# Patient Record
Sex: FEMALE | Race: WHITE | NOT HISPANIC OR LATINO | Employment: OTHER | URBAN - METROPOLITAN AREA
[De-identification: names, ages, dates, MRNs, and addresses within clinical notes are randomized per-mention and may not be internally consistent; named-entity substitution may affect disease eponyms.]

---

## 2020-08-18 ENCOUNTER — OFFICE VISIT (OUTPATIENT)
Dept: DERMATOLOGY | Facility: CLINIC | Age: 65
End: 2020-08-18
Payer: COMMERCIAL

## 2020-08-18 DIAGNOSIS — L82.1 SK (SEBORRHEIC KERATOSIS): ICD-10-CM

## 2020-08-18 DIAGNOSIS — L72.0 MILIA: ICD-10-CM

## 2020-08-18 DIAGNOSIS — L57.0 AK (ACTINIC KERATOSIS): ICD-10-CM

## 2020-08-18 DIAGNOSIS — Z12.83 SCREENING EXAM FOR SKIN CANCER: ICD-10-CM

## 2020-08-18 DIAGNOSIS — L81.4 LENTIGO: ICD-10-CM

## 2020-08-18 DIAGNOSIS — L71.9 ROSACEA: ICD-10-CM

## 2020-08-18 DIAGNOSIS — L72.0 EIC (EPIDERMAL INCLUSION CYST): ICD-10-CM

## 2020-08-18 DIAGNOSIS — D48.5 NEOPLASM OF UNCERTAIN BEHAVIOR OF SKIN: Primary | ICD-10-CM

## 2020-08-18 PROCEDURE — 99203 OFFICE O/P NEW LOW 30 MIN: CPT | Mod: 25,S$GLB,, | Performed by: DERMATOLOGY

## 2020-08-18 PROCEDURE — 99999 PR PBB SHADOW E&M-NEW PATIENT-LVL II: ICD-10-PCS | Mod: PBBFAC,,, | Performed by: DERMATOLOGY

## 2020-08-18 PROCEDURE — 99203 PR OFFICE/OUTPT VISIT, NEW, LEVL III, 30-44 MIN: ICD-10-PCS | Mod: 25,S$GLB,, | Performed by: DERMATOLOGY

## 2020-08-18 PROCEDURE — 11102 PR TANGENTIAL BIOPSY, SKIN, SINGLE LESION: ICD-10-PCS | Mod: S$GLB,,, | Performed by: DERMATOLOGY

## 2020-08-18 PROCEDURE — 88305 TISSUE EXAM BY PATHOLOGIST: ICD-10-PCS | Mod: 26,,, | Performed by: PATHOLOGY

## 2020-08-18 PROCEDURE — 88305 TISSUE EXAM BY PATHOLOGIST: CPT | Performed by: PATHOLOGY

## 2020-08-18 PROCEDURE — 88305 TISSUE EXAM BY PATHOLOGIST: CPT | Mod: 26,,, | Performed by: PATHOLOGY

## 2020-08-18 PROCEDURE — 17000 PR DESTRUCTION(LASER SURGERY,CRYOSURGERY,CHEMOSURGERY),PREMALIGNANT LESIONS,FIRST LESION: ICD-10-PCS | Mod: 59,S$GLB,, | Performed by: DERMATOLOGY

## 2020-08-18 PROCEDURE — 11102 TANGNTL BX SKIN SINGLE LES: CPT | Mod: S$GLB,,, | Performed by: DERMATOLOGY

## 2020-08-18 PROCEDURE — 99999 PR PBB SHADOW E&M-NEW PATIENT-LVL II: CPT | Mod: PBBFAC,,, | Performed by: DERMATOLOGY

## 2020-08-18 PROCEDURE — 17000 DESTRUCT PREMALG LESION: CPT | Mod: 59,S$GLB,, | Performed by: DERMATOLOGY

## 2020-08-18 NOTE — PATIENT INSTRUCTIONS
" Shave Biopsy Wound Care    Your doctor has performed a shave biopsy today.  A band aid and vaseline ointment has been placed over the site.  This should remain in place for 24 hours.  It is recommended that you keep the area dry for the first 24 hours.  After 24 hours, you may remove the band aid and wash the area with warm soap and water and apply Vaseline jelly.  Many patients prefer to use Neosporin or Bacitracin ointment.  This is acceptable; however, know that you can develop an allergy to this medication even if you have used it safely for years.  It is important to keep the area moist.  Letting it dry out and get air slows healing time, and will worsen the scar.  Band aid is optional after first 24 hours.      If you notice increasing redness, tenderness, pain, or yellow drainage at the biopsy site, please notify your doctor.  These are signs of an infection.    If your biopsy site is bleeding, apply firm pressure for 15 minutes straight.  Repeat for another 15 minutes, if it is still bleeding.   If the surgical site continues to bleed, then please contact your doctor.      For [facility] users:   You will receive a [facility] notification after the pathologist has finished reviewing your biopsy specimen. Pathology results, however, will not be released online so you will see a "no content" message. Once your dermatologist reviews and clinically correlates your biopsy results, you will either receive a letter in the mail with the results of a phone call from your doctor's office if further explanation or treatment is warranted.       [address] [place] [address]/ [phone] FAX/ [email or web address]      CRYOSURGERY      Your doctor has used a method called cryosurgery to treat your skin condition. Cryosurgery refers to the use of very cold substances to treat a variety of skin conditions such as warts, pre-skin cancers, molluscum contagiosum, sun spots, and several benign " growths. The substance we use in cryosurgery is liquid nitrogen and is so cold (-195 degrees Celsius) that is burns when administered.     Following treatment in the office, the skin may immediately burn and become red. You may find the area around the lesion is affected as well. It is sometimes necessary to treat not only the lesion, but a small area of the surrounding normal skin to achieve a good response.     A blister, and even a blood filled blister, may form after treatment.   This is a normal response. If the blister is painful, it is acceptable to sterilize a needle and with rubbing alcohol and gently pop the blister. It is important that you gently wash the area with soap and warm water as the blister fluid may contain wart virus if a wart was treated. Do no remove the roof of the blister.     The area treated can take anywhere from 1-3 weeks to heal. Healing time depends on the kind skin lesion treated, the location, and how aggressively the lesion was treated. It is recommended that the areas treated are covered with Vaseline or bacitracin ointment and a band-aid. If a band-aid is not practical, just ointment applied several times per day will do. Keeping these areas moist will speed the healing time.    Treatment with liquid nitrogen can leave a scar. In dark skin, it may be a light or dark scar, in light skin it may be a white or pink scar. These will generally fade with time, but may never go away completely.     If you have any concerns after your treatment, please feel free to call the office.       [facility] [place] [address]/ [phone] FAX/ [email or web address]

## 2020-08-18 NOTE — PROGRESS NOTES
Subjective:       Patient ID:  Ginger Marshall is a 65 y.o. female who presents for   Chief Complaint   Patient presents with    Skin Check     tbse     Here for TBSE    Patient with new complaint of lesion(s)  Location: right forearm  Duration: 3 years  Symptoms: none  Relieving factors/Previous treatments: none    No h/o nmsc or mm        Review of Systems   Skin: Positive for daily sunscreen use (most) and activity-related sunscreen use. Negative for recent sunburn.   Hematologic/Lymphatic: Does not bruise/bleed easily.        Objective:    Physical Exam   Constitutional: She appears well-developed and well-nourished. No distress.   Neurological: She is alert and oriented to person, place, and time. She is not disoriented.   Psychiatric: She has a normal mood and affect.   Skin:   Areas Examined (abnormalities noted in diagram):   Scalp / Hair Palpated and Inspected  Head / Face Inspection Performed  Neck Inspection Performed  Chest / Axilla Inspection Performed  Abdomen Inspection Performed  Genitals / Buttocks / Groin Inspection Performed  Back Inspection Performed  RUE Inspected  LUE Inspection Performed  RLE Inspected  LLE Inspection Performed  Nails and Digits Inspection Performed                   Diagram Legend     Erythematous scaling macule/papule c/w actinic keratosis       Vascular papule c/w angioma      Pigmented verrucoid papule/plaque c/w seborrheic keratosis      Yellow umbilicated papule c/w sebaceous hyperplasia      Irregularly shaped tan macule c/w lentigo     1-2 mm smooth white papules consistent with Milia      Movable subcutaneous cyst with punctum c/w epidermal inclusion cyst      Subcutaneous movable cyst c/w pilar cyst      Firm pink to brown papule c/w dermatofibroma      Pedunculated fleshy papule(s) c/w skin tag(s)      Evenly pigmented macule c/w junctional nevus     Mildly variegated pigmented, slightly irregular-bordered macule c/w mildly atypical nevus      Flesh colored to evenly  pigmented papule c/w intradermal nevus       Pink pearly papule/plaque c/w basal cell carcinoma      Erythematous hyperkeratotic cursted plaque c/w SCC      Surgical scar with no sign of skin cancer recurrence      Open and closed comedones      Inflammatory papules and pustules      Verrucoid papule consistent consistent with wart     Erythematous eczematous patches and plaques     Dystrophic onycholytic nail with subungual debris c/w onychomycosis     Umbilicated papule    Erythematous-base heme-crusted tan verrucoid plaque consistent with inflamed seborrheic keratosis     Erythematous Silvery Scaling Plaque c/w Psoriasis     See annotation          Assessment / Plan:      Pathology Orders:     Normal Orders This Visit    Specimen to Pathology, Dermatology     Questions:    Procedure Type: Dermatology and skin neoplasms    Number of Specimens: 1    ------------------------: -------------------------    Spec 1 Procedure: Biopsy    Spec 1 Clinical Impression: r/o sclerotic bcc vs scar    Spec 1 Source: mid forehead        Neoplasm of uncertain behavior of skin  -     Specimen to Pathology, Dermatology  Shave biopsy procedure note:    Shave biopsy performed after verbal consent including risk of infection, scar, recurrence, need for additional treatment of site. Area prepped with alcohol, anesthetized with approximately 1.0cc of 1% lidocaine with epinephrine. Lesional tissue shaved with razor blade. Hemostasis achieved with application of aluminum chloride followed by hyfrecation. No complications. Dressing applied. Wound care explained.    If biopsy positive for malignancy, refer to Dr. Millard for Mohs surgery consultation.      AK (actinic keratosis)  Cryosurgery Procedure Note    Verbal consent from the patient is obtained including, but not limited to, risk of hypopigmentation/hyperpigmentation, scar, recurrence of lesion. The patient is aware of the precancerous quality and need for treatment of these lesions.  Liquid nitrogen cryosurgery is applied to the 1 actinic keratoses, as detailed in the physical exam, to produce a freeze injury. The patient is aware that blisters may form and is instructed on wound care with gentle cleansing and use of vaseline ointment to keep moist until healed. The patient is supplied a handout on cryosurgery and is instructed to call if lesions do not completely resolve.      SK (seborrheic keratosis)  These are benign inherited growths without a malignant potential. Reassurance given to patient. No treatment is necessary.       Lentigo  This is a benign hyperpigmented sun induced lesion. Daily sun protection will reduce the number of new lesions. Treatment of these benign lesions are considered cosmetic.      Rosacea  sent Rx for triple cream [facility] - use on face qday      Screening exam for skin cancer  Total body skin examination performed today including at least 12 points as noted in physical examination. Suspicious lesions noted.      Milia - upper lip  Reassurance given to patient. No treatment is necessary.   Treatment of benign, asymptomatic lesions may be considered cosmetic.      EIC (epidermal inclusion cyst)  Reassurance given to patient. No treatment is necessary.   Treatment of benign, asymptomatic lesions may be considered cosmetic.               Follow up in about 6 months (around 2/18/2021).

## 2020-08-20 ENCOUNTER — TELEPHONE (OUTPATIENT)
Dept: DERMATOLOGY | Facility: CLINIC | Age: 65
End: 2020-08-20

## 2020-08-20 LAB
FINAL PATHOLOGIC DIAGNOSIS: NORMAL
GROSS: NORMAL
MICROSCOPIC EXAM: NORMAL

## 2020-08-20 NOTE — TELEPHONE ENCOUNTER
LVM for pt in ref to co-pay issues and refunds/Insurence has been activated/given Billing contact# to call.

## 2020-08-20 NOTE — TELEPHONE ENCOUNTER
----- Message from Anahi Garcia sent at 8/20/2020 12:14 PM CDT -----  Regarding: Insurance verification  Contact: Ginger  Type:  Needs Medical Advice      Who Called: Ginger  Would the patient rather a call back or a response via [facility]? Callback  Best Call Back Number: [phone]  Additional Information:  Says on Tuesday she came in and insurance was not able to be verified and made a payment of $150.70 and wanted to know about gettting a refund since her insurance is updated in the system not

## 2020-08-26 ENCOUNTER — OFFICE VISIT (OUTPATIENT)
Dept: OBSTETRICS AND GYNECOLOGY | Facility: CLINIC | Age: 65
End: 2020-08-26
Payer: COMMERCIAL

## 2020-08-26 VITALS
WEIGHT: 200.19 LBS | SYSTOLIC BLOOD PRESSURE: 122 MMHG | HEIGHT: 66 IN | BODY MASS INDEX: 32.17 KG/M2 | DIASTOLIC BLOOD PRESSURE: 80 MMHG

## 2020-08-26 DIAGNOSIS — Z12.39 BREAST CANCER SCREENING: ICD-10-CM

## 2020-08-26 DIAGNOSIS — K64.9 HEMORRHOIDS, UNSPECIFIED HEMORRHOID TYPE: ICD-10-CM

## 2020-08-26 DIAGNOSIS — Z01.419 ENCOUNTER FOR GYNECOLOGICAL EXAMINATION WITHOUT ABNORMAL FINDING: Primary | ICD-10-CM

## 2020-08-26 DIAGNOSIS — Z78.0 POSTMENOPAUSAL: ICD-10-CM

## 2020-08-26 PROCEDURE — 99999 PR PBB SHADOW E&M-EST. PATIENT-LVL III: CPT | Mod: PBBFAC,,, | Performed by: OBSTETRICS & GYNECOLOGY

## 2020-08-26 PROCEDURE — 88175 CYTOPATH C/V AUTO FLUID REDO: CPT

## 2020-08-26 PROCEDURE — 99386 PREV VISIT NEW AGE 40-64: CPT | Mod: S$GLB,,, | Performed by: OBSTETRICS & GYNECOLOGY

## 2020-08-26 PROCEDURE — 99999 PR PBB SHADOW E&M-EST. PATIENT-LVL III: ICD-10-PCS | Mod: PBBFAC,,, | Performed by: OBSTETRICS & GYNECOLOGY

## 2020-08-26 PROCEDURE — 3008F PR BODY MASS INDEX (BMI) DOCUMENTED: ICD-10-PCS | Mod: CPTII,S$GLB,, | Performed by: OBSTETRICS & GYNECOLOGY

## 2020-08-26 PROCEDURE — 99386 PR PREVENTIVE VISIT,NEW,40-64: ICD-10-PCS | Mod: S$GLB,,, | Performed by: OBSTETRICS & GYNECOLOGY

## 2020-08-26 PROCEDURE — 3008F BODY MASS INDEX DOCD: CPT | Mod: CPTII,S$GLB,, | Performed by: OBSTETRICS & GYNECOLOGY

## 2020-08-26 RX ORDER — LEVOTHYROXINE SODIUM 112 UG/1
TABLET ORAL
COMMUNITY
Start: 2020-07-07 | End: 2020-09-03 | Stop reason: SDUPTHER

## 2020-08-26 RX ORDER — ATORVASTATIN CALCIUM 40 MG/1
TABLET, FILM COATED ORAL
COMMUNITY
Start: 2020-07-07 | End: 2020-09-03 | Stop reason: SDUPTHER

## 2020-08-26 RX ORDER — HYDROCORTISONE ACETATE PRAMOXINE HCL 2.5; 1 G/100G; G/100G
CREAM TOPICAL 3 TIMES DAILY
Qty: 30 G | Refills: 3 | Status: SHIPPED | OUTPATIENT
Start: 2020-08-26 | End: 2021-01-06

## 2020-08-26 NOTE — PROGRESS NOTES
"CC: Well woman exam    Ginger Marshall is a 65 y.o. female  presents for a well woman exam.  LMP: No LMP recorded (lmp unknown). Patient is postmenopausal..  No issues, problems, or complaints.  LMP 20 years ago    History reviewed. No pertinent past medical history.  History reviewed. No pertinent surgical history.  Social History     Socioeconomic History    Marital status:      Spouse name: Not on file    Number of children: Not on file    Years of education: Not on file    Highest education level: Not on file   Occupational History    Not on file   Social Needs    Financial resource strain: Not on file    Food insecurity     Worry: Not on file     Inability: Not on file    Transportation needs     Medical: Not on file     Non-medical: Not on file   Tobacco Use    Smoking status: Never Smoker    Smokeless tobacco: Never Used   Substance and Sexual Activity    Alcohol use: Yes     Comment: Social    Drug use: Never    Sexual activity: Yes     Partners: Male   Lifestyle    Physical activity     Days per week: Not on file     Minutes per session: Not on file    Stress: Not on file   Relationships    Social connections     Talks on phone: Not on file     Gets together: Not on file     Attends Muslim service: Not on file     Active member of club or organization: Not on file     Attends meetings of clubs or organizations: Not on file     Relationship status: Not on file   Other Topics Concern    Not on file   Social History Narrative    Not on file     Family History   Problem Relation Age of Onset    Stroke Father     Brain cancer Mother     Breast cancer Other     Breast cancer Other      OB History        1    Para   1    Term   1            AB        Living   1       SAB        TAB        Ectopic        Multiple        Live Births   1                 /80   Ht 5' 6" (1.676 m)   Wt 90.8 kg (200 lb 2.8 oz)   LMP  (LMP Unknown)   BMI 32.31 kg/m² "       ROS:    ROS:  GENERAL: Denies weight gain or weight loss. Feeling well overall.   SKIN: Denies rash or lesions.   HEAD: Denies head injury or headache.   NODES: Denies enlarged lymph nodes.   CHEST: Denies chest pain or shortness of breath.   CARDIOVASCULAR: Denies palpitations or left sided chest pain.   ABDOMEN: No abdominal pain, constipation, diarrhea, nausea, vomiting or rectal bleeding.   URINARY: No frequency, dysuria, hematuria, or burning on urination.  REPRODUCTIVE: See HPI.   BREASTS: The patient performs breast self-examination and denies pain, lumps, or nipple discharge.   HEMATOLOGIC: No easy bruisability or excessive bleeding.   MUSCULOSKELETAL: Denies joint pain or swelling.   NEUROLOGIC: Denies syncope or weakness.   PSYCHIATRIC: Denies depression, anxiety or mood swings.    PHYSICAL EXAM:    APPEARANCE: Well nourished, well developed, in no acute distress.  AFFECT: WNL, alert and oriented x 3  SKIN: No acne or hirsutism  NECK: Neck symmetric without masses or thyromegaly  NODES: No inguinal, cervical, axillary, or femoral lymph node enlargement  CHEST: Good respiratory effect  ABDOMEN: Soft.  No tenderness or masses.  No hepatosplenomegaly.  No hernias.  BREASTS: Symmetrical, no skin changes or visible lesions.  No palpable masses, nipple discharge bilaterally.  PELVIC: Normal external genitalia without lesions.  Normal hair distribution.  Adequate perineal body, normal urethral meatus.  Vagina moist and well rugated without lesions or discharge.  Cervix pink, without lesions, discharge or tenderness.  No significant cystocele or rectocele.  Bimanual exam shows uterus to be normal size, regular, mobile and nontender.  Adnexa without masses or tenderness.    RECTAL: Rectovaginal exam confirms above with normal sphincter tone, hemorrhoids present   EXTREMITIES: No edema.      ICD-10-CM ICD-9-CM    1. Encounter for gynecological examination without abnormal finding  Z01.419 V72.31 Liquid-Based  Pap Smear, Screening   2. Postmenopausal  Z78.0 V49.81    3. Breast cancer screening  Z12.39 V76.10 Mammo Digital Screening Bilat w/ Pa   4. Hemorrhoids, unspecified hemorrhoid type  K64.9 455.6 hydrocortisone-pramoxine (ANALPRAM-HC) 2.5-1 % Crea       Taking CA and Vit D  Will see PCP for colonoscopy    Patient was counseled today on A.C.S. Pap guidelines and recommendations for yearly pelvic exams, mammograms and monthly self breast exams; to see her PCP for other health maintenance.     Follow up in about 1 year (around 8/26/2021).

## 2020-08-27 ENCOUNTER — HOSPITAL ENCOUNTER (OUTPATIENT)
Dept: RADIOLOGY | Facility: HOSPITAL | Age: 65
Discharge: HOME | End: 2020-08-27
Attending: OBSTETRICS & GYNECOLOGY
Payer: COMMERCIAL

## 2020-08-27 DIAGNOSIS — Z12.39 BREAST CANCER SCREENING: ICD-10-CM

## 2020-08-27 PROCEDURE — 77067 SCR MAMMO BI INCL CAD: CPT | Mod: 26,,, | Performed by: RADIOLOGY

## 2020-08-27 PROCEDURE — 77067 MAMMO DIGITAL SCREENING BILAT WITH TOMOSYNTHESIS_CAD: ICD-10-PCS | Mod: 26,,, | Performed by: RADIOLOGY

## 2020-08-27 PROCEDURE — 77063 MAMMO DIGITAL SCREENING BILAT WITH TOMOSYNTHESIS_CAD: ICD-10-PCS | Mod: 26,,, | Performed by: RADIOLOGY

## 2020-08-27 PROCEDURE — 77063 BREAST TOMOSYNTHESIS BI: CPT | Mod: 26,,, | Performed by: RADIOLOGY

## 2020-08-27 PROCEDURE — 77067 SCR MAMMO BI INCL CAD: CPT | Mod: TC

## 2020-08-28 ENCOUNTER — TELEPHONE (OUTPATIENT)
Dept: DERMATOLOGY | Facility: CLINIC | Age: 65
End: 2020-08-28

## 2020-09-01 ENCOUNTER — PROCEDURE VISIT (OUTPATIENT)
Dept: DERMATOLOGY | Facility: CLINIC | Age: 65
End: 2020-09-01
Payer: COMMERCIAL

## 2020-09-01 VITALS
WEIGHT: 200 LBS | HEART RATE: 60 BPM | DIASTOLIC BLOOD PRESSURE: 86 MMHG | SYSTOLIC BLOOD PRESSURE: 157 MMHG | BODY MASS INDEX: 32.14 KG/M2 | HEIGHT: 66 IN

## 2020-09-01 DIAGNOSIS — C44.319 BASAL CELL CARCINOMA OF FOREHEAD: Primary | ICD-10-CM

## 2020-09-01 PROCEDURE — 99499 NO LOS: ICD-10-PCS | Mod: S$GLB,,, | Performed by: DERMATOLOGY

## 2020-09-01 PROCEDURE — 99499 UNLISTED E&M SERVICE: CPT | Mod: S$GLB,,, | Performed by: DERMATOLOGY

## 2020-09-01 PROCEDURE — 13131 CMPLX RPR F/C/C/M/N/AX/G/H/F: CPT | Mod: S$GLB,,, | Performed by: DERMATOLOGY

## 2020-09-01 PROCEDURE — 17311: ICD-10-PCS | Mod: 51,S$GLB,, | Performed by: DERMATOLOGY

## 2020-09-01 PROCEDURE — 17311 MOHS 1 STAGE H/N/HF/G: CPT | Mod: 51,S$GLB,, | Performed by: DERMATOLOGY

## 2020-09-01 PROCEDURE — 13131 PR RECMPL WND HEAD,FAC,HAND 1.1-2.5 CM: ICD-10-PCS | Mod: S$GLB,,, | Performed by: DERMATOLOGY

## 2020-09-01 RX ORDER — HYDROCODONE BITARTRATE AND ACETAMINOPHEN 5; 325 MG/1; MG/1
1 TABLET ORAL EVERY 6 HOURS PRN
Qty: 10 TABLET | Refills: 0 | Status: SHIPPED | OUTPATIENT
Start: 2020-09-01 | End: 2020-10-02

## 2020-09-01 NOTE — PROGRESS NOTES
PROCEDURE: Mohs' Micrographic Surgery    INDICATION: Location in non-mask areas of face where maximum conservation of tumor-free tissue is needed. Biopsy-proven skin cancer of cosmetically and functionally important areas, including head, neck, genital, hand, foot, or areas known for having difficulty in healing, such as the lower anterior legs. Tumor with ill-defined borders. Aggressive histopathology including sclerosing, morpheaform/infiltrating, micronodular, superficial multicentric, poorly differentiated, basosquamous, or perineural invasion.    REFERRING MD: Tea Green M.D.    CASE NUMBER:     ANESTHETIC: 2 cc 0.5% Lidocaine with Epi 1:200,000 mixed 1:1 with 0.5% Bupivacaine    SURGICAL PREP: Hibiclens    SURGEON: Cole Millard MD    ASSISTANTS: Erica Muhammad PA-C and Carly Multani Surg Nitesh    PREOPERATIVE DIAGNOSIS: basal cell carcinoma    POSTOPERATIVE DIAGNOSIS: basal cell carcinoma    PATHOLOGIC DIAGNOSIS: basal cell carcinoma- nodular, infiltrating; well differentiated squamous cell carcinoma    HISTOLOGY OF SPECIMENS IN FIRST STAGE:   Tumor Type: No tumor seen.    STAGES OF MOHS' SURGERY PERFORMED: 1    TUMOR-FREE PLANE ACHIEVED: Yes    HEMOSTASIS: electrocoagulation     SPECIMENS: 2    LOCATION: mid forehead. Patient verified location with hand held mirror. Also verified with photo in Epic.    INITIAL LESION SIZE: 0.4 x 0.5 cm    FINAL DEFECT SIZE: 0.7 x 0.9 cm    WOUND REPAIR/DISPOSITION: The patient tolerated Mohs' Micrographic Surgery for a basal cell carcinoma very well. When the tumor was completely removed, a repair of the surgical defect was undertaken.       PROCEDURE: Complex Linear Repair    INDICATION: Status post Mohs' Micrographic Surgery for basal cell carcinoma.    CASE NUMBER:     SURGEON: Cole Millard MD    ASSISTANTS: Carly Multani Surg Nitesh    ANESTHETIC: 2 cc 1% Lidocaine with Epinephrine 1:100,000    SURGICAL PREP: Hibiclens, prepped by Carly Multani, Surg  "Tech    LOCATION: mid forehead    DEFECT SIZE: 0.7 x 0.9 cm    WOUND REPAIR/DISPOSITION:  After the patient's carcinoma had been completely removed with Mohs' Micrographic Surgery, a repair of the surgical defect was undertaken. The patient was returned to the operating suite where the area of mid forehead was prepped, draped, and anesthetized in the usual sterile fashion. The wound was widely undermined in all directions. Then, electrocoagulation was used to obtain meticulous hemostasis. 5-0 Vicryl buried vertical mattress sutures were placed into the subcutaneous and dermal plane to close the wound and malinda the cutaneous wound edge. Bilateral dog ears were identified and were removed by a standard Burow's triangle technique. The cutaneous wound edges were closed using running 6-0 Prolene suture.    The patient tolerated the procedure well.    The area was cleaned and dressed appropriately and the patient was given wound care instructions, as well as appointment for follow-up evaluation and suture removal in 7 days. Patient was placed on Norco 5-325 mg prn postop pain.    LENGTH OF REPAIR: 1.8 cm    Vitals:    09/01/20 1206 09/01/20 1409   BP: (!) 143/82 (!) 157/86   BP Location:  Left arm   Patient Position:  Sitting   BP Method:  Large (Automatic)   Pulse: 69 60   Weight: 90.7 kg (200 lb)    Height: 5' 6" (1.676 m)        "

## 2020-09-02 ENCOUNTER — OFFICE VISIT (OUTPATIENT)
Dept: INTERNAL MEDICINE | Facility: CLINIC | Age: 65
End: 2020-09-02
Payer: COMMERCIAL

## 2020-09-02 ENCOUNTER — LAB VISIT (OUTPATIENT)
Dept: LAB | Facility: HOSPITAL | Age: 65
End: 2020-09-02
Attending: INTERNAL MEDICINE
Payer: COMMERCIAL

## 2020-09-02 ENCOUNTER — TELEPHONE (OUTPATIENT)
Dept: INTERNAL MEDICINE | Facility: CLINIC | Age: 65
End: 2020-09-02

## 2020-09-02 VITALS
BODY MASS INDEX: 31.49 KG/M2 | SYSTOLIC BLOOD PRESSURE: 130 MMHG | OXYGEN SATURATION: 97 % | DIASTOLIC BLOOD PRESSURE: 94 MMHG | HEIGHT: 67 IN | TEMPERATURE: 98 F | HEART RATE: 82 BPM | RESPIRATION RATE: 18 BRPM | WEIGHT: 200.63 LBS

## 2020-09-02 DIAGNOSIS — M85.80 OSTEOPENIA, UNSPECIFIED LOCATION: ICD-10-CM

## 2020-09-02 DIAGNOSIS — Z00.00 ANNUAL PHYSICAL EXAM: Primary | ICD-10-CM

## 2020-09-02 DIAGNOSIS — Z12.11 COLON CANCER SCREENING: ICD-10-CM

## 2020-09-02 DIAGNOSIS — E78.5 HYPERLIPIDEMIA, UNSPECIFIED HYPERLIPIDEMIA TYPE: ICD-10-CM

## 2020-09-02 DIAGNOSIS — R03.0 ELEVATED BLOOD PRESSURE READING: ICD-10-CM

## 2020-09-02 DIAGNOSIS — E03.9 HYPOTHYROIDISM, UNSPECIFIED TYPE: ICD-10-CM

## 2020-09-02 DIAGNOSIS — Z00.00 ANNUAL PHYSICAL EXAM: ICD-10-CM

## 2020-09-02 DIAGNOSIS — R07.9 CHEST PAIN, UNSPECIFIED TYPE: ICD-10-CM

## 2020-09-02 LAB
ALBUMIN SERPL BCP-MCNC: 4.5 G/DL (ref 3.5–5.2)
ALP SERPL-CCNC: 58 U/L (ref 55–135)
ALT SERPL W/O P-5'-P-CCNC: 22 U/L (ref 10–44)
ANION GAP SERPL CALC-SCNC: 9 MMOL/L (ref 8–16)
AST SERPL-CCNC: 22 U/L (ref 10–40)
BASOPHILS # BLD AUTO: 0.04 K/UL (ref 0–0.2)
BASOPHILS NFR BLD: 0.6 % (ref 0–1.9)
BILIRUB SERPL-MCNC: 0.5 MG/DL (ref 0.1–1)
BUN SERPL-MCNC: 16 MG/DL (ref 8–23)
CALCIUM SERPL-MCNC: 9.4 MG/DL (ref 8.7–10.5)
CHLORIDE SERPL-SCNC: 105 MMOL/L (ref 95–110)
CHOLEST SERPL-MCNC: 219 MG/DL (ref 120–199)
CHOLEST/HDLC SERPL: 3 {RATIO} (ref 2–5)
CO2 SERPL-SCNC: 27 MMOL/L (ref 23–29)
CREAT SERPL-MCNC: 0.8 MG/DL (ref 0.5–1.4)
DIFFERENTIAL METHOD: ABNORMAL
EOSINOPHIL # BLD AUTO: 0 K/UL (ref 0–0.5)
EOSINOPHIL NFR BLD: 0.6 % (ref 0–8)
ERYTHROCYTE [DISTWIDTH] IN BLOOD BY AUTOMATED COUNT: 13.4 % (ref 11.5–14.5)
EST. GFR  (AFRICAN AMERICAN): >60 ML/MIN/1.73 M^2
EST. GFR  (NON AFRICAN AMERICAN): >60 ML/MIN/1.73 M^2
GLUCOSE SERPL-MCNC: 90 MG/DL (ref 70–110)
HCT VFR BLD AUTO: 43.7 % (ref 37–48.5)
HDLC SERPL-MCNC: 73 MG/DL (ref 40–75)
HDLC SERPL: 33.3 % (ref 20–50)
HGB BLD-MCNC: 13.4 G/DL (ref 12–16)
IMM GRANULOCYTES # BLD AUTO: 0.01 K/UL (ref 0–0.04)
IMM GRANULOCYTES NFR BLD AUTO: 0.2 % (ref 0–0.5)
LDLC SERPL CALC-MCNC: 123.4 MG/DL (ref 63–159)
LYMPHOCYTES # BLD AUTO: 2.5 K/UL (ref 1–4.8)
LYMPHOCYTES NFR BLD: 40.3 % (ref 18–48)
MCH RBC QN AUTO: 30 PG (ref 27–31)
MCHC RBC AUTO-ENTMCNC: 30.7 G/DL (ref 32–36)
MCV RBC AUTO: 98 FL (ref 82–98)
MONOCYTES # BLD AUTO: 0.6 K/UL (ref 0.3–1)
MONOCYTES NFR BLD: 9.6 % (ref 4–15)
NEUTROPHILS # BLD AUTO: 3 K/UL (ref 1.8–7.7)
NEUTROPHILS NFR BLD: 48.7 % (ref 38–73)
NONHDLC SERPL-MCNC: 146 MG/DL
NRBC BLD-RTO: 0 /100 WBC
PLATELET # BLD AUTO: 235 K/UL (ref 150–350)
PMV BLD AUTO: 11.6 FL (ref 9.2–12.9)
POTASSIUM SERPL-SCNC: 4.1 MMOL/L (ref 3.5–5.1)
PROT SERPL-MCNC: 7.5 G/DL (ref 6–8.4)
RBC # BLD AUTO: 4.47 M/UL (ref 4–5.4)
SODIUM SERPL-SCNC: 141 MMOL/L (ref 136–145)
TRIGL SERPL-MCNC: 113 MG/DL (ref 30–150)
TSH SERPL DL<=0.005 MIU/L-ACNC: 1.77 UIU/ML (ref 0.4–4)
WBC # BLD AUTO: 6.25 K/UL (ref 3.9–12.7)

## 2020-09-02 PROCEDURE — 83036 HEMOGLOBIN GLYCOSYLATED A1C: CPT

## 2020-09-02 PROCEDURE — 99386 PR PREVENTIVE VISIT,NEW,40-64: ICD-10-PCS | Mod: S$GLB,,, | Performed by: INTERNAL MEDICINE

## 2020-09-02 PROCEDURE — 99999 PR PBB SHADOW E&M-EST. PATIENT-LVL IV: CPT | Mod: PBBFAC,,, | Performed by: INTERNAL MEDICINE

## 2020-09-02 PROCEDURE — 84443 ASSAY THYROID STIM HORMONE: CPT

## 2020-09-02 PROCEDURE — 93010 ELECTROCARDIOGRAM REPORT: CPT | Mod: S$GLB,,, | Performed by: INTERNAL MEDICINE

## 2020-09-02 PROCEDURE — 3008F BODY MASS INDEX DOCD: CPT | Mod: CPTII,S$GLB,, | Performed by: INTERNAL MEDICINE

## 2020-09-02 PROCEDURE — 85025 COMPLETE CBC W/AUTO DIFF WBC: CPT

## 2020-09-02 PROCEDURE — 93005 EKG 12-LEAD: ICD-10-PCS | Mod: S$GLB,,, | Performed by: INTERNAL MEDICINE

## 2020-09-02 PROCEDURE — 93005 ELECTROCARDIOGRAM TRACING: CPT | Mod: S$GLB,,, | Performed by: INTERNAL MEDICINE

## 2020-09-02 PROCEDURE — 99999 PR PBB SHADOW E&M-EST. PATIENT-LVL IV: ICD-10-PCS | Mod: PBBFAC,,, | Performed by: INTERNAL MEDICINE

## 2020-09-02 PROCEDURE — 82306 VITAMIN D 25 HYDROXY: CPT

## 2020-09-02 PROCEDURE — 36415 COLL VENOUS BLD VENIPUNCTURE: CPT | Mod: PO

## 2020-09-02 PROCEDURE — 80053 COMPREHEN METABOLIC PANEL: CPT

## 2020-09-02 PROCEDURE — 93010 EKG 12-LEAD: ICD-10-PCS | Mod: S$GLB,,, | Performed by: INTERNAL MEDICINE

## 2020-09-02 PROCEDURE — 3008F PR BODY MASS INDEX (BMI) DOCUMENTED: ICD-10-PCS | Mod: CPTII,S$GLB,, | Performed by: INTERNAL MEDICINE

## 2020-09-02 PROCEDURE — 80061 LIPID PANEL: CPT

## 2020-09-02 PROCEDURE — 99386 PREV VISIT NEW AGE 40-64: CPT | Mod: S$GLB,,, | Performed by: INTERNAL MEDICINE

## 2020-09-02 NOTE — PROGRESS NOTES
Subjective:       Patient ID: Ginger Marshall is a 65 y.o. female.    Chief Complaint: Establish Care (pt dx with osteopenia), Annual Exam (sisters both had TIA's family hx high chol. low thyroid ), Chest Pain (flutter feeling, chest tightness), and Medication Refill    HPI    65 y.o. female here for annual exam.     Health Maintenance/ Screening:   Labs: due  Breast Cancer: Mammogram    Cervical Cancer:  Pap pending  Colorectal Cancer: Colonoscopy - prefers to do iFOBT         Vaccines:   Influenza:    Tetanus: thinks utd    Shingrix: utd         Past Medical History:   Diagnosis Date    Basal cell carcinoma     Osteopenia      Past Surgical History:   Procedure Laterality Date     SECTION      LASIK      TONSILLECTOMY       Family History   Problem Relation Age of Onset    Stroke Father     Brain cancer Mother     Breast cancer Other     Breast cancer Other      Social History     Socioeconomic History    Marital status:      Spouse name: Not on file    Number of children: Not on file    Years of education: Not on file    Highest education level: Not on file   Occupational History    Not on file   Social Needs    Financial resource strain: Not hard at all    Food insecurity     Worry: Never true     Inability: Never true    Transportation needs     Medical: No     Non-medical: No   Tobacco Use    Smoking status: Never Smoker    Smokeless tobacco: Never Used   Substance and Sexual Activity    Alcohol use: Yes     Frequency: 2-4 times a month     Drinks per session: 1 or 2     Binge frequency: Never     Comment: Social    Drug use: Never    Sexual activity: Yes     Partners: Male   Lifestyle    Physical activity     Days per week: 1 day     Minutes per session: 30 min    Stress: Only a little   Relationships    Social connections     Talks on phone: More than three times a week     Gets together: Once a week     Attends Jewish service: Not on file     Active member of club  or organization: Yes     Attends meetings of clubs or organizations: 1 to 4 times per year     Relationship status:    Other Topics Concern    Are you pregnant or think you may be? Not Asked    Breast-feeding Not Asked   Social History Narrative    Not on file     Review of patient's allergies indicates:  No Known Allergies    Current Outpatient Medications:     atorvastatin (LIPITOR) 40 MG tablet, TK 1 T PO QD, Disp: , Rfl:     HYDROcodone-acetaminophen (NORCO) 5-325 mg per tablet, Take 1 tablet by mouth every 6 (six) hours as needed for Pain., Disp: 10 tablet, Rfl: 0    hydrocortisone-pramoxine (ANALPRAM-HC) 2.5-1 % Crea, Place rectally 3 (three) times daily., Disp: 30 g, Rfl: 3    levothyroxine (SYNTHROID) 112 MCG tablet, TK 1 T PO QD, Disp: , Rfl:       Review of Systems   Constitutional: Negative for activity change and unexpected weight change.   HENT: Negative for hearing loss, rhinorrhea and trouble swallowing.    Eyes: Negative for discharge and visual disturbance.   Respiratory: Negative for chest tightness and wheezing.    Cardiovascular: Positive for chest pain (sporadically for the past 3 years, not provoked by exertion/exercise). Negative for palpitations.   Gastrointestinal: Positive for anal bleeding (d/t hemorrhoids) and blood in stool (when hemorrhoids act up). Negative for abdominal pain, constipation, diarrhea and vomiting.        - melena   Endocrine: Negative for polydipsia and polyuria.   Genitourinary: Negative for difficulty urinating, dysuria, hematuria and menstrual problem.   Musculoskeletal: Negative for arthralgias, joint swelling and neck pain.   Neurological: Negative for weakness and headaches.   Psychiatric/Behavioral: Negative for confusion and dysphoric mood.       Objective:        Vitals:    09/02/20 1417 09/02/20 1433   BP: (!) 146/104 (!) 130/94   BP Location: Right arm    Patient Position: Sitting    BP Method: Large (Manual)    Pulse: 82    Resp: 18    Temp:  "98.1 °F (36.7 °C)    TempSrc: Temporal    SpO2: 97%    Weight: 91 kg (200 lb 9.9 oz)    Height: 5' 7" (1.702 m)        Body mass index is 31.42 kg/m².    Physical Exam  Constitutional:       General: She is not in acute distress.     Appearance: She is well-developed. She is not diaphoretic.   HENT:      Head: Normocephalic and atraumatic.      Right Ear: Tympanic membrane normal.      Left Ear: Tympanic membrane normal.      Nose: Nose normal.   Eyes:      Conjunctiva/sclera: Conjunctivae normal.   Neck:      Musculoskeletal: Neck supple.      Thyroid: No thyromegaly.      Trachea: No tracheal deviation.   Cardiovascular:      Rate and Rhythm: Normal rate and regular rhythm.      Heart sounds: Murmur present.   Pulmonary:      Effort: Pulmonary effort is normal.      Breath sounds: Normal breath sounds.   Abdominal:      General: Bowel sounds are normal. There is no distension.      Palpations: Abdomen is soft.      Tenderness: There is no abdominal tenderness.   Musculoskeletal:         General: No swelling.   Lymphadenopathy:      Cervical: No cervical adenopathy.   Skin:     General: Skin is warm and dry.      Nails: There is no clubbing.     Neurological:      Mental Status: She is alert and oriented to person, place, and time.      Sensory: No sensory deficit.   Psychiatric:         Mood and Affect: Affect is tearful.         Behavior: Behavior normal.         Judgment: Judgment normal.         Assessment:     1. Annual physical exam    2. Colon cancer screening    3. Hypothyroidism, unspecified type    4. Hyperlipidemia, unspecified hyperlipidemia type    5. Osteopenia, unspecified location    6. Chest pain, unspecified type    7. Elevated blood pressure reading           Plan:         1. Annual physical exam  - Records requested from PCP and bone density results from previous gyn. shingrix completed at pharmacy- she will get pharmacy to fax vaccine record to update her chart here.   - mammo utd. Pap in " process.   - CBC auto differential; Future  - Comprehensive metabolic panel; Future  - Hemoglobin A1C; Future  - Lipid Panel; Future  - TSH; Future  - Vitamin D; Future    2. Colon cancer screening  - she is hesitant to do colonoscopy given pandemic and we would want stress test prior to any procedure given her symptoms. She would like to proceed w/ fit kit and understands if positive then will need colonoscopy. Hemorrhoids noted on gyn exam and bleeding only occurs when those are symptomatic.   - Fecal Immunochemical Test (iFOBT); Future    3. Hypothyroidism, unspecified type  - refill levothyroxine pending lab results- currently 112mcg  - TSH; Future    4. Hyperlipidemia, unspecified hyperlipidemia type  - refill atorvastatin pending lab results- currently 40mg  - Lipid Panel; Future    5. Osteopenia, unspecified location  - bone density results requested  - Vitamin D; Future    6. Chest pain, unspecified type  - discussed ER precautions. Reassuring that symptoms do not occur with exertion and that have been present for 3 years without adverse events.   - EKG 12-lead; Future  - Stress Echo Which stress agent will be used? Treadmill Exercise; Color Flow Doppler? No; Future    7. Elevated blood pressure reading  - she reports BP usually in normal to low range. She is visibly upset regarding her health/symptoms and the health of her family so likely contributing to elevated BP. We will have her monitor home BP 1-2x daily for the next month. She will then return to clinic with home BP machine and BP log to determine management. Notify clinic sooner if home BP persistently elevated though.        Patient note was created using MModal Dictation.  Any errors in syntax or even information may not have been identified and edited on initial review prior to signing this note.

## 2020-09-02 NOTE — PATIENT INSTRUCTIONS
Return to clinic in one month to follow up blood pressure- bring home BP machine and log of BP readings- check 1-2 times daily.

## 2020-09-03 ENCOUNTER — LAB VISIT (OUTPATIENT)
Dept: LAB | Facility: HOSPITAL | Age: 65
End: 2020-09-03
Attending: INTERNAL MEDICINE
Payer: COMMERCIAL

## 2020-09-03 ENCOUNTER — PATIENT MESSAGE (OUTPATIENT)
Dept: INTERNAL MEDICINE | Facility: CLINIC | Age: 65
End: 2020-09-03

## 2020-09-03 DIAGNOSIS — E03.9 HYPOTHYROIDISM, UNSPECIFIED TYPE: ICD-10-CM

## 2020-09-03 DIAGNOSIS — E78.5 HYPERLIPIDEMIA, UNSPECIFIED HYPERLIPIDEMIA TYPE: Primary | ICD-10-CM

## 2020-09-03 DIAGNOSIS — R73.03 PREDIABETES: ICD-10-CM

## 2020-09-03 DIAGNOSIS — Z12.11 COLON CANCER SCREENING: ICD-10-CM

## 2020-09-03 LAB
25(OH)D3+25(OH)D2 SERPL-MCNC: 40 NG/ML (ref 30–96)
ESTIMATED AVG GLUCOSE: 117 MG/DL (ref 68–131)
HBA1C MFR BLD HPLC: 5.7 % (ref 4–5.6)

## 2020-09-03 PROCEDURE — 82274 ASSAY TEST FOR BLOOD FECAL: CPT

## 2020-09-03 RX ORDER — LEVOTHYROXINE SODIUM 112 UG/1
TABLET ORAL
Qty: 90 TABLET | Refills: 1 | Status: SHIPPED | OUTPATIENT
Start: 2020-09-03 | End: 2021-01-06 | Stop reason: SDUPTHER

## 2020-09-03 RX ORDER — ATORVASTATIN CALCIUM 40 MG/1
TABLET, FILM COATED ORAL
Qty: 90 TABLET | Refills: 1 | Status: SHIPPED | OUTPATIENT
Start: 2020-09-03 | End: 2021-01-06 | Stop reason: SDUPTHER

## 2020-09-08 ENCOUNTER — OFFICE VISIT (OUTPATIENT)
Dept: DERMATOLOGY | Facility: CLINIC | Age: 65
End: 2020-09-08
Payer: COMMERCIAL

## 2020-09-08 ENCOUNTER — HOSPITAL ENCOUNTER (OUTPATIENT)
Dept: CARDIOLOGY | Facility: HOSPITAL | Age: 65
Discharge: HOME | End: 2020-09-08
Attending: INTERNAL MEDICINE
Payer: COMMERCIAL

## 2020-09-08 VITALS — BODY MASS INDEX: 32.14 KG/M2 | WEIGHT: 200 LBS | HEIGHT: 66 IN

## 2020-09-08 DIAGNOSIS — R07.9 CHEST PAIN, UNSPECIFIED TYPE: ICD-10-CM

## 2020-09-08 DIAGNOSIS — Z09 POSTOP CHECK: Primary | ICD-10-CM

## 2020-09-08 LAB
ASCENDING AORTA: 3.04 CM
BSA FOR ECHO PROCEDURE: 2.06 M2
CV ECHO LV RWT: 0.42 CM
CV STRESS BASE HR: 74 BPM
DIASTOLIC BLOOD PRESSURE: 87 MMHG
DOP CALC LVOT AREA: 2.7 CM2
DOP CALC LVOT DIAMETER: 1.85 CM
DOP CALC LVOT PEAK VEL: 1.55 M/S
DOP CALC LVOT STROKE VOLUME: 93.98 CM3
DOP CALCLVOT PEAK VEL VTI: 34.98 CM
E WAVE DECELERATION TIME: 294.59 MSEC
E/A RATIO: 0.83
E/E' RATIO: 13.85 M/S
ECHO LV POSTERIOR WALL: 0.87 CM (ref 0.6–1.1)
FRACTIONAL SHORTENING: 30 % (ref 28–44)
INTERVENTRICULAR SEPTUM: 0.98 CM (ref 0.6–1.1)
IVRT: 94.2 MSEC
LA MAJOR: 4.49 CM
LA MINOR: 5.24 CM
LA WIDTH: 2.11 CM
LEFT ATRIUM SIZE: 3.37 CM
LEFT ATRIUM VOLUME INDEX: 14.6 ML/M2
LEFT ATRIUM VOLUME: 29.23 CM3
LEFT INTERNAL DIMENSION IN SYSTOLE: 2.9 CM (ref 2.1–4)
LEFT VENTRICLE DIASTOLIC VOLUME INDEX: 37.51 ML/M2
LEFT VENTRICLE DIASTOLIC VOLUME: 75.03 ML
LEFT VENTRICLE MASS INDEX: 60 G/M2
LEFT VENTRICLE SYSTOLIC VOLUME INDEX: 16 ML/M2
LEFT VENTRICLE SYSTOLIC VOLUME: 32.09 ML
LEFT VENTRICULAR INTERNAL DIMENSION IN DIASTOLE: 4.12 CM (ref 3.5–6)
LEFT VENTRICULAR MASS: 119.44 G
LV LATERAL E/E' RATIO: 12.86 M/S
LV SEPTAL E/E' RATIO: 15 M/S
MV PEAK A VEL: 1.08 M/S
MV PEAK E VEL: 0.9 M/S
MV STENOSIS PRESSURE HALF TIME: 85.43 MS
MV VALVE AREA P 1/2 METHOD: 2.58 CM2
OHS CV CPX 1 MINUTE RECOVERY HEART RATE: 123 BPM
OHS CV CPX 85 PERCENT MAX PREDICTED HEART RATE MALE: 128
OHS CV CPX ESTIMATED METS: 12
OHS CV CPX MAX PREDICTED HEART RATE: 151
OHS CV CPX PATIENT IS FEMALE: 1
OHS CV CPX PATIENT IS MALE: 0
OHS CV CPX PEAK DIASTOLIC BLOOD PRESSURE: 71 MMHG
OHS CV CPX PEAK HEAR RATE: 141 BPM
OHS CV CPX PEAK RATE PRESSURE PRODUCT: NORMAL
OHS CV CPX PEAK SYSTOLIC BLOOD PRESSURE: 193 MMHG
OHS CV CPX PERCENT MAX PREDICTED HEART RATE ACHIEVED: 94
OHS CV CPX RATE PRESSURE PRODUCT PRESENTING: NORMAL
PISA TR MAX VEL: 2.61 M/S
PULM VEIN S/D RATIO: 1.66
PV PEAK D VEL: 0.47 M/S
PV PEAK S VEL: 0.78 M/S
RA MAJOR: 3.79 CM
RA PRESSURE: 3 MMHG
RA WIDTH: 1.79 CM
RIGHT VENTRICULAR END-DIASTOLIC DIMENSION: 3.4 CM
RV TISSUE DOPPLER FREE WALL SYSTOLIC VELOCITY 1 (APICAL 4 CHAMBER VIEW): 13.22 CM/S
SINUS: 3.33 CM
STJ: 2.77 CM
STRESS ECHO POST EXERCISE DUR MIN: 7 MINUTES
STRESS ECHO POST EXERCISE DUR SEC: 26 SECONDS
SYSTOLIC BLOOD PRESSURE: 153 MMHG
TDI LATERAL: 0.07 M/S
TDI SEPTAL: 0.06 M/S
TDI: 0.07 M/S
TR MAX PG: 27 MMHG
TRICUSPID ANNULAR PLANE SYSTOLIC EXCURSION: 2.16 CM
TV REST PULMONARY ARTERY PRESSURE: 30 MMHG

## 2020-09-08 PROCEDURE — 93351 STRESS ECHO (CUPID ONLY): ICD-10-PCS | Mod: 26,,, | Performed by: INTERNAL MEDICINE

## 2020-09-08 PROCEDURE — 99024 POSTOP FOLLOW-UP VISIT: CPT | Mod: S$GLB,,, | Performed by: DERMATOLOGY

## 2020-09-08 PROCEDURE — 93351 STRESS TTE COMPLETE: CPT | Mod: 26,,, | Performed by: INTERNAL MEDICINE

## 2020-09-08 PROCEDURE — 99999 PR PBB SHADOW E&M-EST. PATIENT-LVL II: CPT | Mod: PBBFAC,,, | Performed by: DERMATOLOGY

## 2020-09-08 PROCEDURE — 99024 PR POST-OP FOLLOW-UP VISIT: ICD-10-PCS | Mod: S$GLB,,, | Performed by: DERMATOLOGY

## 2020-09-08 PROCEDURE — 93351 STRESS TTE COMPLETE: CPT

## 2020-09-08 PROCEDURE — 99999 PR PBB SHADOW E&M-EST. PATIENT-LVL II: ICD-10-PCS | Mod: PBBFAC,,, | Performed by: DERMATOLOGY

## 2020-09-08 NOTE — PROGRESS NOTES
65 y.o. female patient is here for suture removal following Mohs' surgery.    Patient reports no problems mid forehead.    WOUND PE:  The mid forehead sutures intact. Wound healing well. Good skin edges. No signs or symptoms of infection.    IMPRESSION:  Healing operative site from Mohs' surgery BCC, mid forehead s/p Mohs with CLC, postop day # 7.    PLAN:  Sutures removed today. Steri-strips applied.  Continue wound care.  Keep moist with Aquaphor.  Call if any concern arises.    RTC:  In 3-6 months with Tea Green M.D. for skin check or sooner if new concern arises.

## 2020-09-09 LAB — HEMOCCULT STL QL IA: NEGATIVE

## 2020-09-14 LAB
FINAL PATHOLOGIC DIAGNOSIS: NORMAL
Lab: NORMAL

## 2020-10-02 ENCOUNTER — OFFICE VISIT (OUTPATIENT)
Dept: INTERNAL MEDICINE | Facility: CLINIC | Age: 65
End: 2020-10-02
Payer: COMMERCIAL

## 2020-10-02 VITALS
TEMPERATURE: 97 F | HEIGHT: 66 IN | DIASTOLIC BLOOD PRESSURE: 86 MMHG | BODY MASS INDEX: 31.6 KG/M2 | SYSTOLIC BLOOD PRESSURE: 138 MMHG | OXYGEN SATURATION: 94 % | RESPIRATION RATE: 16 BRPM | WEIGHT: 196.63 LBS | HEART RATE: 78 BPM

## 2020-10-02 DIAGNOSIS — K64.9 HEMORRHOIDS, UNSPECIFIED HEMORRHOID TYPE: ICD-10-CM

## 2020-10-02 DIAGNOSIS — I10 ESSENTIAL HYPERTENSION: Primary | ICD-10-CM

## 2020-10-02 DIAGNOSIS — Z23 NEED FOR INFLUENZA VACCINATION: ICD-10-CM

## 2020-10-02 DIAGNOSIS — Z91.89 RISK FACTORS FOR OBSTRUCTIVE SLEEP APNEA: ICD-10-CM

## 2020-10-02 PROCEDURE — 99999 PR PBB SHADOW E&M-EST. PATIENT-LVL IV: ICD-10-PCS | Mod: PBBFAC,,, | Performed by: INTERNAL MEDICINE

## 2020-10-02 PROCEDURE — 3008F PR BODY MASS INDEX (BMI) DOCUMENTED: ICD-10-PCS | Mod: CPTII,S$GLB,, | Performed by: INTERNAL MEDICINE

## 2020-10-02 PROCEDURE — 99214 OFFICE O/P EST MOD 30 MIN: CPT | Mod: 25,S$GLB,, | Performed by: INTERNAL MEDICINE

## 2020-10-02 PROCEDURE — 90686 FLU VACCINE (QUAD) GREATER THAN OR EQUAL TO 3YO PRESERVATIVE FREE IM: ICD-10-PCS | Mod: S$GLB,,, | Performed by: INTERNAL MEDICINE

## 2020-10-02 PROCEDURE — 90471 FLU VACCINE (QUAD) GREATER THAN OR EQUAL TO 3YO PRESERVATIVE FREE IM: ICD-10-PCS | Mod: S$GLB,,, | Performed by: INTERNAL MEDICINE

## 2020-10-02 PROCEDURE — 3008F BODY MASS INDEX DOCD: CPT | Mod: CPTII,S$GLB,, | Performed by: INTERNAL MEDICINE

## 2020-10-02 PROCEDURE — 99999 PR PBB SHADOW E&M-EST. PATIENT-LVL IV: CPT | Mod: PBBFAC,,, | Performed by: INTERNAL MEDICINE

## 2020-10-02 PROCEDURE — 90471 IMMUNIZATION ADMIN: CPT | Mod: S$GLB,,, | Performed by: INTERNAL MEDICINE

## 2020-10-02 PROCEDURE — 90686 IIV4 VACC NO PRSV 0.5 ML IM: CPT | Mod: S$GLB,,, | Performed by: INTERNAL MEDICINE

## 2020-10-02 PROCEDURE — 99214 PR OFFICE/OUTPT VISIT, EST, LEVL IV, 30-39 MIN: ICD-10-PCS | Mod: 25,S$GLB,, | Performed by: INTERNAL MEDICINE

## 2020-10-02 RX ORDER — AMLODIPINE BESYLATE 5 MG/1
5 TABLET ORAL DAILY
Qty: 30 TABLET | Refills: 5 | Status: SHIPPED | OUTPATIENT
Start: 2020-10-02 | End: 2021-01-06 | Stop reason: SDUPTHER

## 2020-10-02 NOTE — PROGRESS NOTES
"Subjective:       Patient ID: Ginger Marshall is a 65 y.o. female.    Chief Complaint: Follow-up (1 month (BP)), Rectal Pain (Stinging ), Immunizations (Flu & Pnemonia vacc), and Sleep apena (Wants a test done )    HPI    65 y.o. female presents for follow up of chronic medical problems    HTN - home BPs ranging 130s-160/70s-80s (few 90s)    Hemorrhoids: no improvement with analpram cream. Takes metamucil daily otherwise has constipation. Denies straining or prolonged sitting on toilet.    She request sleep study. Her  has told her that she stops breathing in her sleep. She sleeps 10-11 hours at night but reports that it is restless. She denies fatigue or morning headaches.     Review of Systems   Constitutional: Negative for chills and fever.   Eyes: Negative for visual disturbance.   Respiratory: Positive for apnea (witnessed episodes during sleep). Negative for shortness of breath.    Cardiovascular: Negative for chest pain.   Gastrointestinal: Positive for anal bleeding and rectal pain. Negative for blood in stool, constipation and diarrhea.   Genitourinary: Negative for difficulty urinating.   Neurological: Negative for headaches.   Psychiatric/Behavioral: Positive for sleep disturbance (restless).       Objective:        Vitals:    10/02/20 1426 10/02/20 1506   BP: (!) 140/99 138/86   BP Location: Left arm    Patient Position: Sitting    BP Method: Large (Manual)    Pulse: 78    Resp: 16    Temp: 97.3 °F (36.3 °C)    TempSrc: Temporal    SpO2: (!) 94%    Weight: 89.2 kg (196 lb 10.4 oz)    Height: 5' 6" (1.676 m)        Body mass index is 31.74 kg/m².    Physical Exam  Constitutional:       General: She is not in acute distress.     Appearance: She is well-developed. She is not diaphoretic.   HENT:      Head: Normocephalic and atraumatic.   Eyes:      General:         Right eye: No discharge.         Left eye: No discharge.      Conjunctiva/sclera: Conjunctivae normal.   Neck:      Musculoskeletal: Neck " supple.      Comments: Circumference = 16 inches  Cardiovascular:      Rate and Rhythm: Normal rate and regular rhythm.      Heart sounds: Normal heart sounds.   Pulmonary:      Effort: Pulmonary effort is normal.      Breath sounds: Normal breath sounds.   Abdominal:      General: Bowel sounds are normal.      Palpations: Abdomen is soft.   Musculoskeletal:      Right lower leg: No edema.      Left lower leg: No edema.   Skin:     General: Skin is warm and dry.      Findings: No erythema.   Neurological:      General: No focal deficit present.      Mental Status: She is alert.   Psychiatric:         Behavior: Behavior normal.         Thought Content: Thought content normal.         Assessment:     1. Essential hypertension    2. Hemorrhoids, unspecified hemorrhoid type    3. Risk factors for obstructive sleep apnea    4. Need for influenza vaccination           Plan:         1. Essential hypertension  - continue to monitor home BP, notify clinic if remains elevated  - amLODIPine (NORVASC) 5 MG tablet; Take 1 tablet (5 mg total) by mouth once daily.  Dispense: 30 tablet; Refill: 5    2. Hemorrhoids, unspecified hemorrhoid type  - continue analpram   - Ambulatory referral/consult to Colorectal Surgery; Future    3. Risk factors for obstructive sleep apnea  - Home Sleep Studies; Future    4. Need for influenza vaccination  - flu vaccine today  - no indication for pneumonia vaccine, she understands and reports she was just curious since a friend received it.      rtc 6 mo f/u HTN, HLD, pre-dm, hypothyroidism     Patient note was created using MModal Dictation.  Any errors in syntax or even information may not have been identified and edited on initial review prior to signing this note.

## 2020-10-06 ENCOUNTER — TELEPHONE (OUTPATIENT)
Dept: SURGERY | Facility: CLINIC | Age: 65
End: 2020-10-06

## 2020-10-06 ENCOUNTER — OFFICE VISIT (OUTPATIENT)
Dept: SURGERY | Facility: CLINIC | Age: 65
End: 2020-10-06
Payer: COMMERCIAL

## 2020-10-06 VITALS
BODY MASS INDEX: 31.39 KG/M2 | HEART RATE: 76 BPM | HEIGHT: 66 IN | SYSTOLIC BLOOD PRESSURE: 160 MMHG | WEIGHT: 195.31 LBS | DIASTOLIC BLOOD PRESSURE: 100 MMHG

## 2020-10-06 DIAGNOSIS — K60.2 ANAL FISSURE: ICD-10-CM

## 2020-10-06 PROCEDURE — 99203 OFFICE O/P NEW LOW 30 MIN: CPT | Mod: S$GLB,,, | Performed by: NURSE PRACTITIONER

## 2020-10-06 PROCEDURE — 99999 PR PBB SHADOW E&M-EST. PATIENT-LVL IV: ICD-10-PCS | Mod: PBBFAC,,, | Performed by: NURSE PRACTITIONER

## 2020-10-06 PROCEDURE — 3008F BODY MASS INDEX DOCD: CPT | Mod: CPTII,S$GLB,, | Performed by: NURSE PRACTITIONER

## 2020-10-06 PROCEDURE — 99999 PR PBB SHADOW E&M-EST. PATIENT-LVL IV: CPT | Mod: PBBFAC,,, | Performed by: NURSE PRACTITIONER

## 2020-10-06 PROCEDURE — 99203 PR OFFICE/OUTPT VISIT, NEW, LEVL III, 30-44 MIN: ICD-10-PCS | Mod: S$GLB,,, | Performed by: NURSE PRACTITIONER

## 2020-10-06 PROCEDURE — 3008F PR BODY MASS INDEX (BMI) DOCUMENTED: ICD-10-PCS | Mod: CPTII,S$GLB,, | Performed by: NURSE PRACTITIONER

## 2020-10-06 NOTE — PROGRESS NOTES
Patient ID:  Ginger Marshall is a 65 y.o. female     Chief Complaint:   Chief Complaint   Patient presents with    Hemorrhoids        HPI:  Ginger Marshall presents to colon and rectal surgery with a complaint of rectal pain for the past 2 weeks. Pain primarily during and after bowel movements, throbbing sensation. Used proctofoam with no improvement in symptoms. No blood in stool. Taking metamucil daily. Daily soft BM.     Negative FIT 2020  No family hx of CRC  No prior anorectal surgeries     ROS:     Review of Systems   Constitutional: Negative for fatigue, fever and unexpected weight change.   Respiratory: Negative for shortness of breath.    Cardiovascular: Negative for chest pain.   Gastrointestinal: Positive for rectal pain. Negative for abdominal distention, abdominal pain, anal bleeding, blood in stool, constipation, diarrhea, nausea and vomiting.       Review of patient's allergies indicates:  No Known Allergies  Past Medical History:   Diagnosis Date    Basal cell carcinoma     Osteopenia      Past Surgical History:   Procedure Laterality Date     SECTION      LASIK      TONSILLECTOMY       Family History   Problem Relation Age of Onset    Stroke Father     Brain cancer Mother     Breast cancer Other     Breast cancer Other      Current Outpatient Medications on File Prior to Visit   Medication Sig    amLODIPine (NORVASC) 5 MG tablet Take 1 tablet (5 mg total) by mouth once daily.    atorvastatin (LIPITOR) 40 MG tablet TK 1 T PO QD    hydrocortisone-pramoxine (ANALPRAM-HC) 2.5-1 % Crea Place rectally 3 (three) times daily.    levothyroxine (SYNTHROID) 112 MCG tablet TK 1 T PO QD    multivitamin capsule Take 1 capsule by mouth once daily.     No current facility-administered medications on file prior to visit.        PE:  Vitals:    10/06/20 1417   BP: (!) 160/100   Pulse: 76     Physical Exam   Constitutional: She is oriented to person, place, and time and well-developed, well-nourished,  and in no distress. No distress.   HENT:   Head: Normocephalic and atraumatic.   Pulmonary/Chest: Effort normal. No respiratory distress.   Abdominal: Soft. She exhibits no distension. There is no abdominal tenderness.   Genitourinary:    Genitourinary Comments: Numerous perianal skin tags  Posterior midline anal fissure with hypertonic tone      Musculoskeletal: Normal range of motion.   Neurological: She is alert and oriented to person, place, and time. GCS score is 15.   Skin: Skin is warm and dry. No rash noted. No erythema.   Psychiatric: Affect normal.       Impression:  Encounter Diagnosis   Name Primary?    Anal fissure        PLAN:  Ginger was seen today for hemorrhoids.    Diagnoses and all orders for this visit:    Anal fissure  -     Ambulatory referral/consult to Colorectal Surgery    Other orders  -     diltiazem HCl (DILTIAZEM 2% CREAM); Apply topically 3 (three) times daily. Apply topically to anal area.    Increased fiber intake (20-25 grams/day) and fluid intake (8-10 glasses water/day)  Daily fiber supplement  Soaks/sitz baths  Avoid excessive trauma/straining if possible  Topical diltiazem 2% tid.  RTO 6 weeks

## 2020-10-06 NOTE — LETTER
October 6, 2020      Rachel Camacho MD  [address]  [place] [address]           [facility] [address]  [address]  [facility] [address]  Phone: [phone]          Patient: Ginger Marshall   MR Number: [id number]   YOB: 1955   Date of Visit: 10/6/2020       Dear Dr. Rachel Camacho:    Thank you for referring Ginger Marshall to me for evaluation. Attached you will find relevant portions of my assessment and plan of care.    If you have questions, please do not hesitate to call me. I look forward to following Ginger Marshall along with you.    Sincerely,    Laurel Rosenberg, SINAN    Enclosure  CC:  No Recipients    If you would like to receive this communication electronically, please contact [email or web address] or [phone] to request more information on [employer] Link access.    For providers and/or their staff who would like to refer a patient to [facility], please contact us through our one-stop-shop provider referral line, Roselyn Multani, at [phone].    If you feel you have received this communication in error or would no longer like to receive these types of communications, please e-mail [email or web address]

## 2020-10-07 ENCOUNTER — TELEPHONE (OUTPATIENT)
Dept: SLEEP MEDICINE | Facility: OTHER | Age: 65
End: 2020-10-07

## 2020-11-03 ENCOUNTER — TELEPHONE (OUTPATIENT)
Dept: SLEEP MEDICINE | Facility: OTHER | Age: 65
End: 2020-11-03

## 2020-11-05 ENCOUNTER — HOSPITAL ENCOUNTER (OUTPATIENT)
Dept: SLEEP MEDICINE | Facility: OTHER | Age: 65
Discharge: HOME | End: 2020-11-05
Attending: INTERNAL MEDICINE
Payer: COMMERCIAL

## 2020-11-05 DIAGNOSIS — Z91.89 RISK FACTORS FOR OBSTRUCTIVE SLEEP APNEA: ICD-10-CM

## 2020-11-05 DIAGNOSIS — G47.33 OSA (OBSTRUCTIVE SLEEP APNEA): Primary | ICD-10-CM

## 2020-11-05 PROCEDURE — 95800 SLP STDY UNATTENDED: CPT | Mod: 26,,, | Performed by: INTERNAL MEDICINE

## 2020-11-05 PROCEDURE — 95800 PR SLEEP STUDY, UNATTENDED, RECORD HEART RATE/O2 SAT/RESP ANAL/SLEEP TIME: ICD-10-PCS | Mod: 26,,, | Performed by: INTERNAL MEDICINE

## 2020-11-05 PROCEDURE — 95800 SLP STDY UNATTENDED: CPT

## 2020-11-10 ENCOUNTER — PATIENT MESSAGE (OUTPATIENT)
Dept: INTERNAL MEDICINE | Facility: CLINIC | Age: 65
End: 2020-11-10

## 2020-11-10 DIAGNOSIS — G47.33 OSA (OBSTRUCTIVE SLEEP APNEA): Primary | ICD-10-CM

## 2020-11-10 NOTE — PROCEDURES
"The sleep study that you ordered is complete.  You have ordered sleep LAB services to perform the sleep study for Ginger Marshall.     Please find Sleep Study result in  the "Media tab" of Chart Review menu.         You can look  for the report in the  Media as a procedure document type.    As the ordering provider, you are responsible for reviewing the results and implementing a treatment plan with your patient.     If you need a Sleep Medicine provider to explain the sleep study findings and arrange treatment for the patient, please refer patient for consultation to our Sleep Clinic via [facility] with Ambulatory Consult Sleep.     To do that please place an order for an  "Ambulatory Consult Sleep" - it will go to our clinic work queue for our Medical Assistant to contact the patient for an appointment.     For any questions, please contact our clinic staff at [phone] to talk to clinical staff.     "

## 2021-01-06 ENCOUNTER — OFFICE VISIT (OUTPATIENT)
Dept: INTERNAL MEDICINE | Facility: CLINIC | Age: 66
End: 2021-01-06
Payer: COMMERCIAL

## 2021-01-06 VITALS
HEART RATE: 76 BPM | HEIGHT: 67 IN | OXYGEN SATURATION: 96 % | TEMPERATURE: 98 F | WEIGHT: 199.75 LBS | SYSTOLIC BLOOD PRESSURE: 122 MMHG | RESPIRATION RATE: 18 BRPM | BODY MASS INDEX: 31.35 KG/M2 | DIASTOLIC BLOOD PRESSURE: 84 MMHG

## 2021-01-06 DIAGNOSIS — Z11.59 NEED FOR HEPATITIS C SCREENING TEST: ICD-10-CM

## 2021-01-06 DIAGNOSIS — Z00.00 HEALTHCARE MAINTENANCE: ICD-10-CM

## 2021-01-06 DIAGNOSIS — Z11.4 SCREENING FOR HIV (HUMAN IMMUNODEFICIENCY VIRUS): ICD-10-CM

## 2021-01-06 DIAGNOSIS — E03.9 HYPOTHYROIDISM, UNSPECIFIED TYPE: ICD-10-CM

## 2021-01-06 DIAGNOSIS — I10 ESSENTIAL HYPERTENSION: Primary | ICD-10-CM

## 2021-01-06 DIAGNOSIS — R73.03 PREDIABETES: ICD-10-CM

## 2021-01-06 DIAGNOSIS — E78.5 HYPERLIPIDEMIA, UNSPECIFIED HYPERLIPIDEMIA TYPE: ICD-10-CM

## 2021-01-06 DIAGNOSIS — G47.33 OSA (OBSTRUCTIVE SLEEP APNEA): ICD-10-CM

## 2021-01-06 PROCEDURE — 1126F AMNT PAIN NOTED NONE PRSNT: CPT | Mod: S$GLB,,, | Performed by: INTERNAL MEDICINE

## 2021-01-06 PROCEDURE — 3079F DIAST BP 80-89 MM HG: CPT | Mod: CPTII,S$GLB,, | Performed by: INTERNAL MEDICINE

## 2021-01-06 PROCEDURE — 99999 PR PBB SHADOW E&M-EST. PATIENT-LVL III: ICD-10-PCS | Mod: PBBFAC,,, | Performed by: INTERNAL MEDICINE

## 2021-01-06 PROCEDURE — 99214 OFFICE O/P EST MOD 30 MIN: CPT | Mod: S$GLB,,, | Performed by: INTERNAL MEDICINE

## 2021-01-06 PROCEDURE — 1126F PR PAIN SEVERITY QUANTIFIED, NO PAIN PRESENT: ICD-10-PCS | Mod: S$GLB,,, | Performed by: INTERNAL MEDICINE

## 2021-01-06 PROCEDURE — 3079F PR MOST RECENT DIASTOLIC BLOOD PRESSURE 80-89 MM HG: ICD-10-PCS | Mod: CPTII,S$GLB,, | Performed by: INTERNAL MEDICINE

## 2021-01-06 PROCEDURE — 99999 PR PBB SHADOW E&M-EST. PATIENT-LVL III: CPT | Mod: PBBFAC,,, | Performed by: INTERNAL MEDICINE

## 2021-01-06 PROCEDURE — 99214 PR OFFICE/OUTPT VISIT, EST, LEVL IV, 30-39 MIN: ICD-10-PCS | Mod: S$GLB,,, | Performed by: INTERNAL MEDICINE

## 2021-01-06 PROCEDURE — 3008F PR BODY MASS INDEX (BMI) DOCUMENTED: ICD-10-PCS | Mod: CPTII,S$GLB,, | Performed by: INTERNAL MEDICINE

## 2021-01-06 PROCEDURE — 3074F SYST BP LT 130 MM HG: CPT | Mod: CPTII,S$GLB,, | Performed by: INTERNAL MEDICINE

## 2021-01-06 PROCEDURE — 3074F PR MOST RECENT SYSTOLIC BLOOD PRESSURE < 130 MM HG: ICD-10-PCS | Mod: CPTII,S$GLB,, | Performed by: INTERNAL MEDICINE

## 2021-01-06 PROCEDURE — 3008F BODY MASS INDEX DOCD: CPT | Mod: CPTII,S$GLB,, | Performed by: INTERNAL MEDICINE

## 2021-01-06 RX ORDER — ATORVASTATIN CALCIUM 40 MG/1
TABLET, FILM COATED ORAL
Qty: 90 TABLET | Refills: 1 | Status: SHIPPED | OUTPATIENT
Start: 2021-01-06 | End: 2021-09-27

## 2021-01-06 RX ORDER — AMLODIPINE BESYLATE 5 MG/1
5 TABLET ORAL DAILY
Qty: 90 TABLET | Refills: 1 | Status: SHIPPED | OUTPATIENT
Start: 2021-01-06 | End: 2021-05-23

## 2021-01-06 RX ORDER — LEVOTHYROXINE SODIUM 112 UG/1
TABLET ORAL
Qty: 90 TABLET | Refills: 1 | Status: SHIPPED | OUTPATIENT
Start: 2021-01-06 | End: 2021-09-27

## 2021-01-07 ENCOUNTER — LAB VISIT (OUTPATIENT)
Dept: LAB | Facility: HOSPITAL | Age: 66
End: 2021-01-07
Attending: INTERNAL MEDICINE
Payer: COMMERCIAL

## 2021-01-07 DIAGNOSIS — R73.03 PREDIABETES: ICD-10-CM

## 2021-01-07 DIAGNOSIS — E03.9 HYPOTHYROIDISM, UNSPECIFIED TYPE: ICD-10-CM

## 2021-01-07 DIAGNOSIS — I10 ESSENTIAL HYPERTENSION: ICD-10-CM

## 2021-01-07 DIAGNOSIS — E78.5 HYPERLIPIDEMIA, UNSPECIFIED HYPERLIPIDEMIA TYPE: ICD-10-CM

## 2021-01-07 DIAGNOSIS — Z00.00 HEALTHCARE MAINTENANCE: ICD-10-CM

## 2021-01-07 DIAGNOSIS — Z11.4 SCREENING FOR HIV (HUMAN IMMUNODEFICIENCY VIRUS): ICD-10-CM

## 2021-01-07 DIAGNOSIS — Z11.59 NEED FOR HEPATITIS C SCREENING TEST: ICD-10-CM

## 2021-01-07 LAB
ALBUMIN SERPL BCP-MCNC: 4.1 G/DL (ref 3.5–5.2)
ALP SERPL-CCNC: 64 U/L (ref 55–135)
ALT SERPL W/O P-5'-P-CCNC: 30 U/L (ref 10–44)
ANION GAP SERPL CALC-SCNC: 11 MMOL/L (ref 8–16)
AST SERPL-CCNC: 27 U/L (ref 10–40)
BILIRUB SERPL-MCNC: 0.6 MG/DL (ref 0.1–1)
BUN SERPL-MCNC: 16 MG/DL (ref 8–23)
CALCIUM SERPL-MCNC: 9.3 MG/DL (ref 8.7–10.5)
CHLORIDE SERPL-SCNC: 105 MMOL/L (ref 95–110)
CHOLEST SERPL-MCNC: 200 MG/DL (ref 120–199)
CHOLEST/HDLC SERPL: 3 {RATIO} (ref 2–5)
CO2 SERPL-SCNC: 25 MMOL/L (ref 23–29)
CREAT SERPL-MCNC: 0.8 MG/DL (ref 0.5–1.4)
EST. GFR  (AFRICAN AMERICAN): >60 ML/MIN/1.73 M^2
EST. GFR  (NON AFRICAN AMERICAN): >60 ML/MIN/1.73 M^2
ESTIMATED AVG GLUCOSE: 117 MG/DL (ref 68–131)
GLUCOSE SERPL-MCNC: 108 MG/DL (ref 70–110)
HBA1C MFR BLD HPLC: 5.7 % (ref 4–5.6)
HDLC SERPL-MCNC: 67 MG/DL (ref 40–75)
HDLC SERPL: 33.5 % (ref 20–50)
LDLC SERPL CALC-MCNC: 109.6 MG/DL (ref 63–159)
NONHDLC SERPL-MCNC: 133 MG/DL
POTASSIUM SERPL-SCNC: 3.9 MMOL/L (ref 3.5–5.1)
PROT SERPL-MCNC: 7.2 G/DL (ref 6–8.4)
SODIUM SERPL-SCNC: 141 MMOL/L (ref 136–145)
TRIGL SERPL-MCNC: 117 MG/DL (ref 30–150)
TSH SERPL DL<=0.005 MIU/L-ACNC: 1.39 UIU/ML (ref 0.4–4)

## 2021-01-07 PROCEDURE — 86803 HEPATITIS C AB TEST: CPT

## 2021-01-07 PROCEDURE — 80053 COMPREHEN METABOLIC PANEL: CPT

## 2021-01-07 PROCEDURE — 83036 HEMOGLOBIN GLYCOSYLATED A1C: CPT

## 2021-01-07 PROCEDURE — 80061 LIPID PANEL: CPT

## 2021-01-07 PROCEDURE — 86703 HIV-1/HIV-2 1 RESULT ANTBDY: CPT

## 2021-01-07 PROCEDURE — 36415 COLL VENOUS BLD VENIPUNCTURE: CPT | Mod: PO

## 2021-01-07 PROCEDURE — 84443 ASSAY THYROID STIM HORMONE: CPT

## 2021-01-08 ENCOUNTER — TELEPHONE (OUTPATIENT)
Dept: INTERNAL MEDICINE | Facility: CLINIC | Age: 66
End: 2021-01-08

## 2021-01-08 LAB
HCV AB SERPL QL IA: NEGATIVE
HIV 1+2 AB+HIV1 P24 AG SERPL QL IA: NEGATIVE

## 2021-03-05 ENCOUNTER — TELEPHONE (OUTPATIENT)
Dept: INTERNAL MEDICINE | Facility: CLINIC | Age: 66
End: 2021-03-05

## 2021-03-23 ENCOUNTER — TELEPHONE (OUTPATIENT)
Dept: INTERNAL MEDICINE | Facility: CLINIC | Age: 66
End: 2021-03-23

## 2021-04-16 ENCOUNTER — PATIENT MESSAGE (OUTPATIENT)
Dept: RESEARCH | Facility: HOSPITAL | Age: 66
End: 2021-04-16

## 2021-05-23 DIAGNOSIS — I10 ESSENTIAL HYPERTENSION: ICD-10-CM

## 2021-05-23 RX ORDER — AMLODIPINE BESYLATE 5 MG/1
TABLET ORAL
Qty: 30 TABLET | Refills: 2 | Status: SHIPPED | OUTPATIENT
Start: 2021-05-23 | End: 2021-08-27

## 2021-11-17 ENCOUNTER — PATIENT OUTREACH (OUTPATIENT)
Dept: ADMINISTRATIVE | Facility: HOSPITAL | Age: 66
End: 2021-11-17
Payer: COMMERCIAL

## 2021-11-17 ENCOUNTER — PATIENT MESSAGE (OUTPATIENT)
Dept: ADMINISTRATIVE | Facility: HOSPITAL | Age: 66
End: 2021-11-17
Payer: COMMERCIAL

## 2021-11-17 DIAGNOSIS — Z12.11 COLON CANCER SCREENING: Primary | ICD-10-CM

## 2021-11-19 ENCOUNTER — TELEPHONE (OUTPATIENT)
Dept: INTERNAL MEDICINE | Facility: CLINIC | Age: 66
End: 2021-11-19
Payer: COMMERCIAL

## 2021-11-19 DIAGNOSIS — Z12.11 COLON CANCER SCREENING: Primary | ICD-10-CM

## 2021-11-23 ENCOUNTER — PATIENT MESSAGE (OUTPATIENT)
Dept: INTERNAL MEDICINE | Facility: CLINIC | Age: 66
End: 2021-11-23
Payer: COMMERCIAL

## 2021-12-13 ENCOUNTER — TELEPHONE (OUTPATIENT)
Dept: INTERNAL MEDICINE | Facility: CLINIC | Age: 66
End: 2021-12-13
Payer: COMMERCIAL

## 2021-12-14 ENCOUNTER — OFFICE VISIT (OUTPATIENT)
Dept: INTERNAL MEDICINE | Facility: CLINIC | Age: 66
End: 2021-12-14
Payer: COMMERCIAL

## 2021-12-14 ENCOUNTER — LAB VISIT (OUTPATIENT)
Dept: LAB | Facility: HOSPITAL | Age: 66
End: 2021-12-14
Attending: INTERNAL MEDICINE
Payer: COMMERCIAL

## 2021-12-14 VITALS
BODY MASS INDEX: 31.08 KG/M2 | RESPIRATION RATE: 14 BRPM | SYSTOLIC BLOOD PRESSURE: 120 MMHG | HEART RATE: 68 BPM | TEMPERATURE: 98 F | WEIGHT: 198 LBS | HEIGHT: 67 IN | DIASTOLIC BLOOD PRESSURE: 62 MMHG

## 2021-12-14 DIAGNOSIS — N30.00 ACUTE CYSTITIS WITHOUT HEMATURIA: ICD-10-CM

## 2021-12-14 DIAGNOSIS — Z12.31 VISIT FOR SCREENING MAMMOGRAM: ICD-10-CM

## 2021-12-14 DIAGNOSIS — N30.00 ACUTE CYSTITIS WITHOUT HEMATURIA: Primary | ICD-10-CM

## 2021-12-14 LAB
AMORPH CRY UR QL COMP ASSIST: ABNORMAL
BACTERIA #/AREA URNS AUTO: ABNORMAL /HPF
BILIRUB UR QL STRIP: NEGATIVE
CAOX CRY UR QL COMP ASSIST: ABNORMAL
CLARITY UR REFRACT.AUTO: ABNORMAL
COLOR UR AUTO: YELLOW
GLUCOSE UR QL STRIP: NEGATIVE
HGB UR QL STRIP: ABNORMAL
KETONES UR QL STRIP: NEGATIVE
LEUKOCYTE ESTERASE UR QL STRIP: ABNORMAL
MICROSCOPIC COMMENT: ABNORMAL
NITRITE UR QL STRIP: NEGATIVE
PH UR STRIP: 5 [PH] (ref 5–8)
PROT UR QL STRIP: NEGATIVE
RBC #/AREA URNS AUTO: 10 /HPF (ref 0–4)
SP GR UR STRIP: 1.02 (ref 1–1.03)
SQUAMOUS #/AREA URNS AUTO: 28 /HPF
URN SPEC COLLECT METH UR: ABNORMAL
WBC #/AREA URNS AUTO: 14 /HPF (ref 0–5)

## 2021-12-14 PROCEDURE — 87147 CULTURE TYPE IMMUNOLOGIC: CPT | Performed by: INTERNAL MEDICINE

## 2021-12-14 PROCEDURE — 81001 URINALYSIS AUTO W/SCOPE: CPT | Performed by: INTERNAL MEDICINE

## 2021-12-14 PROCEDURE — 87086 URINE CULTURE/COLONY COUNT: CPT | Performed by: INTERNAL MEDICINE

## 2021-12-14 PROCEDURE — 99999 PR PBB SHADOW E&M-EST. PATIENT-LVL III: CPT | Mod: PBBFAC,,, | Performed by: INTERNAL MEDICINE

## 2021-12-14 PROCEDURE — 99213 PR OFFICE/OUTPT VISIT, EST, LEVL III, 20-29 MIN: ICD-10-PCS | Mod: S$GLB,,, | Performed by: INTERNAL MEDICINE

## 2021-12-14 PROCEDURE — 99213 OFFICE O/P EST LOW 20 MIN: CPT | Mod: S$GLB,,, | Performed by: INTERNAL MEDICINE

## 2021-12-14 PROCEDURE — 99999 PR PBB SHADOW E&M-EST. PATIENT-LVL III: ICD-10-PCS | Mod: PBBFAC,,, | Performed by: INTERNAL MEDICINE

## 2021-12-14 PROCEDURE — 87088 URINE BACTERIA CULTURE: CPT | Performed by: INTERNAL MEDICINE

## 2021-12-14 RX ORDER — NITROFURANTOIN 25; 75 MG/1; MG/1
100 CAPSULE ORAL 2 TIMES DAILY
Qty: 10 CAPSULE | Refills: 0 | Status: SHIPPED | OUTPATIENT
Start: 2021-12-14 | End: 2021-12-19

## 2021-12-14 RX ORDER — PHENAZOPYRIDINE HYDROCHLORIDE 200 MG/1
200 TABLET, FILM COATED ORAL 3 TIMES DAILY PRN
Qty: 15 TABLET | Refills: 0 | Status: SHIPPED | OUTPATIENT
Start: 2021-12-14 | End: 2021-12-24

## 2021-12-15 LAB — BACTERIA UR CULT: ABNORMAL

## 2021-12-16 ENCOUNTER — PATIENT MESSAGE (OUTPATIENT)
Dept: ENDOSCOPY | Facility: HOSPITAL | Age: 66
End: 2021-12-16
Payer: COMMERCIAL

## 2021-12-16 DIAGNOSIS — Z12.11 SPECIAL SCREENING FOR MALIGNANT NEOPLASMS, COLON: Primary | ICD-10-CM

## 2021-12-16 RX ORDER — POLYETHYLENE GLYCOL 3350, SODIUM SULFATE ANHYDROUS, SODIUM BICARBONATE, SODIUM CHLORIDE, POTASSIUM CHLORIDE 236; 22.74; 6.74; 5.86; 2.97 G/4L; G/4L; G/4L; G/4L; G/4L
4 POWDER, FOR SOLUTION ORAL ONCE
Qty: 4000 ML | Refills: 0 | Status: SHIPPED | OUTPATIENT
Start: 2021-12-16 | End: 2021-12-16

## 2022-01-10 DIAGNOSIS — Z01.812 PRE-PROCEDURE LAB EXAM: ICD-10-CM

## 2022-01-18 ENCOUNTER — PATIENT MESSAGE (OUTPATIENT)
Dept: ADMINISTRATIVE | Facility: HOSPITAL | Age: 67
End: 2022-01-18
Payer: COMMERCIAL

## 2022-01-20 ENCOUNTER — HOSPITAL ENCOUNTER (OUTPATIENT)
Dept: RADIOLOGY | Facility: HOSPITAL | Age: 67
Discharge: HOME | End: 2022-01-20
Attending: INTERNAL MEDICINE
Payer: COMMERCIAL

## 2022-01-20 DIAGNOSIS — Z12.31 VISIT FOR SCREENING MAMMOGRAM: ICD-10-CM

## 2022-01-20 PROCEDURE — 77067 SCR MAMMO BI INCL CAD: CPT | Mod: 26,,, | Performed by: RADIOLOGY

## 2022-01-20 PROCEDURE — 77063 BREAST TOMOSYNTHESIS BI: CPT | Mod: 26,,, | Performed by: RADIOLOGY

## 2022-01-20 PROCEDURE — 77063 BREAST TOMOSYNTHESIS BI: CPT | Mod: TC

## 2022-01-20 PROCEDURE — 77067 SCR MAMMO BI INCL CAD: CPT | Mod: TC

## 2022-01-20 PROCEDURE — 77067 MAMMO DIGITAL SCREENING BILAT WITH TOMO: ICD-10-PCS | Mod: 26,,, | Performed by: RADIOLOGY

## 2022-01-20 PROCEDURE — 77063 MAMMO DIGITAL SCREENING BILAT WITH TOMO: ICD-10-PCS | Mod: 26,,, | Performed by: RADIOLOGY

## 2022-01-21 ENCOUNTER — LAB VISIT (OUTPATIENT)
Dept: FAMILY MEDICINE | Facility: CLINIC | Age: 67
End: 2022-01-21
Payer: COMMERCIAL

## 2022-01-21 DIAGNOSIS — Z01.812 PRE-PROCEDURE LAB EXAM: ICD-10-CM

## 2022-01-21 LAB
SARS-COV-2 RNA RESP QL NAA+PROBE: NOT DETECTED
SARS-COV-2- CYCLE NUMBER: NORMAL

## 2022-01-21 PROCEDURE — U0003 INFECTIOUS AGENT DETECTION BY NUCLEIC ACID (DNA OR RNA); SEVERE ACUTE RESPIRATORY SYNDROME CORONAVIRUS 2 (SARS-COV-2) (CORONAVIRUS DISEASE [COVID-19]), AMPLIFIED PROBE TECHNIQUE, MAKING USE OF HIGH THROUGHPUT TECHNOLOGIES AS DESCRIBED BY CMS-2020-01-R: HCPCS | Performed by: FAMILY MEDICINE

## 2022-01-21 PROCEDURE — U0005 INFEC AGEN DETEC AMPLI PROBE: HCPCS | Performed by: FAMILY MEDICINE

## 2022-01-24 ENCOUNTER — ANESTHESIA EVENT (OUTPATIENT)
Dept: ENDOSCOPY | Facility: HOSPITAL | Age: 67
End: 2022-01-24
Payer: COMMERCIAL

## 2022-01-24 ENCOUNTER — ANESTHESIA (OUTPATIENT)
Dept: ENDOSCOPY | Facility: HOSPITAL | Age: 67
End: 2022-01-24
Payer: COMMERCIAL

## 2022-01-24 ENCOUNTER — HOSPITAL ENCOUNTER (OUTPATIENT)
Facility: HOSPITAL | Age: 67
Discharge: HOME | End: 2022-01-24
Attending: INTERNAL MEDICINE | Admitting: INTERNAL MEDICINE
Payer: COMMERCIAL

## 2022-01-24 VITALS
HEIGHT: 66 IN | DIASTOLIC BLOOD PRESSURE: 75 MMHG | SYSTOLIC BLOOD PRESSURE: 137 MMHG | HEART RATE: 74 BPM | TEMPERATURE: 99 F | OXYGEN SATURATION: 95 % | WEIGHT: 190 LBS | BODY MASS INDEX: 30.53 KG/M2 | RESPIRATION RATE: 18 BRPM

## 2022-01-24 DIAGNOSIS — M85.80 OSTEOPENIA, UNSPECIFIED LOCATION: Primary | ICD-10-CM

## 2022-01-24 DIAGNOSIS — Z12.11 SCREEN FOR COLON CANCER: ICD-10-CM

## 2022-01-24 PROCEDURE — 37000008 HC ANESTHESIA 1ST 15 MINUTES: Performed by: INTERNAL MEDICINE

## 2022-01-24 PROCEDURE — E9220 PRA ENDO ANESTHESIA: ICD-10-PCS | Mod: 33,,, | Performed by: NURSE ANESTHETIST, CERTIFIED REGISTERED

## 2022-01-24 PROCEDURE — 63600175 PHARM REV CODE 636 W HCPCS: Performed by: NURSE ANESTHETIST, CERTIFIED REGISTERED

## 2022-01-24 PROCEDURE — 88305 TISSUE EXAM BY PATHOLOGIST: ICD-10-PCS | Mod: 26,,, | Performed by: PATHOLOGY

## 2022-01-24 PROCEDURE — 45380 COLONOSCOPY AND BIOPSY: CPT | Mod: 33,,, | Performed by: INTERNAL MEDICINE

## 2022-01-24 PROCEDURE — 27201012 HC FORCEPS, HOT/COLD, DISP: Performed by: INTERNAL MEDICINE

## 2022-01-24 PROCEDURE — E9220 PRA ENDO ANESTHESIA: HCPCS | Mod: 33,,, | Performed by: NURSE ANESTHETIST, CERTIFIED REGISTERED

## 2022-01-24 PROCEDURE — 45380 PR COLONOSCOPY,BIOPSY: ICD-10-PCS | Mod: 33,,, | Performed by: INTERNAL MEDICINE

## 2022-01-24 PROCEDURE — 88305 TISSUE EXAM BY PATHOLOGIST: CPT | Performed by: PATHOLOGY

## 2022-01-24 PROCEDURE — 37000009 HC ANESTHESIA EA ADD 15 MINS: Performed by: INTERNAL MEDICINE

## 2022-01-24 PROCEDURE — 88305 TISSUE EXAM BY PATHOLOGIST: CPT | Mod: 26,,, | Performed by: PATHOLOGY

## 2022-01-24 PROCEDURE — 45380 COLONOSCOPY AND BIOPSY: CPT | Performed by: INTERNAL MEDICINE

## 2022-01-24 PROCEDURE — 25000003 PHARM REV CODE 250: Performed by: NURSE ANESTHETIST, CERTIFIED REGISTERED

## 2022-01-24 RX ORDER — PROPOFOL 10 MG/ML
VIAL (ML) INTRAVENOUS CONTINUOUS PRN
Status: DISCONTINUED | OUTPATIENT
Start: 2022-01-24 | End: 2022-01-24

## 2022-01-24 RX ORDER — SODIUM CHLORIDE 9 MG/ML
INJECTION, SOLUTION INTRAVENOUS CONTINUOUS
Status: DISCONTINUED | OUTPATIENT
Start: 2022-01-24 | End: 2022-01-24 | Stop reason: HOSPADM

## 2022-01-24 RX ORDER — PROPOFOL 10 MG/ML
VIAL (ML) INTRAVENOUS
Status: DISCONTINUED | OUTPATIENT
Start: 2022-01-24 | End: 2022-01-24

## 2022-01-24 RX ORDER — LIDOCAINE HYDROCHLORIDE 20 MG/ML
INJECTION INTRAVENOUS
Status: DISCONTINUED | OUTPATIENT
Start: 2022-01-24 | End: 2022-01-24

## 2022-01-24 RX ADMIN — PROPOFOL 100 MG: 10 INJECTION, EMULSION INTRAVENOUS at 01:01

## 2022-01-24 RX ADMIN — Medication 150 MCG/KG/MIN: at 01:01

## 2022-01-24 RX ADMIN — LIDOCAINE HYDROCHLORIDE 100 MG: 20 INJECTION, SOLUTION INTRAVENOUS at 01:01

## 2022-01-24 NOTE — TRANSFER OF CARE
"Anesthesia Transfer of Care Note    Patient: Ginger Marshall    Procedure(s) Performed: Procedure(s) (LRB):  COLONOSCOPY (N/A)    Patient location: PACU    Anesthesia Type: general    Transport from OR: Transported from OR on room air with adequate spontaneous ventilation    Post pain: adequate analgesia    Post assessment: no apparent anesthetic complications and tolerated procedure well    Post vital signs: stable    Level of consciousness: awake, alert and oriented    Nausea/Vomiting: no nausea/vomiting    Complications: none    Transfer of care protocol was followed      Last vitals:   Visit Vitals  BP (!) 142/70   Pulse 72   Temp 36.7 °C (98.1 °F)   Resp 16   Ht 5' 6" (1.676 m)   Wt 86.2 kg (190 lb)   LMP  (LMP Unknown)   SpO2 96%   Breastfeeding No   BMI 30.67 kg/m²     "

## 2022-01-24 NOTE — ANESTHESIA PREPROCEDURE EVALUATION
2022  Ginger Marshall is a 66 y.o., female.  Past Medical History:   Diagnosis Date    Basal cell carcinoma     Osteopenia      Past Surgical History:   Procedure Laterality Date     SECTION      LASIK      TONSILLECTOMY           Anesthesia Evaluation    I have reviewed the Patient Summary Reports.    I have reviewed the Nursing Notes.    I have reviewed the Medications.     Review of Systems  Anesthesia Hx:  No problems with previous Anesthesia    Hematology/Oncology:  Hematology Normal   Oncology Normal     EENT/Dental:EENT/Dental Normal   Cardiovascular:   Hypertension  Hypertension    Pulmonary:   Sleep Apnea  Obstructive Sleep Apnea (NORBERT), CPAP used.   Renal/:  Renal/ Normal     Hepatic/GI:  Hepatic/GI Normal    Musculoskeletal:  Musculoskeletal Normal    Neurological:  Neurology Normal    Endocrine:  Endocrine Normal    Dermatological:  Skin Normal    Psych:  Psychiatric Normal           Physical Exam  General:  Well nourished    Airway/Jaw/Neck:  Airway Findings: Mouth Opening: Normal Tongue: Normal  General Airway Assessment: Adult  Mallampati: I      Dental:  Dental Findings: In tact        Mental Status:  Mental Status Findings:  Cooperative, Alert and Oriented         Anesthesia Plan  Type of Anesthesia, risks & benefits discussed:  Anesthesia Type:  general    Patient's Preference:   Plan Factors:          Intra-op Monitoring Plan: standard ASA monitors  Intra-op Monitoring Plan Comments:   Post Op Pain Control Plan:   Post Op Pain Control Plan Comments:     Induction:   IV  Beta Blocker:  Patient is not currently on a Beta-Blocker (No further documentation required).       Informed Consent: Patient understands risks and agrees with Anesthesia plan.  Questions answered. Anesthesia consent signed with patient.  ASA Score: 2     Day of Surgery Review of History & Physical: I have  interviewed and examined the patient. I have reviewed the patient's H&P dated:            Ready For Surgery From Anesthesia Perspective.

## 2022-01-24 NOTE — PROVATION PATIENT INSTRUCTIONS
Discharge Summary/Instructions after an Endoscopic Procedure  Patient Name: Ginger Marshall  Patient MRN: [id number]  Patient YOB: 1955 Monday, January 24, 2022  Abelardo Dean MD  Dear patient,  As a result of recent federal legislation (The Federal Cures Act), you may   receive lab or pathology results from your procedure in your [facility]   account before your physician is able to contact you. Your physician or   their representative will relay the results to you with their   recommendations at their soonest availability.  Thank you,  RESTRICTIONS:  During your procedure today, you received medications for sedation.  These   medications may affect your judgment, balance and coordination.  Therefore,   for 24 hours, you have the following restrictions:   - DO NOT drive a car, operate machinery, make legal/financial decisions,   sign important papers or drink alcohol.    ACTIVITY:  Today: no heavy lifting, straining or running due to procedural   sedation/anesthesia.  The following day: return to full activity including work.  DIET:  Eat and drink normally unless instructed otherwise.     TREATMENT FOR COMMON SIDE EFFECTS:  - Mild abdominal pain, nausea, belching, bloating or excessive gas:  rest,   eat lightly and use a heating pad.  - Sore Throat: treat with throat lozenges and/or gargle with warm salt   water.  - Because air was used during the procedure, expelling large amounts of air   from your rectum or belching is normal.  - If a bowel prep was taken, you may not have a bowel movement for 1-3 days.    This is normal.  SYMPTOMS TO WATCH FOR AND REPORT TO YOUR PHYSICIAN:  1. Abdominal pain or bloating, other than gas cramps.  2. Chest pain.  3. Back pain.  4. Signs of infection such as: chills or fever occurring within 24 hours   after the procedure.  5. Rectal bleeding, which would show as bright red, maroon, or black stools.   (A tablespoon of blood from the rectum is not serious, especially if    hemorrhoids are present.)  6. Vomiting.  7. Weakness or dizziness.  GO DIRECTLY TO THE NEAREST EMERGENCY ROOM IF YOU HAVE ANY OF THE FOLLOWING:      Difficulty breathing              Chills and/or fever over 101 F   Persistent vomiting and/or vomiting blood   Severe abdominal pain   Severe chest pain   Black, tarry stools   Bleeding- more than one tablespoon   Any other symptom or condition that you feel may need urgent attention  Your doctor recommends these additional instructions:  If any biopsies were taken, your doctors clinic will contact you in 1 to 2   weeks with any results.  - Patient has a contact number available for emergencies.  The signs and   symptoms of potential delayed complications were discussed with the   patient.  Return to normal activities tomorrow.  Written discharge   instructions were provided to the patient.   - Resume previous diet.   - Continue present medications.   - Await pathology results.   - Repeat colonoscopy in 7 years for surveillance.   - Discharge patient to home.  For questions, problems or results please call your physician - Abelardo Dean MD at Work:  [phone].  [facility] [place], EMERGENCY ROOM PHONE NUMBER: [phone]  IF A COMPLICATION OR EMERGENCY SITUATION ARISES AND YOU ARE UNABLE TO REACH   YOUR PHYSICIAN - GO DIRECTLY TO THE EMERGENCY ROOM.  Abelardo Dean MD  1/24/2022 1:57:38 PM  This report has been verified and signed electronically.  Dear patient,  As a result of recent federal legislation (The Federal Cures Act), you may   receive lab or pathology results from your procedure in your [facility]   account before your physician is able to contact you. Your physician or   their representative will relay the results to you with their   recommendations at their soonest availability.  Thank you,  PROVATION

## 2022-01-24 NOTE — ANESTHESIA POSTPROCEDURE EVALUATION
Anesthesia Post Evaluation    Patient: Ginger Marhsall    Procedure(s) Performed: Procedure(s) (LRB):  COLONOSCOPY (N/A)    Final Anesthesia Type: general      Patient location during evaluation: PACU  Patient participation: Yes- Able to Participate  Level of consciousness: awake and alert  Post-procedure vital signs: reviewed and stable  Pain management: adequate  Airway patency: patent    PONV status at discharge: No PONV  Anesthetic complications: no      Cardiovascular status: blood pressure returned to baseline  Respiratory status: unassisted, spontaneous ventilation and room air  Hydration status: euvolemic  Follow-up not needed.          Vitals Value Taken Time   /75 01/24/22 1415   Temp 37.2 °C (98.9 °F) 01/24/22 1403   Pulse 74 01/24/22 1415   Resp 18 01/24/22 1415   SpO2 95 % 01/24/22 1415         No case tracking events are documented in the log.      Pain/Ember Score: Ember Score: 10 (1/24/2022  2:16 PM)

## 2022-01-24 NOTE — DISCHARGE INSTRUCTIONS
Patient Education       Colonoscopy   Why is this procedure done?   Colonoscopy is done so your doctor can see the inside of your large intestines, also called your colon, and your rectum. It uses a lighted tube called a scope, which has a tiny camera that can be moved through the large intestine. This may be done to:  · Check for colon cancer or growths called polyps  · Look for the source of rectal bleeding  · Find the cause of changes in your bowel movements  · Find the cause of belly or rectal pain  · Check results from other tests  · Check your response to treatment for other diseases  · Learn about weight loss  What happens before the procedure?   · Your doctor will ask you about your health history and do an exam. The doctor may order tests for your stool. Talk to the doctor about  ? All the drugs you are taking. Be sure to include all prescription and over-the-counter (OTC) drugs, and herbal supplements. Tell the doctor about any drug allergy. Bring a list of drugs you take with you.  ? Any bleeding problems. Be sure to tell your doctor if you are taking any drugs that may cause bleeding. Some of these are warfarin, rivaroxaban, apixaban, ticagrelor, clopidogrel, ketorolac, ibuprofen, naproxen, or aspirin. Certain vitamins and herbs, such as garlic and fish oil, may also add to the risk for bleeding. You may need to stop these drugs as well. Talk to your doctor about them.  · The colon needs to be cleaned out before this test. Your doctor will tell you to take drugs that will cause watery loose stools. These may be liquids, pills, or both. You may need to take these the day before and the day of your test.  · You will be placed on a clear liquid diet the day before the exam and you will need to only have clear liquids until the test is done. Clear liquids include water, sports drinks, broth, soft drinks, and juices, but avoid anything that is red or purple in color. Do not drink alcohol.  · Your doctor may  ask you not to eat or drink any food other than the drugs or liquids that clean out your colon.  · You will not be allowed to drive after the procedure. Ask a family member or a friend to help you get home and stay with you if possible.  What happens during the procedure?   · The staff will put an IV in your arm to give you fluids and drugs. You may be given a drug to make you sleepy.  · You will lie on your side with your knees bent and pulled up toward your chest.  · The doctor will use a small thin tube, called a scope, with a light and a camera on it. The tube is put into your anus and moved through your rectum and into the large intestine or colon.  · Small amounts of air are put into your colon. The camera lets your doctor look at the lining of your colon.  · Your doctor may take small tissue samples and remove small growths.     · The tube is then taken out.  · The procedure may take 30 to 45 minutes.  What happens after the procedure?   · You will go to a recovery area and the staff will watch you closely.  · You will want to rest after your procedure.  · You may feel groggy.  · You should be able to eat your usual diet after the test.  · You will have gas and you may have mild cramping. This is normal.  · A small amount of bleeding may happen during the first few days after your procedure.  · If tissue was removed, it will be sent to a lab to be checked. Your doctor will tell you the results after a week or two.  What problems could happen?   · Tear inside your colon  · Bleeding can happen for a few days afterwards  Where can I learn more?   American Cancer Society  https://www.cancer.org/cancer/colon-rectal-cancer/vkampktbl-takjeyqtg-ejradfr/udmxtlkhu-jouos-doqo.html   American Society of Clinical Oncology  https://www.cancer.net/navigating-cancer-care/diagnosing-cancer/tests-and-procedures/colonoscopy   Last Reviewed Date   2021-03-10  Consumer Information Use and Disclaimer   This information is not  specific medical advice and does not replace information you receive from your health care provider. This is only a brief summary of general information. It does NOT include all information about conditions, illnesses, injuries, tests, procedures, treatments, therapies, discharge instructions or life-style choices that may apply to you. You must talk with your health care provider for complete information about your health and treatment options. This information should not be used to decide whether or not to accept your health care providers advice, instructions or recommendations. Only your health care provider has the knowledge and training to provide advice that is right for you.  Copyright   Copyright © 2021 UpToDate, Inc. and its affiliates and/or licensors. All rights reserved.     ICU Vital Signs Last 24 Hrs  T(C): 36.1 (30 Apr 2021 05:07), Max: 36.1 (30 Apr 2021 05:07)  T(F): 97 (30 Apr 2021 05:07), Max: 97 (30 Apr 2021 05:07)  HR: 70 (30 Apr 2021 05:07) (70 - 83)  BP: 109/59 (30 Apr 2021 05:07) (109/59 - 130/80)  BP(mean): --  ABP: --  ABP(mean): --  RR: 16 (30 Apr 2021 05:07) (16 - 16)  SpO2: --

## 2022-01-24 NOTE — H&P
Short Stay Endoscopy History and Physical    PCP - Rachel Camacho MD  Referring Physician - Rachel Camcaho MD   [facility]  [place] [address]    Procedure - Endoscopy/Colonoscopy  ASA - per anesthesia  Mallampati - per anesthesia  History of Anesthesia problems - no  Family history Anesthesia problems -  no   Plan of anesthesia - General    HPI  66 y.o. female  Reason for procedure:   CRC Screen    ROS:  Constitutional: No fevers, chills, No weight loss  CV: No chest pain  Pulm: No cough, No shortness of breath  GI: see HPI    Medical History:  has a past medical history of Basal cell carcinoma and Osteopenia.    Surgical History:  has a past surgical history that includes LASIK; Tonsillectomy; and  section.    Family History: family history includes Brain cancer in her mother; Breast cancer in her other and other; Stroke in her father..    Social History:  reports that she has never smoked. She has never used smokeless tobacco. She reports current alcohol use. She reports that she does not use drugs.    Review of patient's allergies indicates:  No Known Allergies    Medications:   Medications Prior to Admission   Medication Sig Dispense Refill Last Dose    amLODIPine (NORVASC) 5 MG tablet TAKE 1 TABLET(5 MG) BY MOUTH EVERY DAY 30 tablet 2     atorvastatin (LIPITOR) 40 MG tablet TAKE 1 TABLET BY MOUTH EVERY DAY 90 tablet 1     levothyroxine (SYNTHROID) 112 MCG tablet TAKE 1 TABLET BY MOUTH EVERY DAY 90 tablet 1     multivitamin capsule Take 1 capsule by mouth once daily.          Physical Exam:    Vital Signs: There were no vitals filed for this visit.    General Appearance: Well appearing in no acute distress  Abdomen: Soft, non tender, non distended with normal bowel sounds, no masses    Labs:  Lab Results   Component Value Date    WBC 6.25 2020    HGB 13.4 2020    HCT 43.7 2020     2020    CHOL 200 (H) 2021    TRIG 117 2021    HDL 67 2021     ALT 30 01/07/2021    AST 27 01/07/2021     01/07/2021    K 3.9 01/07/2021     01/07/2021    CREATININE 0.8 01/07/2021    BUN 16 01/07/2021    CO2 25 01/07/2021    TSH 1.392 01/07/2021    HGBA1C 5.7 (H) 01/07/2021       I have explained the risks and benefits of this endoscopic procedure to the patient including but not limited to bleeding, inflammation, infection, perforation, and death.      Abelardo Dean MD

## 2022-02-01 LAB
FINAL PATHOLOGIC DIAGNOSIS: NORMAL
GROSS: NORMAL
Lab: NORMAL
MICROSCOPIC EXAM: NORMAL

## 2022-03-15 DIAGNOSIS — I10 ESSENTIAL HYPERTENSION: ICD-10-CM

## 2022-03-15 NOTE — TELEPHONE ENCOUNTER
No new care gaps identified.  Powered by [employer]. Reference number: [id number].   3/15/2022 4:35:06 AM CDT

## 2022-03-22 RX ORDER — AMLODIPINE BESYLATE 5 MG/1
TABLET ORAL
Qty: 90 TABLET | Refills: 0 | Status: SHIPPED | OUTPATIENT
Start: 2022-03-22 | End: 2022-06-17

## 2022-03-22 NOTE — TELEPHONE ENCOUNTER
Refill Authorization Note   Ginger Marshall  is requesting a refill authorization.  Brief Assessment and Rationale for Refill:  Approve     Medication Therapy Plan:  approve    Medication Reconciliation Completed: No   Comments:   --->Care Gap information included below if applicable.       Requested Prescriptions   Pending Prescriptions Disp Refills    amLODIPine (NORVASC) 5 MG tablet [Pharmacy Med Name: AMLODIPINE BESYLATE 5MG TABLETS] 90 tablet 0     Sig: TAKE 1 TABLET(5 MG) BY MOUTH EVERY DAY       Cardiovascular:  Calcium Channel Blockers Passed - 3/22/2022  4:02 PM        Passed - Patient is at least 18 years old        Passed - Last BP in normal range within 360 days     BP Readings from Last 3 Encounters:   01/24/22 137/75   12/14/21 120/62   01/06/21 122/84               Passed - Valid encounter within last 15 months     Recent Visits  Date Type Provider Dept   01/06/21 Office Visit Rachel Camacho MD [facility] Internal Medicine   10/02/20 Office Visit Rachel Camacho MD [facility]   Showing recent visits within past 720 days and meeting all other requirements  Future Appointments  No visits were found meeting these conditions.  Showing future appointments within next 150 days and meeting all other requirements      Future Appointments              In 1 month Rachel Camacho MD [facility] - Internal Medicine, [facility]                    Appointments  past 12m or future 3m with PCP    Date Provider   Last Visit   1/6/2021 Rachel Camacho MD   Next Visit   5/5/2022 Rachel Camacho MD   ED visits in past 90 days: 0     Note composed:4:02 PM 03/22/2022

## 2022-04-07 ENCOUNTER — PATIENT MESSAGE (OUTPATIENT)
Dept: INTERNAL MEDICINE | Facility: CLINIC | Age: 67
End: 2022-04-07
Payer: COMMERCIAL

## 2022-04-14 ENCOUNTER — IMMUNIZATION (OUTPATIENT)
Dept: INTERNAL MEDICINE | Facility: CLINIC | Age: 67
End: 2022-04-14
Payer: COMMERCIAL

## 2022-04-14 DIAGNOSIS — Z23 NEED FOR VACCINATION: Primary | ICD-10-CM

## 2022-04-14 PROCEDURE — 0054A COVID-19, MRNA, LNP-S, PF, 30 MCG/0.3 ML DOSE VACCINE (PFIZER): CPT | Mod: PBBFAC | Performed by: INTERNAL MEDICINE

## 2022-04-25 ENCOUNTER — PATIENT MESSAGE (OUTPATIENT)
Dept: INTERNAL MEDICINE | Facility: CLINIC | Age: 67
End: 2022-04-25
Payer: COMMERCIAL

## 2022-04-26 ENCOUNTER — PATIENT MESSAGE (OUTPATIENT)
Dept: INTERNAL MEDICINE | Facility: CLINIC | Age: 67
End: 2022-04-26
Payer: COMMERCIAL

## 2022-04-26 DIAGNOSIS — G47.33 OSA (OBSTRUCTIVE SLEEP APNEA): Primary | ICD-10-CM

## 2022-04-26 NOTE — TELEPHONE ENCOUNTER
Called [phone] and spoke to allie and asked what the order needed to have on it for the pt to be fitted for a CPAP mask and she said that it depends on what the pt wants. I told her I would have the pt call them    I sent a message to

## 2022-04-26 NOTE — TELEPHONE ENCOUNTER
I called the phone number the pt gave me but that is the wrong dept so they gave me the phone number of [phone] and when I call that number it lets me hold for a while and then disconnects me  I will keep trying

## 2022-04-27 NOTE — TELEPHONE ENCOUNTER
Order printed  She is up to date on shingles vaccine- completed both doses of shingrix.  She is due for pneumonia vaccine- can be given at her annual clinic apt.

## 2022-04-27 NOTE — TELEPHONE ENCOUNTER
Please print order for CPCP mask so I can fax    One[employer] Respir[employer]ia Systemss DreamWear silicone pillows mask, size Medium.      Shoulder pain acute on chronic in addition to neck pain: 2n2 overuse   Likely rotator cuff tendonitis     1  Treat conservatively: RICE  2  Naproxen 500mg BID for 14 days  3   Exercise papers given to patient

## 2022-05-06 ENCOUNTER — PATIENT MESSAGE (OUTPATIENT)
Dept: INTERNAL MEDICINE | Facility: CLINIC | Age: 67
End: 2022-05-06
Payer: COMMERCIAL

## 2022-08-23 ENCOUNTER — OFFICE VISIT (OUTPATIENT)
Dept: INTERNAL MEDICINE | Facility: CLINIC | Age: 67
End: 2022-08-23
Payer: MEDICARE

## 2022-08-23 VITALS
TEMPERATURE: 98 F | DIASTOLIC BLOOD PRESSURE: 86 MMHG | HEIGHT: 66 IN | SYSTOLIC BLOOD PRESSURE: 122 MMHG | BODY MASS INDEX: 32.73 KG/M2 | HEART RATE: 73 BPM | OXYGEN SATURATION: 96 % | RESPIRATION RATE: 20 BRPM | WEIGHT: 203.69 LBS

## 2022-08-23 DIAGNOSIS — M85.88 OTHER SPECIFIED DISORDERS OF BONE DENSITY AND STRUCTURE, OTHER SITE: ICD-10-CM

## 2022-08-23 DIAGNOSIS — M54.16 CHRONIC LEFT-SIDED LUMBAR RADICULOPATHY: ICD-10-CM

## 2022-08-23 DIAGNOSIS — Z00.00 ANNUAL PHYSICAL EXAM: Primary | ICD-10-CM

## 2022-08-23 DIAGNOSIS — M85.80 OSTEOPENIA, UNSPECIFIED LOCATION: ICD-10-CM

## 2022-08-23 DIAGNOSIS — E03.9 HYPOTHYROIDISM, UNSPECIFIED TYPE: ICD-10-CM

## 2022-08-23 DIAGNOSIS — E78.5 HYPERLIPIDEMIA, UNSPECIFIED HYPERLIPIDEMIA TYPE: ICD-10-CM

## 2022-08-23 DIAGNOSIS — R73.03 PREDIABETES: ICD-10-CM

## 2022-08-23 DIAGNOSIS — I10 ESSENTIAL HYPERTENSION: ICD-10-CM

## 2022-08-23 DIAGNOSIS — Z91.89 PNEUMOCOCCAL VACCINATION INDICATED: ICD-10-CM

## 2022-08-23 PROCEDURE — 99215 OFFICE O/P EST HI 40 MIN: CPT | Mod: 25,S$GLB,, | Performed by: INTERNAL MEDICINE

## 2022-08-23 PROCEDURE — 1101F PR PT FALLS ASSESS DOC 0-1 FALLS W/OUT INJ PAST YR: ICD-10-PCS | Mod: CPTII,S$GLB,, | Performed by: INTERNAL MEDICINE

## 2022-08-23 PROCEDURE — 3288F PR FALLS RISK ASSESSMENT DOCUMENTED: ICD-10-PCS | Mod: CPTII,S$GLB,, | Performed by: INTERNAL MEDICINE

## 2022-08-23 PROCEDURE — G0009 PNEUMOCOCCAL CONJUGATE VACCINE 20-VALENT: ICD-10-PCS | Mod: S$GLB,,, | Performed by: INTERNAL MEDICINE

## 2022-08-23 PROCEDURE — 99999 PR PBB SHADOW E&M-EST. PATIENT-LVL V: ICD-10-PCS | Mod: PBBFAC,,, | Performed by: INTERNAL MEDICINE

## 2022-08-23 PROCEDURE — G0009 ADMIN PNEUMOCOCCAL VACCINE: HCPCS | Mod: S$GLB,,, | Performed by: INTERNAL MEDICINE

## 2022-08-23 PROCEDURE — 99215 PR OFFICE/OUTPT VISIT, EST, LEVL V, 40-54 MIN: ICD-10-PCS | Mod: 25,S$GLB,, | Performed by: INTERNAL MEDICINE

## 2022-08-23 PROCEDURE — 1159F PR MEDICATION LIST DOCUMENTED IN MEDICAL RECORD: ICD-10-PCS | Mod: CPTII,S$GLB,, | Performed by: INTERNAL MEDICINE

## 2022-08-23 PROCEDURE — 90677 PNEUMOCOCCAL CONJUGATE VACCINE 20-VALENT: ICD-10-PCS | Mod: S$GLB,,, | Performed by: INTERNAL MEDICINE

## 2022-08-23 PROCEDURE — 3079F DIAST BP 80-89 MM HG: CPT | Mod: CPTII,S$GLB,, | Performed by: INTERNAL MEDICINE

## 2022-08-23 PROCEDURE — 3079F PR MOST RECENT DIASTOLIC BLOOD PRESSURE 80-89 MM HG: ICD-10-PCS | Mod: CPTII,S$GLB,, | Performed by: INTERNAL MEDICINE

## 2022-08-23 PROCEDURE — 1159F MED LIST DOCD IN RCRD: CPT | Mod: CPTII,S$GLB,, | Performed by: INTERNAL MEDICINE

## 2022-08-23 PROCEDURE — 1160F RVW MEDS BY RX/DR IN RCRD: CPT | Mod: CPTII,S$GLB,, | Performed by: INTERNAL MEDICINE

## 2022-08-23 PROCEDURE — 1160F PR REVIEW ALL MEDS BY PRESCRIBER/CLIN PHARMACIST DOCUMENTED: ICD-10-PCS | Mod: CPTII,S$GLB,, | Performed by: INTERNAL MEDICINE

## 2022-08-23 PROCEDURE — 3074F PR MOST RECENT SYSTOLIC BLOOD PRESSURE < 130 MM HG: ICD-10-PCS | Mod: CPTII,S$GLB,, | Performed by: INTERNAL MEDICINE

## 2022-08-23 PROCEDURE — 99999 PR PBB SHADOW E&M-EST. PATIENT-LVL V: CPT | Mod: PBBFAC,,, | Performed by: INTERNAL MEDICINE

## 2022-08-23 PROCEDURE — 3008F BODY MASS INDEX DOCD: CPT | Mod: CPTII,S$GLB,, | Performed by: INTERNAL MEDICINE

## 2022-08-23 PROCEDURE — 3074F SYST BP LT 130 MM HG: CPT | Mod: CPTII,S$GLB,, | Performed by: INTERNAL MEDICINE

## 2022-08-23 PROCEDURE — 3288F FALL RISK ASSESSMENT DOCD: CPT | Mod: CPTII,S$GLB,, | Performed by: INTERNAL MEDICINE

## 2022-08-23 PROCEDURE — 3008F PR BODY MASS INDEX (BMI) DOCUMENTED: ICD-10-PCS | Mod: CPTII,S$GLB,, | Performed by: INTERNAL MEDICINE

## 2022-08-23 PROCEDURE — 1101F PT FALLS ASSESS-DOCD LE1/YR: CPT | Mod: CPTII,S$GLB,, | Performed by: INTERNAL MEDICINE

## 2022-08-23 PROCEDURE — 90677 PCV20 VACCINE IM: CPT | Mod: S$GLB,,, | Performed by: INTERNAL MEDICINE

## 2022-08-23 RX ORDER — AMLODIPINE BESYLATE 5 MG/1
5 TABLET ORAL DAILY
Qty: 90 TABLET | Refills: 3 | Status: SHIPPED | OUTPATIENT
Start: 2022-08-23 | End: 2023-08-29 | Stop reason: SDUPTHER

## 2022-08-23 RX ORDER — ATORVASTATIN CALCIUM 40 MG/1
40 TABLET, FILM COATED ORAL DAILY
Qty: 90 TABLET | Refills: 3 | Status: SHIPPED | OUTPATIENT
Start: 2022-08-23 | End: 2022-10-11

## 2022-08-23 RX ORDER — LEVOTHYROXINE SODIUM 112 UG/1
112 TABLET ORAL DAILY
Qty: 90 TABLET | Refills: 3 | Status: SHIPPED | OUTPATIENT
Start: 2022-08-23 | End: 2023-08-29 | Stop reason: SDUPTHER

## 2022-08-23 NOTE — PROGRESS NOTES
Subjective:       Patient ID: Ginger Marshall is a 67 y.o. female.    Chief Complaint: Annual Exam    HPI      67 y.o. female here for healthcare maintenance and f/u chronic medical conditions.    Health Maintenance/ Screening:   Labs: due  Breast Cancer: Mammogram 2022   Colorectal Cancer: 2022   Bone Density: DEXA  due     Vaccines: reviewed  Pneumococcal: due       Health Maintenance Topics with due status: Not Due       Topic Last Completion Date    TETANUS VACCINE 2016    Lipid Panel 2021    Influenza Vaccine 2021    Mammogram 2022    Colorectal Cancer Screening 2022       Health Maintenance Due   Topic Date Due    DEXA Scan  Never done       Health Maintenance Due   Topic Date Due    DEXA Scan  Never done           Past Medical History:   Diagnosis Date    Basal cell carcinoma     Osteopenia      Past Surgical History:   Procedure Laterality Date     SECTION      COLONOSCOPY N/A 2022    Procedure: COLONOSCOPY;  Surgeon: Abelardo Dean MD;  Location: [address];  Service: Endoscopy;  Laterality: N/A;  fully vaccinated, prep inst portal -ml  -covid marii-summer    LASIK      TONSILLECTOMY       Family History   Problem Relation Age of Onset    Stroke Father     Brain cancer Mother     Breast cancer Other     Breast cancer Other      Social History     Socioeconomic History    Marital status:    Tobacco Use    Smoking status: Never Smoker    Smokeless tobacco: Never Used   Substance and Sexual Activity    Alcohol use: Yes     Comment: Social    Drug use: Never    Sexual activity: Yes     Partners: Male     Social Determinants of Health     Financial Resource Strain: Low Risk     Difficulty of Paying Living Expenses: Not hard at all   Food Insecurity: No Food Insecurity    Worried About Running Out of Food in the Last Year: Never true    Ran Out of Food in the Last Year: Never true   Transportation Needs: No Transportation  Needs    Lack of Transportation (Medical): No    Lack of Transportation (Non-Medical): No   Physical Activity: Insufficiently Active    Days of Exercise per Week: 1 day    Minutes of Exercise per Session: 30 min   Stress: No Stress Concern Present    Feeling of Stress : Only a little   Social Connections: Unknown    Frequency of Communication with Friends and Family: More than three times a week    Frequency of Social Gatherings with Friends and Family: Three times a week    Active Member of Clubs or Organizations: Yes    Attends Club or Organization Meetings: 1 to 4 times per year    Marital Status:    Housing Stability: Low Risk     Unable to Pay for Housing in the Last Year: No    Number of Places Lived in the Last Year: 1    Unstable Housing in the Last Year: No     Review of patient's allergies indicates:  No Known Allergies    Current Outpatient Medications:     multivitamin capsule, Take 1 capsule by mouth once daily., Disp: , Rfl:     amLODIPine (NORVASC) 5 MG tablet, Take 1 tablet (5 mg total) by mouth once daily., Disp: 90 tablet, Rfl: 3    atorvastatin (LIPITOR) 40 MG tablet, Take 1 tablet (40 mg total) by mouth once daily., Disp: 90 tablet, Rfl: 3    levothyroxine (SYNTHROID) 112 MCG tablet, Take 1 tablet (112 mcg total) by mouth once daily., Disp: 90 tablet, Rfl: 3        Review of Systems   Constitutional: Negative for diaphoresis and fever.   HENT: Negative for trouble swallowing.    Respiratory: Negative for cough and shortness of breath.    Cardiovascular: Negative for chest pain and leg swelling.   Gastrointestinal: Negative for abdominal pain and blood in stool.   Genitourinary: Negative for difficulty urinating and dysuria.   Musculoskeletal: Positive for back pain. Negative for gait problem.   Skin: Negative for pallor.   Neurological: Positive for numbness (left leg radicular symptoms). Negative for syncope and weakness.          Objective:        Vitals:    08/23/22  "1417   BP: 122/86   BP Location: Right arm   Patient Position: Sitting   BP Method: Medium (Manual)   Pulse: 73   Resp: 20   Temp: 97.9 °F (36.6 °C)   TempSrc: Tympanic   SpO2: 96%   Weight: 92.4 kg (203 lb 11.3 oz)   Height: 5' 6" (1.676 m)       Body mass index is 32.88 kg/m².    Physical Exam  Constitutional:       General: She is not in acute distress.     Appearance: She is well-developed. She is not diaphoretic.   HENT:      Head: Normocephalic and atraumatic.      Right Ear: External ear normal.      Left Ear: External ear normal.   Eyes:      Conjunctiva/sclera: Conjunctivae normal.   Cardiovascular:      Rate and Rhythm: Normal rate and regular rhythm.   Pulmonary:      Effort: Pulmonary effort is normal.      Breath sounds: Normal breath sounds.   Abdominal:      General: Bowel sounds are normal. There is no distension.      Palpations: Abdomen is soft.      Tenderness: There is no abdominal tenderness.   Musculoskeletal:      Cervical back: Neck supple.      Right lower leg: No edema.      Left lower leg: No edema.   Skin:     General: Skin is warm and dry.      Nails: There is no clubbing.   Neurological:      Mental Status: She is alert. Mental status is at baseline.      Gait: Gait normal.   Psychiatric:         Behavior: Behavior normal.         Judgment: Judgment normal.         Assessment:     1. Annual physical exam    2. Essential hypertension    3. Hyperlipidemia, unspecified hyperlipidemia type    4. Hypothyroidism, unspecified type    5. Prediabetes    6. Chronic left-sided lumbar radiculopathy    7. Pneumococcal vaccination indicated    8. Osteopenia, unspecified location    9. Other specified disorders of bone density and structure, other site            Plan:         1. Annual physical exam  - immunizations reviewed, discussed  - mammo utd  - CRC screen utd  - dexa - due- ordered  - CBC Auto Differential; Future  - Comprehensive Metabolic Panel; Future  - Hemoglobin A1C; Future  - Lipid " Panel; Future  - TSH; Future    2. Essential hypertension  - CBC Auto Differential; Future  - Comprehensive Metabolic Panel; Future  - amLODIPine (NORVASC) 5 MG tablet; Take 1 tablet (5 mg total) by mouth once daily.  Dispense: 90 tablet; Refill: 3    3. Hyperlipidemia, unspecified hyperlipidemia type  - CBC Auto Differential; Future  - Comprehensive Metabolic Panel; Future  - Lipid Panel; Future  - atorvastatin (LIPITOR) 40 MG tablet; Take 1 tablet (40 mg total) by mouth once daily.  Dispense: 90 tablet; Refill: 3    4. Hypothyroidism, unspecified type  - CBC Auto Differential; Future  - TSH; Future  - levothyroxine (SYNTHROID) 112 MCG tablet; Take 1 tablet (112 mcg total) by mouth once daily.  Dispense: 90 tablet; Refill: 3    5. Prediabetes  - CBC Auto Differential; Future  - Comprehensive Metabolic Panel; Future  - Hemoglobin A1C; Future    6. Chronic left-sided lumbar radiculopathy  - was seeing [facility] clinic following MRI, however has not had improvement w/ treatment plan so would like to pursue other options for management. prefers to avoid surgery if possible, will refer to pain management for possible injection.  - Ambulatory referral/consult to Pain Clinic; Future  - CBC Auto Differential; Future    7. Pneumococcal vaccination indicated  - (In Office Administered) Pneumococcal Conjugate Vaccine (20 Valent) (IM)    8. Osteopenia, unspecified location  - DXA Bone Density Spine And Hip; Future    9. Other specified disorders of bone density and structure, other site   - DXA Bone Density Spine And Hip; Future            Unless there are intervening problems, Follow up in about 1 year (around 8/23/2023), or if symptoms worsen or fail to improve, for annual.      Patient note was created using MModal Dictation.  Any errors in syntax or even information may not have been identified and edited on initial review prior to signing this note.    40 minutes total time spent on the encounter, which includes face to face  time and non-face to face time preparing to see the patient (eg, review of tests), Obtaining and/or reviewing separately obtained history, Documenting clinical information in the electronic or other health record, Independently interpreting results (not separately reported) and communicating results to the patient/family/caregiver, or Care coordination (not separately reported).

## 2022-08-24 ENCOUNTER — APPOINTMENT (OUTPATIENT)
Dept: RADIOLOGY | Facility: CLINIC | Age: 67
End: 2022-08-24
Attending: INTERNAL MEDICINE
Payer: MEDICARE

## 2022-08-24 DIAGNOSIS — M85.88 OTHER SPECIFIED DISORDERS OF BONE DENSITY AND STRUCTURE, OTHER SITE: ICD-10-CM

## 2022-08-24 DIAGNOSIS — M85.80 OSTEOPENIA, UNSPECIFIED LOCATION: ICD-10-CM

## 2022-08-24 PROCEDURE — 77080 DXA BONE DENSITY AXIAL: CPT | Mod: 26,,, | Performed by: INTERNAL MEDICINE

## 2022-08-24 PROCEDURE — 77080 DXA BONE DENSITY AXIAL: CPT | Mod: TC,PO

## 2022-08-24 PROCEDURE — 77080 DEXA BONE DENSITY SPINE HIP: ICD-10-PCS | Mod: 26,,, | Performed by: INTERNAL MEDICINE

## 2022-08-25 ENCOUNTER — OFFICE VISIT (OUTPATIENT)
Dept: PAIN MEDICINE | Facility: CLINIC | Age: 67
End: 2022-08-25
Payer: MEDICARE

## 2022-08-25 VITALS
HEIGHT: 66 IN | RESPIRATION RATE: 18 BRPM | WEIGHT: 202.81 LBS | BODY MASS INDEX: 32.59 KG/M2 | SYSTOLIC BLOOD PRESSURE: 124 MMHG | HEART RATE: 67 BPM | DIASTOLIC BLOOD PRESSURE: 84 MMHG

## 2022-08-25 DIAGNOSIS — M76.32 ILIOTIBIAL BAND SYNDROME, LEFT: ICD-10-CM

## 2022-08-25 DIAGNOSIS — M53.3 SACROILIAC JOINT DYSFUNCTION OF LEFT SIDE: ICD-10-CM

## 2022-08-25 DIAGNOSIS — M54.50 CHRONIC LEFT-SIDED LOW BACK PAIN WITHOUT SCIATICA: ICD-10-CM

## 2022-08-25 DIAGNOSIS — G89.29 CHRONIC LEFT-SIDED LOW BACK PAIN WITHOUT SCIATICA: ICD-10-CM

## 2022-08-25 DIAGNOSIS — G57.12 MERALGIA PARESTHETICA OF LEFT SIDE: Primary | ICD-10-CM

## 2022-08-25 DIAGNOSIS — M67.952 TENDINOPATHY OF LEFT GLUTEAL REGION: ICD-10-CM

## 2022-08-25 PROCEDURE — 1101F PR PT FALLS ASSESS DOC 0-1 FALLS W/OUT INJ PAST YR: ICD-10-PCS | Mod: CPTII,S$GLB,, | Performed by: PHYSICAL MEDICINE & REHABILITATION

## 2022-08-25 PROCEDURE — 3288F PR FALLS RISK ASSESSMENT DOCUMENTED: ICD-10-PCS | Mod: CPTII,S$GLB,, | Performed by: PHYSICAL MEDICINE & REHABILITATION

## 2022-08-25 PROCEDURE — 1160F RVW MEDS BY RX/DR IN RCRD: CPT | Mod: CPTII,S$GLB,, | Performed by: PHYSICAL MEDICINE & REHABILITATION

## 2022-08-25 PROCEDURE — 1101F PT FALLS ASSESS-DOCD LE1/YR: CPT | Mod: CPTII,S$GLB,, | Performed by: PHYSICAL MEDICINE & REHABILITATION

## 2022-08-25 PROCEDURE — 3044F HG A1C LEVEL LT 7.0%: CPT | Mod: CPTII,S$GLB,, | Performed by: PHYSICAL MEDICINE & REHABILITATION

## 2022-08-25 PROCEDURE — 1160F PR REVIEW ALL MEDS BY PRESCRIBER/CLIN PHARMACIST DOCUMENTED: ICD-10-PCS | Mod: CPTII,S$GLB,, | Performed by: PHYSICAL MEDICINE & REHABILITATION

## 2022-08-25 PROCEDURE — 3079F PR MOST RECENT DIASTOLIC BLOOD PRESSURE 80-89 MM HG: ICD-10-PCS | Mod: CPTII,S$GLB,, | Performed by: PHYSICAL MEDICINE & REHABILITATION

## 2022-08-25 PROCEDURE — 99999 PR PBB SHADOW E&M-EST. PATIENT-LVL IV: ICD-10-PCS | Mod: PBBFAC,,, | Performed by: PHYSICAL MEDICINE & REHABILITATION

## 2022-08-25 PROCEDURE — 3288F FALL RISK ASSESSMENT DOCD: CPT | Mod: CPTII,S$GLB,, | Performed by: PHYSICAL MEDICINE & REHABILITATION

## 2022-08-25 PROCEDURE — 1159F PR MEDICATION LIST DOCUMENTED IN MEDICAL RECORD: ICD-10-PCS | Mod: CPTII,S$GLB,, | Performed by: PHYSICAL MEDICINE & REHABILITATION

## 2022-08-25 PROCEDURE — 99204 OFFICE O/P NEW MOD 45 MIN: CPT | Mod: S$GLB,,, | Performed by: PHYSICAL MEDICINE & REHABILITATION

## 2022-08-25 PROCEDURE — 3044F PR MOST RECENT HEMOGLOBIN A1C LEVEL <7.0%: ICD-10-PCS | Mod: CPTII,S$GLB,, | Performed by: PHYSICAL MEDICINE & REHABILITATION

## 2022-08-25 PROCEDURE — 99204 PR OFFICE/OUTPT VISIT, NEW, LEVL IV, 45-59 MIN: ICD-10-PCS | Mod: S$GLB,,, | Performed by: PHYSICAL MEDICINE & REHABILITATION

## 2022-08-25 PROCEDURE — 3074F SYST BP LT 130 MM HG: CPT | Mod: CPTII,S$GLB,, | Performed by: PHYSICAL MEDICINE & REHABILITATION

## 2022-08-25 PROCEDURE — 3074F PR MOST RECENT SYSTOLIC BLOOD PRESSURE < 130 MM HG: ICD-10-PCS | Mod: CPTII,S$GLB,, | Performed by: PHYSICAL MEDICINE & REHABILITATION

## 2022-08-25 PROCEDURE — 3008F PR BODY MASS INDEX (BMI) DOCUMENTED: ICD-10-PCS | Mod: CPTII,S$GLB,, | Performed by: PHYSICAL MEDICINE & REHABILITATION

## 2022-08-25 PROCEDURE — 3008F BODY MASS INDEX DOCD: CPT | Mod: CPTII,S$GLB,, | Performed by: PHYSICAL MEDICINE & REHABILITATION

## 2022-08-25 PROCEDURE — 1159F MED LIST DOCD IN RCRD: CPT | Mod: CPTII,S$GLB,, | Performed by: PHYSICAL MEDICINE & REHABILITATION

## 2022-08-25 PROCEDURE — 3079F DIAST BP 80-89 MM HG: CPT | Mod: CPTII,S$GLB,, | Performed by: PHYSICAL MEDICINE & REHABILITATION

## 2022-08-25 PROCEDURE — 99999 PR PBB SHADOW E&M-EST. PATIENT-LVL IV: CPT | Mod: PBBFAC,,, | Performed by: PHYSICAL MEDICINE & REHABILITATION

## 2022-08-25 NOTE — PROGRESS NOTES
[facility]  New Patient H&P    Referring Provider: Rachel Camacho Md  [address] [place] [address]    Chief Complaint:   Chief Complaint   Patient presents with    Back Pain       History of Present Illness: Ginger Marshall is a 67 y.o. female referred by Dr. Rachel Camacho for low back and left lower extremity pain. Lateral left leg pain started about 12 years ago as numbness but about a year ago it started becoming more painful with pins and needles and a burning sensation with activity. She was seeing a chiropractor who tried manipulations and a spinal decompression device which did not help. Wakes her up at night. Somewhat better with rest.     Onset: 12 years  Location: low back  Radiation: left leg, up to knee  Timing: constant  Quality: Aching, Dull, Burning, Tingling, Deep, Numb and Hot  Exacerbating Factors: exercise, lifting, lying down, standing for more than 10 minutes and walking for more than 20 minutes  Alleviating Factors: rest and sitting  Associated Symptoms: denies urinary incontinence, bowel incontinence, significant weight loss and significant motor weakness    Severity: Currently: 3/10   Typical Range: 3-8/10     Exacerbation: 8/10     Pain Disability Index  Family/Home Responsibilities:: 3  Recreation:: 3  Social Activity:: 3  Occupation:: 3  Sexual Behavior:: 3  Self Care:: 3  Life-Support Activities:: 5  Pain Disability Index (PDI): 23    Previous Interventions:  -None    Previous Therapies:  PT/OT: no   Relevant Surgery: no   Previous Medications:   - NSAIDS: Occasionally uses Ibuprofen, Tylenol which help a little  - Muscle Relaxants:    - TCAs:   - SNRIs:   - Topicals:   - Anticonvulsants:    - Opioids:     Current Pain Medications:  1. 1-2x/week BC Powder or Ibuprofen PM     Blood Thinners: None    Full Medication List:    Current Outpatient Medications:     amLODIPine (NORVASC) 5 MG tablet, Take 1 tablet (5 mg total) by mouth once daily., Disp: 90 tablet, Rfl: 3     "atorvastatin (LIPITOR) 40 MG tablet, Take 1 tablet (40 mg total) by mouth once daily., Disp: 90 tablet, Rfl: 3    levothyroxine (SYNTHROID) 112 MCG tablet, Take 1 tablet (112 mcg total) by mouth once daily., Disp: 90 tablet, Rfl: 3    multivitamin capsule, Take 1 capsule by mouth once daily., Disp: , Rfl:      Review of Systems:  ROS    Allergies:  Patient has no known allergies.     Medical History:   has a past medical history of Basal cell carcinoma and Osteopenia.    Surgical History:   has a past surgical history that includes LASIK; Tonsillectomy;  section; and Colonoscopy (N/A, 2022).    Family History:  family history includes Brain cancer in her mother; Breast cancer in some other family members; Stroke in her father.    Social History:   reports that she has never smoked. She has never used smokeless tobacco. She reports current alcohol use. She reports that she does not use drugs.    Physical Exam:  /84   Pulse 67   Resp 18   Ht 5' 6" (1.676 m)   Wt 92 kg (202 lb 13.2 oz)   LMP  (LMP Unknown)   BMI 32.74 kg/m²   GEN: No acute distress. Calm, comfortable  HENT: Normocephalic, atraumatic, moist mucous membranes  EYE: Anicteric sclera, non-injected.   CV: Non-diaphoretic. Regular Rate. Radial Pulses 2+.  RESP: Breathing comfortably. Chest expansion symmetric.  EXT: No clubbing, cyanosis.   SKIN: Warm, & dry to palpation. No visible rashes or lesions of exposed skin.   PSYCH: Pleasant mood and appropriate affect. Recent and remote memory intact.   GAIT: Independent, nonantalgic ambulation    Lumbar Spine Exam:       Inspection: No erythema, bruising. No surgical incisions      Palpation: (+) TTP of sacroiliac joint on left.       ROM: Not Limited in flexion, extension, lateral bending      (-) Facet loading bilaterally      (-) Straight Leg Raise bilaterally      (+) TYLOR on left      (+) Dior'sTest on left  Hip Exam:      Inspection: No gross deformity or apparent leg length " "discrepancy      Palpation: +TTP of left GTB.       ROM: Full internal rotation, external rotation without pain.       (-) FADIR bilaterally but with "tightness" and "stretching" on the left  Neurologic Exam:     Alert. Speech is fluent and appropriate.     Strength: 5/5 throughout bilateral lower extremities     Sensation: Abnormal to LT in LFCN distribution on the left, otherwise grossly intact to light touch in bilateral lower extremities     Reflexes: 2+ in b/l patella, achilles     Tone: No abnormality appreciated in bilateral lower extremities     No Clonus     Downgoing toes on plantar stimulation     (-) Urias bilaterally    Imaging:  - Pt has had MRI L-spine done at outside facility but will need to bring disc.     Labs:  BMP  Lab Results   Component Value Date     08/24/2022    K 4.2 08/24/2022     08/24/2022    CO2 26 08/24/2022    BUN 13 08/24/2022    CREATININE 0.8 08/24/2022    CALCIUM 9.3 08/24/2022    ANIONGAP 7 (L) 08/24/2022    ESTGFRAFRICA >60.0 01/07/2021    EGFRNONAA >60.0 01/07/2021     Lab Results   Component Value Date    ALT 19 08/24/2022    AST 17 08/24/2022    ALKPHOS 64 08/24/2022    BILITOT 0.4 08/24/2022     Lab Results   Component Value Date     08/24/2022       Assessment:  Ginger Marshall is a 67 y.o. female with the following diagnoses based on history, exam, and imaging:    Problem List Items Addressed This Visit        Neuro    Chronic left-sided lumbar radiculopathy      Other Visit Diagnoses     Meralgia paresthetica of left side    -  Primary    Sacroiliac joint dysfunction of left side        Relevant Orders    Ambulatory referral/consult to Physical/Occupational Therapy    Tendinopathy of left gluteal region        Relevant Orders    Ambulatory referral/consult to Physical/Occupational Therapy    Iliotibial band syndrome, left              This is a pleasant 67 y.o. lady presenting with:     - Left thigh numbness which appears consistent with Lateral Femoral " Cutaneous Neuropathy/Meralgia Paresthetica: a snapshot of her L-spine MRI which shows a potential herniated disc with nerve impingement but symptoms fit less with this diagnosis and we will need to see all the images.   - Left low back pain: SIJ pain on exam  - Left lateral thigh/hip pain with GTBursitis and ITB syndrome  - Comorbidities: HTN, HLD, Osteopenia, NORBERT. BMI >30.     Treatment Plan:   - PT/OT/HEP: Referral placed for PT to work on SIJ dysfunction, GTB, and IT band tightness. Discussed benefits of exercise for pain.   - Procedures: Will schedule her for an ultrasound guided left lat femoral cut nerve block for both diagnostic and therapeutic purposes.    - If no improvement with PT or nerve block would try left SIJ injection & Left GTB injection.  - Medications: No changes recommended at this time.   - Consider adding gabapentin in the future for neuropathic symptoms.   - Imaging: Reviewed. Pt to bring disc of L-spine MRI at next visit.   - Labs: Reviewed. Medications are appropriately dosed for current hepatorenal function.    Follow Up: RTC for scheduled above procedure.    Natacha Marcus MD PGY4  [facility]    The patient was seen and examined with the resident. I agree with the above documentation, and have edited as necessary.       Nelsy Camacho M.D.  Interventional Pain Medicine / Physical Medicine & Rehabilitation    Disclaimer: This note was partly generated using dictation software which may occasionally result in transcription errors.

## 2022-08-26 ENCOUNTER — CLINICAL SUPPORT (OUTPATIENT)
Dept: REHABILITATION | Facility: HOSPITAL | Age: 67
End: 2022-08-26
Payer: MEDICARE

## 2022-08-26 DIAGNOSIS — M53.3 SACROILIAC JOINT DYSFUNCTION OF LEFT SIDE: ICD-10-CM

## 2022-08-26 DIAGNOSIS — Z74.09 DECREASED FUNCTIONAL MOBILITY AND ENDURANCE: ICD-10-CM

## 2022-08-26 DIAGNOSIS — M67.952 TENDINOPATHY OF LEFT GLUTEAL REGION: ICD-10-CM

## 2022-08-26 PROCEDURE — 97161 PT EVAL LOW COMPLEX 20 MIN: CPT | Mod: PN | Performed by: PHYSICAL THERAPIST

## 2022-08-26 NOTE — PLAN OF CARE
[facility]  Physical Therapy Initial Evaluation    Date: 8/26/2022   Name: Ginger Marshall  [facility] Number: [id number]    Therapy Diagnosis:   Encounter Diagnoses   Name Primary?    Sacroiliac joint dysfunction of left side     Tendinopathy of left gluteal region     Decreased functional mobility and endurance        Physician: Natacha Marcus MD    Physician Orders: PT Eval and Treat     Please work on SIJ dysfunction, left GTB pain, IT band tightness and gluteal stretching/strengthening.     Medical Diagnosis from Referral: M53.3 (ICD-10-CM) - Sacroiliac joint dysfunction of left side M67.952 (ICD-10-CM) - Tendinopathy of left gluteal region     Evaluation Date: 8/26/2022  Authorization Period Expiration: 8/25/23  Plan of Care Expiration: 10/3/22  Progress Note Due: 10/3/22  Visit # / Visits authorized: 1/ 1  FOTO: 1/ 5   PTA: 0/5    Precautions: Standard    Time In: 10:40 am  Time Out: 11:30 am  Total Appointment Time (timed & untimed codes): 48 minutes (LCE)    SUBJECTIVE   Date of onset: chronic    History of current condition - Ginger reports: left lateral leg pain began about 12 yrs ago as numbness and progressed to tingling, pins and needles and burning about 1 yr ago. About 2 months ago, she went on vacation and walked more and noticed more pins and needles and burning sensation. She's seen a chiropractor and received traction and manipulations without relief (13 visits). Her left low back always hurts, especially with leaning to the right. Stretches help briefly, but pain quickly returns. Will schedule her for an ultrasound guided left lat femoral cut nerve block for both diagnostic and therapeutic purposes.     Falls: none    Imaging, MRI of lumbar spine not seen in epic (patient believes L3-5 are bulging)    Prior Therapy: chiropractor without relief  Social History: 2 story home   Occupation: retired  Hobbies: walking, electric bike riding, gardening  Prior Level of Function:  walk about 1 mile, takes breaks during gardening  Current Level of Function: wakes her up at night, increased symptoms with gardening (increased rest breaks every 10-15 min), overall don't move as much (walk as far, ride bike as much)  Worse Pain with flexion? yes  Bowel or bladder change? none      Pain:  Current 2/10, worst 8/10, best 2/10   Location: left back/glute  Constant or intermittent: constant  Description: Aching, Dull, Burning, Tingling, Deep, Numb and Hot  Aggravating Factors: bending forward/rotating right, gardening, walking  Easing Factors: rest and sitting    Patients goals: walk further with less pain and garden longer     Medical History:   Past Medical History:   Diagnosis Date    Basal cell carcinoma     Osteopenia        Surgical History:   Ginger Marshall  has a past surgical history that includes LASIK; Tonsillectomy;  section; and Colonoscopy (N/A, 2022).    Medications:   Ginger has a current medication list which includes the following prescription(s): amlodipine, atorvastatin, levothyroxine, and multivitamin.    Allergies:   Review of patient's allergies indicates:  No Known Allergies       OBJECTIVE       Palpation: right gluteus medius, greater trochanter, PSIS     Functional Movements:  Gait: WNL and good riki  Sit <> stand:  Squat: <25% knee flexion, mostly hip hinge and lumbar flexion      AROM:   Degrees Pain/Dysfunction   Flexion Fingertips to floor Repeated standing: NP   Extension 22 NP   Right Rotation 100% NP   Left Rotation 100% NP   Right Side Bending 45 cm Mild left LBP   Left Side Bending 47 cm NP     Strength:  RLE  LLE    Hip flexion: 5/5 Hip flexion: 5/5   Hip Abduction: 5/5 Hip abduction: 4-/5   Hip extension 4+/5 Hip extension 4-/5   Hip ER 4-/5 Hip ER 5/5   Hip IR 4/5 Hip IR 5/5   Knee flexion: 5/5 Knee flexion: 5/5   Knee extension: 5/5 Knee extension: 5/5     Special Tests:   Degrees Pain/Dysfunction   Hamstring 90/90 NT NP   Ely Test +  Tighter on left     Obers Test NT NP   SLR Test - NP   Slump Test Mild + inc'd left LBP, but no radic   Flexion Preference - NP   Extension Preference - NP   Piriformis test - NP   FABERs - NP   FADIR - NP   SIJ Distraction NT NP   SIJ Compression NT NP   Gaenslens NT NP   Sacral Thrust - NP   Thigh Thrust - NP   Sciatic nerve tension test - NP   Femoral nerve tension test NT NP       Sensation: decreased light touch to left lateral thigh to knee, but intact to deeper touch         CMS Impairment/Limitation/Restriction for FOTO Back Survey    NO FOTO COMPLETED TODAY; COMPLETE ON 1st F/U       TREATMENT     Total Treatment time (time-based codes) separate from Evaluation: 0 minutes (EVAL ONLY)    Ginger received therapeutic exercises to develop strength, endurance, ROM, flexibility, posture and core stabilization for 0 minutes including:  home exercise program (MET for left anterior innominate education, left hip abduction side lying, clamshells)    Next:  Hip abd straight leg raise  Modified side plank  Open books  Clamshells  Bridges  left Prone quad stretch  Hook lying hip abduction isometrics  transverse abdominis bracing  transverse abdominis bracing with ball squeeze    Consider dry needling gluteus medius    Ginger received the following manual therapy techniques: Joint mobilizations, soft tissue mobility and Myofacial release were applied to the: left hip/back for 0 minutes:      PATIENT EDUCATION AND HOME EXERCISES     Education provided:   - importance of home exercise program  - anatomy  - importance of body mechanics    Written Home Exercises Provided: yes. Exercises were reviewed and Ginger was able to demonstrate them prior to the end of the session.  Ginger demonstrated good  understanding of the education provided. See EMR under Patient Instructions for exercises provided during therapy sessions.    ASSESSMENT   Ginger is a 67 y.o. female referred to outpatient Physical Therapy with a medical diagnosis of left SIJ pain and gluteal  tendinopathy. Pt presents with left > right posterolateral hip weakness, tender to palpation of left gluteus medius, a left anterior innominate corrected with MET and decreased light touch sensation along her left lateral thigh (more of a chronic symptom) consistent with diagnosis of gluteal tendinopathy. Mild improvement in symptoms reported after MET. Her deficits limit her gardening and walking tolerance. She is appropriate for skilled PT to help her reach her goals.    Pt prognosis is Good.   Pt will benefit from skilled outpatient Physical Therapy to address the deficits stated above and in the chart below, provide pt/family education, and to maximize pt's level of independence.     Plan of care discussed with patient: Yes  Pt's spiritual, cultural and educational needs considered and pt agreeable to plan of care and goals as stated below:     Anticipated Barriers for therapy: none    Medical Necessity is demonstrated by the following  History  Co-morbidities and personal factors that may impact the plan of care Co-morbidities:   HTN, prediabetes    Personal Factors:   no deficits     moderate   Examination  Body Structures and Functions, activity limitations and participation restrictions that may impact the plan of care Body Regions:   back  lower extremities    Body Systems:    strength  gait  motor control  motor learning    Participation Restrictions:   sleep    Activity limitations:   Learning and applying knowledge  no deficits    General Tasks and Commands  no deficits    Communication  no deficits    Mobility  lifting and carrying objects  walking    Self care  no deficits    Domestic Life  shopping  cooking  doing house work (cleaning house, washing dishes, laundry)    Interactions/Relationships  no deficits    Life Areas  no deficits    Community and Social Life  community life  recreation and leisure         high     Clinical Presentation stable and uncomplicated low   Decision Making/ Complexity  Score: low     Goals:  Short Term Goals: 3 weeks:  1. Patient will demonstrate left hip abduction MMT at least 4+/5 for improved ADL performance  2. Patient will demonstrate (-) Slump test  3. Patient will report worst pain less than 4/10 in left buttock/leg for improved tolerance to ADL performance.    Long Term Goals: 6 weeks:  1. Patient will demonstrate proper squatting for to  objects without an increase in symptoms.  2. Patient will demonstrate appropriate body mechanics with laundry, vacuuming and sweeping.  3. Patient will improve FOTO to < TBD to improve ADL performance.  4. Patient will report symptoms back to her baseline.      PLAN   Plan of care Certification: 8/26/2022 to 10/3/22.    Outpatient Physical Therapy 2 times weekly for 6 weeks to include the following interventions: Cervical/Lumbar Traction, Electrical Stimulation TENS, IFC, NMES, Gait Training, Manual Therapy, Moist Heat/ Ice, Neuromuscular Re-ed, Patient Education, Self Care, Therapeutic Activities, Therapeutic Exercise and dry needling.     Marcell Chacon, PT      I CERTIFY THE NEED FOR THESE SERVICES FURNISHED UNDER THIS PLAN OF TREATMENT AND WHILE UNDER MY CARE   Physician's comments:     Physician's Signature: ___________________________________________________

## 2022-08-30 ENCOUNTER — CLINICAL SUPPORT (OUTPATIENT)
Dept: REHABILITATION | Facility: HOSPITAL | Age: 67
End: 2022-08-30
Payer: MEDICARE

## 2022-08-30 DIAGNOSIS — Z74.09 DECREASED FUNCTIONAL MOBILITY AND ENDURANCE: Primary | ICD-10-CM

## 2022-08-30 PROCEDURE — 97140 MANUAL THERAPY 1/> REGIONS: CPT | Mod: PN

## 2022-08-30 PROCEDURE — 97110 THERAPEUTIC EXERCISES: CPT | Mod: PN

## 2022-08-30 NOTE — PROGRESS NOTES
[facility]   Physical Therapy Treatment Note     Name: Ginger MCCARTNEY [facility] Number: [id number]    Therapy Diagnosis:   Encounter Diagnosis   Name Primary?    Decreased functional mobility and endurance Yes     Physician: No ref. provider found    Visit Date: 8/30/2022    Physician Orders: PT Eval and Treat:  Please work on SIJ dysfunction, left GTB pain, IT band tightness and gluteal stretching/strengthening.    Medical Diagnosis from Referral: M53.3 (ICD-10-CM) - Sacroiliac joint dysfunction of left side M67.952 (ICD-10-CM) - Tendinopathy of left gluteal region      Evaluation Date: 8/26/2022  Authorization Period Expiration: 8/25/23  Plan of Care Expiration: 10/3/22  Progress Note Due: 10/3/22  Visit # / Visits authorized: 2/1  FOTO: 2/5   PTA: 0/5     Precautions: Standard    Time In: 10:05 AM  Time Out: 11:00 AM  Total Billable Time: 55 minutes (3 TE, 1 MT)    SUBJECTIVE     Pt reports: exercises at home doing ok, and she feel most of her pain at her left upper butt area and left lower back area. Has increased left lateral thigh tingling and burning with prolonged standing and walking. Increased left sided low back pain with stooping and gardening.   She was compliant with home exercise program.  Response to previous treatment: 1st after  Functional change: 1st after    Pain: 4/10  Location: left buttock and low back region    OBJECTIVE     Objective Measures updated at progress report unless specified.     Treatment     Ginger received the treatments listed below:      manual therapy techniques: Joint mobilizations, Manual traction, Myofacial release, Soft tissue Mobilization, and Friction Massage were applied to the: lumbar spine for 10 minutes, including:  Left SIJ distraction manipulation  L5-S1 lumbar gapping next    therapeutic exercises to develop strength, endurance, ROM, flexibility, posture, and core stabilization for 45 minutes including: FOTO done  Hooklying TAs: 15x5''  holds  Bridges: 2x12 double leg   Sidelying clams: 10x10'' holds Bilateral  Open books: 8x10'' holds Bilateral   Prone quad stretch: 5x20'' holds with strap, left, pillow under stomach  Sidelying hip abduction: 2x12 left  Straight leg raise: 2x12 left, 1#  Prone on elbows: 6x10'' holds  Modified side plank: next    Leg press: next     Next:  Hook lying hip abduction isometrics  transverse abdominis bracing with ball squeeze    Patient Education and Home Exercises     Home Exercises Provided and Patient Education Provided   Education provided:   - importance of home exercise program  - anatomy  - importance of body mechanics    Written Home Exercises Provided: yes. Exercises were reviewed and Ginger was able to demonstrate them prior to the end of the session.  Ginger demonstrated good  understanding of the education provided. See EMR under Patient Instructions for exercises provided during therapy sessions    ASSESSMENT     Ginger is a 67 y.o. female referred to outpatient Physical Therapy with a medical diagnosis of left SIJ pain and gluteal tendinopathy. Pt presents with hypomobile left L3-L5 sideglides, positive Flick test on left, and pt reports past MRI imaging reported bulging discs in her lower back with some form for cord sac effacement. Pt reported decreased pain after prone SIJ manipulation, mild stiffness still present in left SIJ after, and pt reports doing left hip in 90-90 position with hip adduction to relieve her left sided low back discomfort. Left hip muscle fatigue present after session today.    Ginger Is progressing well towards her goals.   Pt prognosis is Good.     Pt will continue to benefit from skilled outpatient physical therapy to address the deficits listed in the problem list box on initial evaluation, provide pt/family education and to maximize pt's level of independence in the home and community environment.     Pt's spiritual, cultural and educational needs considered and pt agreeable to plan of  care and goals.     Anticipated barriers to physical therapy: None    Goals:   Short Term Goals: 3 weeks:  1. Patient will demonstrate left hip abduction MMT at least 4+/5 for improved ADL performance  2. Patient will demonstrate (-) Slump test.  3. Patient will report worst pain less than 4/10 in left buttock/leg for improved tolerance to ADL performance.     Long Term Goals: 6 weeks:  1. Patient will demonstrate proper squatting for to  objects without an increase in symptoms.  2. Patient will demonstrate appropriate body mechanics with laundry, vacuuming and sweeping.  3. Patient will improve FOTO to < TBD to improve ADL performance.  4. Patient will report symptoms back to her baseline.    PLAN     Cont per POC.     Eugene Shen, PT

## 2022-09-01 ENCOUNTER — CLINICAL SUPPORT (OUTPATIENT)
Dept: REHABILITATION | Facility: HOSPITAL | Age: 67
End: 2022-09-01
Payer: MEDICARE

## 2022-09-01 DIAGNOSIS — Z74.09 DECREASED FUNCTIONAL MOBILITY AND ENDURANCE: Primary | ICD-10-CM

## 2022-09-01 PROCEDURE — 97110 THERAPEUTIC EXERCISES: CPT | Mod: PN,CQ

## 2022-09-01 NOTE — PROGRESS NOTES
"[facility]   Physical Therapy Treatment Note     Name: Ginger MCCARTNEY [facility] Number: [id number]    Therapy Diagnosis:   Encounter Diagnosis   Name Primary?    Decreased functional mobility and endurance Yes     Physician: Natacha Marcus MD     Visit Date: 9/1/2022    Physician Orders: PT Eval and Treat:  Please work on SIJ dysfunction, left GTB pain, IT band tightness and gluteal stretching/strengthening.    Medical Diagnosis from Referral: M53.3 (ICD-10-CM) - Sacroiliac joint dysfunction of left side M67.952 (ICD-10-CM) - Tendinopathy of left gluteal region      Evaluation Date: 8/26/2022  Authorization Period Expiration: 8/25/23  Plan of Care Expiration: 10/3/22  Progress Note Due: 10/3/22  Visit # / Visits authorized: 3/ TBD  FOTO: 3/5   PTA: 1/5     Precautions: Standard    Time In: 12:00 AM  Time Out: 12:56 AM  Total Billable Time: 56 minutes (4 TE)    SUBJECTIVE     Pt reports: her pain is a little better today overall.   She was compliant with home exercise program.  Response to previous treatment: decreased pain.  Functional change: not at this time.    Pain: 3/10  Location: left buttock and low back region    OBJECTIVE     Objective Measures updated at progress report unless specified.     Treatment     Ginger received the treatments listed below:      manual therapy techniques: Joint mobilizations, Manual traction, Myofacial release, Soft tissue Mobilization, and Friction Massage were applied to the: lumbar spine for 00 minutes, including:  Left SIJ distraction manipulation  L5-S1 lumbar gapping next    therapeutic exercises to develop strength, endurance, ROM, flexibility, posture, and core stabilization for 56 minutes including:     Hooklying TAs: 15x5'' holds  Bridges with TA: 2x15 double leg   Straight leg raise: 2x15 left, 1#   Hook lying hip abduction isometrics: 5"x2 minutes   Sidelying clams: 10x10'' holds Bilateral  Open books: 10x10'' holds Bilateral   Sidelying hip " "abduction: 2x12 left  Modified side plank: 5x5"    Prone on elbows: 6x10'' holds  Prone quad stretch: 5x20'' holds with strap, left, pillow under stomach    Leg press: 4.5 plates 2x10 sled 4       Next:  transverse abdominis bracing with ball squeeze    Patient Education and Home Exercises     Home Exercises Provided and Patient Education Provided   Education provided:   - importance of home exercise program  - anatomy  - importance of body mechanics    Written Home Exercises Provided: yes. Exercises were reviewed and Ginger was able to demonstrate them prior to the end of the session.  Ginger demonstrated good  understanding of the education provided. See EMR under Patient Instructions for exercises provided during therapy sessions    ASSESSMENT     Ginger is a 67 y.o. female referred to outpatient Physical Therapy with a medical diagnosis of left SIJ pain and gluteal tendinopathy. Pt presents with hypomobile left L3-L5 sideglides, positive Flick test on left, and pt reports past MRI imaging reported bulging discs in her lower back with some form for cord sac effacement. Pt reported having mild back pain when performing bridges, instructed patient to perform TA with exercise and was able to perform with no reports of pain. Patient had no difficulty with new exercises added along with today's progressions noted in bold.  Patient did not receive manual therapy today due to time needed for new exercises added.    Ginger Is progressing well towards her goals.   Pt prognosis is Good.     Pt will continue to benefit from skilled outpatient physical therapy to address the deficits listed in the problem list box on initial evaluation, provide pt/family education and to maximize pt's level of independence in the home and community environment.     Pt's spiritual, cultural and educational needs considered and pt agreeable to plan of care and goals.     Anticipated barriers to physical therapy: None    Goals:   Short Term Goals: 3 " weeks:  1. Patient will demonstrate left hip abduction MMT at least 4+/5 for improved ADL performance  2. Patient will demonstrate (-) Slump test.  3. Patient will report worst pain less than 4/10 in left buttock/leg for improved tolerance to ADL performance.     Long Term Goals: 6 weeks:  1. Patient will demonstrate proper squatting for to  objects without an increase in symptoms.  2. Patient will demonstrate appropriate body mechanics with laundry, vacuuming and sweeping.  3. Patient will improve FOTO to < TBD to improve ADL performance.  4. Patient will report symptoms back to her baseline.    PLAN     Cont per POC.     Henrik Etienne, PTA

## 2022-09-07 ENCOUNTER — CLINICAL SUPPORT (OUTPATIENT)
Dept: REHABILITATION | Facility: HOSPITAL | Age: 67
End: 2022-09-07
Payer: MEDICARE

## 2022-09-07 DIAGNOSIS — Z74.09 DECREASED FUNCTIONAL MOBILITY AND ENDURANCE: Primary | ICD-10-CM

## 2022-09-07 PROCEDURE — 97110 THERAPEUTIC EXERCISES: CPT | Mod: PN

## 2022-09-07 NOTE — PROGRESS NOTES
[facility]   Physical Therapy Treatment Note     Name: Ginger MCCARTNEY [facility] Number: [id number]    Therapy Diagnosis:   Encounter Diagnosis   Name Primary?    Decreased functional mobility and endurance Yes     Physician: Natacha Marcus MD     Visit Date: 9/7/2022    Physician Orders: PT Eval and Treat:  Please work on SIJ dysfunction, left GTB pain, IT band tightness and gluteal stretching/strengthening.    Medical Diagnosis from Referral: M53.3 (ICD-10-CM) - Sacroiliac joint dysfunction of left side M67.952 (ICD-10-CM) - Tendinopathy of left gluteal region      Evaluation Date: 8/26/2022  Authorization Period Expiration: 8/25/23  Plan of Care Expiration: 10/3/22  Progress Note Due: 10/3/22  Visit # / Visits authorized: 3/TBD  FOTO: 4/5   PTA: 0/5     Precautions: Standard    Time In: 10:05 AM  Time Out: 11:00 AM  Total Billable Time: 55 minutes (4 TE)    SUBJECTIVE     Pt reports: is feeling ok and the left side of her low back and upper buttock is feeling a little achy right now though not bad. Pt does still have her usual numbness at the lateral side of her left thigh.  She was compliant with home exercise program.  Response to previous treatment: decreased pain.  Functional change: not at this time.    Pain: 3/10  Location: left buttock and low back region    OBJECTIVE     Objective Measures updated at progress report unless specified.     Treatment     Ginger received the treatments listed below:      manual therapy techniques: Joint mobilizations, Manual traction, Myofacial release, Soft tissue Mobilization, and Friction Massage were applied to the: lumbar spine for 15 minutes, including:  Left SIJ distraction manipulation  L4-S1 lumbar gapping  Left hip long axis traction     therapeutic exercises to develop strength, endurance, ROM, flexibility, posture, and core stabilization for 40 minutes including:     Hooklying TAs: 15x5'' holds  Bridges with TA: 2x15 double leg   Straight  "leg raise: 2x15 left, 1#   Hook lying hip abduction isometrics: 5"x2 minutes   Sidelying clams: 10x10'' holds Bilateral  Open books: 10x10'' holds Bilateral   Sidelying hip abduction: 2x12 left  Modified side plank: 5x5"    Prone on elbows: 6x10'' holds  Prone quad stretch: 5x20'' holds with strap, left, pillow under stomach    Leg press: 5.5 plates 2x20 sled 4     Next:  transverse abdominis bracing with ball squeeze    Patient Education and Home Exercises     Home Exercises Provided and Patient Education Provided   Education provided:   - importance of home exercise program  - anatomy  - importance of body mechanics    Written Home Exercises Provided: yes. Exercises were reviewed and Ginger was able to demonstrate them prior to the end of the session.  Ginger demonstrated good  understanding of the education provided. See EMR under Patient Instructions for exercises provided during therapy sessions    ASSESSMENT     Ginger is a 67 y.o. female referred to outpatient Physical Therapy with a medical diagnosis of left SIJ pain and gluteal tendinopathy. Pt presents with left posteriorly rotated innominate, hypomobile left SIJ, and hypomobile left L4 and L5 with lumbar rocking. Added only heel rocking with left hip bias (right trunk sidebend), and pt reported decreased pain after manual activities. Mild low back pain No change in left thigh numbness, and still some aching in low back at end of session. Past DEXA scan possibly show mild lumbo-thoracic mile curve and L5-S1 arthritis with disc narrowing present. Pt getting a future injection for her low back. Some pain with bridges that were partially relieved with posterior pelvic tilt.     Ginger Is progressing well towards her goals.   Pt prognosis is Good.     Pt will continue to benefit from skilled outpatient physical therapy to address the deficits listed in the problem list box on initial evaluation, provide pt/family education and to maximize pt's level of independence in the " home and community environment.     Pt's spiritual, cultural and educational needs considered and pt agreeable to plan of care and goals.     Anticipated barriers to physical therapy: None    Goals:   Short Term Goals: 3 weeks:  1. Patient will demonstrate left hip abduction MMT at least 4+/5 for improved ADL performance  2. Patient will demonstrate (-) Slump test.  3. Patient will report worst pain less than 4/10 in left buttock/leg for improved tolerance to ADL performance.     Long Term Goals: 6 weeks:  1. Patient will demonstrate proper squatting for to  objects without an increase in symptoms.  2. Patient will demonstrate appropriate body mechanics with laundry, vacuuming and sweeping.  3. Patient will improve FOTO to < TBD to improve ADL performance.  4. Patient will report symptoms back to her baseline.    PLAN     Cont per POC.     Eugene Shen, PT

## 2022-09-07 NOTE — PROGRESS NOTES
[facility]    Chief Complaint:   Chief Complaint   Patient presents with    Low-back Pain         History of Present Illness: Ginger Marshall is a 67 y.o. female referred by Dr. Rachel Camacho for low back and left lower extremity pain. Lateral left leg pain started about 12 years ago as numbness but about a year ago it started becoming more painful with pins and needles and a burning sensation with activity. She was seeing a chiropractor who tried manipulations and a spinal decompression device which did not help. Wakes her up at night. Somewhat better with rest.     Onset: 12 years  Location: low back  Radiation: left leg, up to knee  Timing: constant  Quality: Aching, Dull, Burning, Tingling, Deep, Numb and Hot  Exacerbating Factors: exercise, lifting, lying down, standing for more than 10 minutes and walking for more than 20 minutes  Alleviating Factors: rest and sitting  Associated Symptoms: denies urinary incontinence, bowel incontinence, significant weight loss and significant motor weakness    Severity: Currently: 3/10   Typical Range: 3-8/10     Exacerbation: 8/10     Interval History (09/07/2022):  Ginger Marshall returns today for follow up.  At the last clinic visit, referred to PT and scheduled LFCN block.    She is in PT and this is going well.     Currently, the back and lateral hip pain is stable.  She still has left leg numbness. She denies any significant changes since previous visit.       Current Pain Scales:  Current: 4/10              Typical Range: 3-8/10     Pain Disability Index  Family/Home Responsibilities:: 3  Recreation:: 4  Social Activity:: 4  Occupation:: 3  Sexual Behavior:: 3  Self Care:: 2  Life-Support Activities:: 1  Pain Disability Index (PDI): 20    Previous Interventions:  -None    Previous Therapies:  PT/OT: yes   Relevant Surgery: no   Previous Medications:   - NSAIDS: Occasionally uses Ibuprofen, Tylenol which help a little  - Muscle Relaxants:    - TCAs:   - SNRIs:   -  "Topicals:   - Anticonvulsants:    - Opioids:     Current Pain Medications:  1-2x/week BC Powder or Ibuprofen PM     Blood Thinners: None    Full Medication List:    Current Outpatient Medications:     amLODIPine (NORVASC) 5 MG tablet, Take 1 tablet (5 mg total) by mouth once daily., Disp: 90 tablet, Rfl: 3    atorvastatin (LIPITOR) 40 MG tablet, Take 1 tablet (40 mg total) by mouth once daily., Disp: 90 tablet, Rfl: 3    levothyroxine (SYNTHROID) 112 MCG tablet, Take 1 tablet (112 mcg total) by mouth once daily., Disp: 90 tablet, Rfl: 3    multivitamin capsule, Take 1 capsule by mouth once daily., Disp: , Rfl:      Review of Systems:  ROS    Allergies:  Patient has no known allergies.     Medical History:   has a past medical history of Basal cell carcinoma and Osteopenia.    Surgical History:   has a past surgical history that includes LASIK; Tonsillectomy;  section; and Colonoscopy (N/A, 2022).    Family History:  family history includes Brain cancer in her mother; Breast cancer in some other family members; Stroke in her father.    Social History:   reports that she has never smoked. She has never used smokeless tobacco. She reports current alcohol use. She reports that she does not use drugs.    Physical Exam:  /76   Pulse 87   Resp 16   Ht 5' 6" (1.676 m)   Wt 90.9 kg (200 lb 6.4 oz)   LMP  (LMP Unknown)   BMI 32.35 kg/m²   GEN: No acute distress. Calm, comfortable  HENT: Normocephalic, atraumatic, moist mucous membranes  EYE: Anicteric sclera, non-injected.   CV: Non-diaphoretic. Regular Rate. Radial Pulses 2+.  RESP: Breathing comfortably. Chest expansion symmetric.  EXT: No clubbing, cyanosis.   SKIN: Warm, & dry to palpation. No visible rashes or lesions of exposed skin.   PSYCH: Pleasant mood and appropriate affect. Recent and remote memory intact.   GAIT: Independent, nonantalgic ambulation    Lumbar Spine Exam:       Inspection: No erythema, bruising. No surgical incisions      " "Palpation: (+) TTP of sacroiliac joint on left.       ROM: Not Limited in flexion, extension, lateral bending      (-) Facet loading bilaterally      (-) Straight Leg Raise bilaterally      (+) TYLOR on left      (+) Dior'sTest on left  Hip Exam:      Inspection: No gross deformity or apparent leg length discrepancy      Palpation: +TTP of left GTB.       ROM: Full internal rotation, external rotation without pain.       (-) FADIR bilaterally but with "tightness" and "stretching" on the left  Neurologic Exam:     Alert. Speech is fluent and appropriate.     Strength: 5/5 throughout bilateral lower extremities     Sensation: Abnormal to LT in LFCN distribution on the left, otherwise grossly intact to light touch in bilateral lower extremities     Reflexes: 2+ in b/l patella, achilles     Tone: No abnormality appreciated in bilateral lower extremities     No Clonus     Downgoing toes on plantar stimulation     (-) Urias bilaterally    Imaging:  - Pt has had MRI L-spine done at outside facility but will need to bring disc.     Labs:  BMP  Lab Results   Component Value Date     08/24/2022    K 4.2 08/24/2022     08/24/2022    CO2 26 08/24/2022    BUN 13 08/24/2022    CREATININE 0.8 08/24/2022    CALCIUM 9.3 08/24/2022    ANIONGAP 7 (L) 08/24/2022    ESTGFRAFRICA >60.0 01/07/2021    EGFRNONAA >60.0 01/07/2021     Lab Results   Component Value Date    ALT 19 08/24/2022    AST 17 08/24/2022    ALKPHOS 64 08/24/2022    BILITOT 0.4 08/24/2022     Lab Results   Component Value Date     08/24/2022       Assessment:  Ginger Marshall is a 67 y.o. female with the following diagnoses based on history, exam, and imaging:    Problem List Items Addressed This Visit    None  Visit Diagnoses       Meralgia paresthetica of left side    -  Primary    Relevant Orders    Nerve Block    Chronic left-sided low back pain without sciatica        Sacroiliac joint dysfunction of left side        Iliotibial band syndrome, left    "     Tendinopathy of left gluteal region                This is a pleasant 67 y.o. lady presenting with:     - Left thigh numbness which appears consistent with Lateral Femoral Cutaneous Neuropathy/Meralgia Paresthetica: a snapshot of her L-spine MRI which shows a potential herniated disc with nerve impingement but symptoms fit less with this diagnosis and we will need to see all the images.   - Left low back pain: SIJ pain on exam  - Left lateral thigh/hip pain with GTBursitis and ITB syndrome  - Comorbidities: HTN, HLD, Osteopenia, NORBERT. BMI >30.     Treatment Plan:   - PT/OT/HEP: Cont PT to work on SIJ dysfunction, GTB, and IT band tightness. Discussed benefits of exercise for pain.   - Procedures: Performed ultrasound guided left lat femoral cut nerve block today for both diagnostic and therapeutic purposes.    - If no improvement with PT or nerve block would try left SIJ injection & Left GTB injection.  - Medications: No changes recommended at this time.   - Consider adding gabapentin in the future for neuropathic symptoms.   - Imaging: Reviewed. Pt to bring disc of L-spine MRI at next visit.   - Labs: Reviewed. Medications are appropriately dosed for current hepatorenal function.    Follow Up: RTC in 4 weeks to assess PT and LFCN block      Nelsy Camacho M.D.  Interventional Pain Medicine / Physical Medicine & Rehabilitation    Disclaimer: This note was partly generated using dictation software which may occasionally result in transcription errors.

## 2022-09-08 ENCOUNTER — OFFICE VISIT (OUTPATIENT)
Dept: PAIN MEDICINE | Facility: CLINIC | Age: 67
End: 2022-09-08
Payer: MEDICARE

## 2022-09-08 VITALS
DIASTOLIC BLOOD PRESSURE: 76 MMHG | RESPIRATION RATE: 16 BRPM | BODY MASS INDEX: 32.2 KG/M2 | HEIGHT: 66 IN | SYSTOLIC BLOOD PRESSURE: 109 MMHG | HEART RATE: 87 BPM | WEIGHT: 200.38 LBS

## 2022-09-08 DIAGNOSIS — M67.952 TENDINOPATHY OF LEFT GLUTEAL REGION: ICD-10-CM

## 2022-09-08 DIAGNOSIS — G57.12 MERALGIA PARESTHETICA OF LEFT SIDE: Primary | ICD-10-CM

## 2022-09-08 DIAGNOSIS — M54.50 CHRONIC LEFT-SIDED LOW BACK PAIN WITHOUT SCIATICA: ICD-10-CM

## 2022-09-08 DIAGNOSIS — G89.29 CHRONIC LEFT-SIDED LOW BACK PAIN WITHOUT SCIATICA: ICD-10-CM

## 2022-09-08 DIAGNOSIS — M53.3 SACROILIAC JOINT DYSFUNCTION OF LEFT SIDE: ICD-10-CM

## 2022-09-08 DIAGNOSIS — M76.32 ILIOTIBIAL BAND SYNDROME, LEFT: ICD-10-CM

## 2022-09-08 PROCEDURE — 99999 PR PBB SHADOW E&M-EST. PATIENT-LVL III: ICD-10-PCS | Mod: PBBFAC,,, | Performed by: PHYSICAL MEDICINE & REHABILITATION

## 2022-09-08 PROCEDURE — 64450 PR NERVE BLOCK INJ, ANES/STEROID, OTHER PERIPHERAL: ICD-10-PCS | Mod: LT,S$GLB,, | Performed by: PHYSICAL MEDICINE & REHABILITATION

## 2022-09-08 PROCEDURE — 99213 PR OFFICE/OUTPT VISIT, EST, LEVL III, 20-29 MIN: ICD-10-PCS | Mod: 25,S$GLB,, | Performed by: PHYSICAL MEDICINE & REHABILITATION

## 2022-09-08 PROCEDURE — 76942 PR U/S GUIDANCE FOR NEEDLE GUIDANCE: ICD-10-PCS | Mod: S$GLB,,, | Performed by: PHYSICAL MEDICINE & REHABILITATION

## 2022-09-08 PROCEDURE — 3008F BODY MASS INDEX DOCD: CPT | Mod: CPTII,S$GLB,, | Performed by: PHYSICAL MEDICINE & REHABILITATION

## 2022-09-08 PROCEDURE — 1160F RVW MEDS BY RX/DR IN RCRD: CPT | Mod: CPTII,S$GLB,, | Performed by: PHYSICAL MEDICINE & REHABILITATION

## 2022-09-08 PROCEDURE — 99213 OFFICE O/P EST LOW 20 MIN: CPT | Mod: 25,S$GLB,, | Performed by: PHYSICAL MEDICINE & REHABILITATION

## 2022-09-08 PROCEDURE — 3008F PR BODY MASS INDEX (BMI) DOCUMENTED: ICD-10-PCS | Mod: CPTII,S$GLB,, | Performed by: PHYSICAL MEDICINE & REHABILITATION

## 2022-09-08 PROCEDURE — 1101F PT FALLS ASSESS-DOCD LE1/YR: CPT | Mod: CPTII,S$GLB,, | Performed by: PHYSICAL MEDICINE & REHABILITATION

## 2022-09-08 PROCEDURE — 3078F PR MOST RECENT DIASTOLIC BLOOD PRESSURE < 80 MM HG: ICD-10-PCS | Mod: CPTII,S$GLB,, | Performed by: PHYSICAL MEDICINE & REHABILITATION

## 2022-09-08 PROCEDURE — 3288F PR FALLS RISK ASSESSMENT DOCUMENTED: ICD-10-PCS | Mod: CPTII,S$GLB,, | Performed by: PHYSICAL MEDICINE & REHABILITATION

## 2022-09-08 PROCEDURE — 1160F PR REVIEW ALL MEDS BY PRESCRIBER/CLIN PHARMACIST DOCUMENTED: ICD-10-PCS | Mod: CPTII,S$GLB,, | Performed by: PHYSICAL MEDICINE & REHABILITATION

## 2022-09-08 PROCEDURE — 1101F PR PT FALLS ASSESS DOC 0-1 FALLS W/OUT INJ PAST YR: ICD-10-PCS | Mod: CPTII,S$GLB,, | Performed by: PHYSICAL MEDICINE & REHABILITATION

## 2022-09-08 PROCEDURE — 64450 NJX AA&/STRD OTHER PN/BRANCH: CPT | Mod: LT,S$GLB,, | Performed by: PHYSICAL MEDICINE & REHABILITATION

## 2022-09-08 PROCEDURE — 99999 PR PBB SHADOW E&M-EST. PATIENT-LVL III: CPT | Mod: PBBFAC,,, | Performed by: PHYSICAL MEDICINE & REHABILITATION

## 2022-09-08 PROCEDURE — 3288F FALL RISK ASSESSMENT DOCD: CPT | Mod: CPTII,S$GLB,, | Performed by: PHYSICAL MEDICINE & REHABILITATION

## 2022-09-08 PROCEDURE — 3074F PR MOST RECENT SYSTOLIC BLOOD PRESSURE < 130 MM HG: ICD-10-PCS | Mod: CPTII,S$GLB,, | Performed by: PHYSICAL MEDICINE & REHABILITATION

## 2022-09-08 PROCEDURE — 3074F SYST BP LT 130 MM HG: CPT | Mod: CPTII,S$GLB,, | Performed by: PHYSICAL MEDICINE & REHABILITATION

## 2022-09-08 PROCEDURE — 3078F DIAST BP <80 MM HG: CPT | Mod: CPTII,S$GLB,, | Performed by: PHYSICAL MEDICINE & REHABILITATION

## 2022-09-08 PROCEDURE — 76942 ECHO GUIDE FOR BIOPSY: CPT | Mod: S$GLB,,, | Performed by: PHYSICAL MEDICINE & REHABILITATION

## 2022-09-08 PROCEDURE — 3044F HG A1C LEVEL LT 7.0%: CPT | Mod: CPTII,S$GLB,, | Performed by: PHYSICAL MEDICINE & REHABILITATION

## 2022-09-08 PROCEDURE — 1159F MED LIST DOCD IN RCRD: CPT | Mod: CPTII,S$GLB,, | Performed by: PHYSICAL MEDICINE & REHABILITATION

## 2022-09-08 PROCEDURE — 3044F PR MOST RECENT HEMOGLOBIN A1C LEVEL <7.0%: ICD-10-PCS | Mod: CPTII,S$GLB,, | Performed by: PHYSICAL MEDICINE & REHABILITATION

## 2022-09-08 PROCEDURE — 1159F PR MEDICATION LIST DOCUMENTED IN MEDICAL RECORD: ICD-10-PCS | Mod: CPTII,S$GLB,, | Performed by: PHYSICAL MEDICINE & REHABILITATION

## 2022-09-08 NOTE — PROCEDURES
"Nerve Block    Date/Time: 9/8/2022 3:30 PM  Performed by: Nelsy Camacho MD  Authorized by: Nelsy Camacho MD   Consent Done: Yes  Time out: Immediately prior to procedure a "time out" was called to verify the correct patient, procedure, equipment, support staff and site/side marked as required.  Indications: pain relief  Body area: lower extremity (Lateral Femoral Cutaneous Nerve Block)  Nerve: femoral  Laterality: left    Patient sedated: no  Medications administered: DepoMedrol 40 mg injection  Preparation: Patient was prepped and draped in the usual sterile fashion.  Patient position: supine  Needle size: 22 G  Location technique: ultrasound guidance  Local Anesthetic: lidocaine 1% without epinephrine  Anesthetic total: 5 mL  Outcome: pain improved  Patient tolerance: Patient tolerated the procedure well with no immediate complications      "

## 2022-09-14 ENCOUNTER — CLINICAL SUPPORT (OUTPATIENT)
Dept: REHABILITATION | Facility: HOSPITAL | Age: 67
End: 2022-09-14
Payer: MEDICARE

## 2022-09-14 DIAGNOSIS — Z74.09 DECREASED FUNCTIONAL MOBILITY AND ENDURANCE: Primary | ICD-10-CM

## 2022-09-14 PROCEDURE — 97110 THERAPEUTIC EXERCISES: CPT | Mod: PN

## 2022-09-14 PROCEDURE — 97140 MANUAL THERAPY 1/> REGIONS: CPT | Mod: PN

## 2022-09-14 NOTE — PROGRESS NOTES
"[facility]   Physical Therapy Treatment Note     Name: Ginger MCCARTNEY [facility] Number: [id number]    Therapy Diagnosis:   Encounter Diagnosis   Name Primary?    Decreased functional mobility and endurance Yes     Physician: Natacha Marcus MD     Visit Date: 9/14/2022    Physician Orders: PT Eval and Treat:  Please work on SIJ dysfunction, left GTB pain, IT band tightness and gluteal stretching/strengthening.    Medical Diagnosis from Referral: M53.3 (ICD-10-CM) - Sacroiliac joint dysfunction of left side M67.952 (ICD-10-CM) - Tendinopathy of left gluteal region      Evaluation Date: 8/26/2022  Authorization Period Expiration: 8/25/23  Plan of Care Expiration: 10/3/22  Progress Note Due: 10/3/22  Visit # / Visits authorized: 4/TBD  FOTO: 4/5   PTA: 0/5     Precautions: Standard    Time In: 2:05 PM  Time Out: 3:00 PM  Total Billable Time: 55 minutes (3TE, 1 MT)    SUBJECTIVE     Pt reports: is doing ok and no change in left lateral thigh tingling. Low level of aching in left side of low back.  She was compliant with home exercise program.  Response to previous treatment: decreased pain.  Functional change: not at this time.    Pain: 2/10  Location: left buttock and low back region    OBJECTIVE     Objective Measures updated at progress report unless specified.     Treatment     Ginger received the treatments listed below:      manual therapy techniques: Joint mobilizations, Manual traction, Myofacial release, Soft tissue Mobilization, and Friction Massage were applied to the: lumbar spine for 15 minutes, including:  Left SIJ distraction manipulation  L4-S1 lumbar gapping  Left hip long axis traction     therapeutic exercises to develop strength, endurance, ROM, flexibility, posture, and core stabilization for 40 minutes including:     Hooklying TAs: 15x5'' holds  Bridges with TA: 2x15 double leg   Straight leg raise: 2x15 left, 1#   Hook lying hip abduction isometrics: 5"x2 minutes   Sidelying " "clams: 10x10'' holds Bilateral  Open books: 10x10'' holds Bilateral   Sidelying hip abduction: 2x12 left  Modified side plank: 10x5"    Prone on elbows: 6x10'' holds  Prone quad stretch: 5x20'' holds with strap, left, pillow under stomach    Leg press: 5.5 plates 2x20 sled 4     Next:  transverse abdominis bracing with ball squeeze    Patient Education and Home Exercises     Home Exercises Provided and Patient Education Provided   Education provided:   - importance of home exercise program  - anatomy  - importance of body mechanics    Written Home Exercises Provided: yes. Exercises were reviewed and Ginger was able to demonstrate them prior to the end of the session.  Ginger demonstrated good  understanding of the education provided. See EMR under Patient Instructions for exercises provided during therapy sessions    ASSESSMENT     Ginger is a 67 y.o. female referred to outpatient Physical Therapy with a medical diagnosis of left SIJ pain and gluteal tendinopathy. Past DEXA scan possibly show mild lumbo-thoracic mile curve and L5-S1 arthritis with disc narrowing present. Pt low grade low back pain fairly static, but patient reports feeling good after session.     Ginger Is progressing well towards her goals.   Pt prognosis is Good.     Pt will continue to benefit from skilled outpatient physical therapy to address the deficits listed in the problem list box on initial evaluation, provide pt/family education and to maximize pt's level of independence in the home and community environment.     Pt's spiritual, cultural and educational needs considered and pt agreeable to plan of care and goals.     Anticipated barriers to physical therapy: None    Goals:   Short Term Goals: 3 weeks:  1. Patient will demonstrate left hip abduction MMT at least 4+/5 for improved ADL performance  2. Patient will demonstrate (-) Slump test.  3. Patient will report worst pain less than 4/10 in left buttock/leg for improved tolerance to ADL " performance.     Long Term Goals: 6 weeks:  1. Patient will demonstrate proper squatting for to  objects without an increase in symptoms.  2. Patient will demonstrate appropriate body mechanics with laundry, vacuuming and sweeping.  3. Patient will improve FOTO to < TBD to improve ADL performance.  4. Patient will report symptoms back to her baseline.    PLAN     Cont per POC.     Eugene Shen, PT

## 2022-09-16 ENCOUNTER — CLINICAL SUPPORT (OUTPATIENT)
Dept: REHABILITATION | Facility: HOSPITAL | Age: 67
End: 2022-09-16
Payer: MEDICARE

## 2022-09-16 DIAGNOSIS — Z74.09 DECREASED FUNCTIONAL MOBILITY AND ENDURANCE: Primary | ICD-10-CM

## 2022-09-16 PROCEDURE — 97110 THERAPEUTIC EXERCISES: CPT | Mod: PN,CQ

## 2022-09-16 NOTE — PROGRESS NOTES
"[facility]   Physical Therapy Treatment Note     Name: Ginger MCCARTNEY [facility] Number: [id number]    Therapy Diagnosis:   Encounter Diagnosis   Name Primary?    Decreased functional mobility and endurance Yes     Physician: Natacha Marcus MD     Visit Date: 9/16/2022    Physician Orders: PT Eval and Treat:  Please work on SIJ dysfunction, left GTB pain, IT band tightness and gluteal stretching/strengthening.    Medical Diagnosis from Referral: M53.3 (ICD-10-CM) - Sacroiliac joint dysfunction of left side M67.952 (ICD-10-CM) - Tendinopathy of left gluteal region      Evaluation Date: 8/26/2022  Authorization Period Expiration: 8/25/23  Plan of Care Expiration: 10/3/22  Progress Note Due: 10/3/22  Visit # / Visits authorized: 5/20  FOTO: 5/5   PTA: 1/5     Precautions: Standard    Time In: 2:00 PM  Time Out: 2:54 PM  Total Billable Time: 54 minutes (4 TE)    SUBJECTIVE     Pt reports: her pain level is about the same and continues with her usual numbness at the lateral side of her left thigh.  She was compliant with home exercise program.  Response to previous treatment: decreased pain.  Functional change: not at this time.    Pain: 2/10  Location: left buttock and low back region    OBJECTIVE     Objective Measures updated at progress report unless specified.     Treatment     Ginger received the treatments listed below:      manual therapy techniques: Joint mobilizations, Manual traction, Myofacial release, Soft tissue Mobilization, and Friction Massage were applied to the: lumbar spine for 10 minutes, including:  Left SIJ distraction manipulation - not today  L4-S1 lumbar gapping - not today  Left hip long axis traction     therapeutic exercises to develop strength, endurance, ROM, flexibility, posture, and core stabilization for 44 minutes including:     Hooklying TAs: 15x5'' holds   Transverse abdominis bracing with ball squeeze: 5"x2 minutes  Bridges with TA: 2x15 double leg   Straight " "leg raise: 2x15 left, 1#   Hook lying hip abduction isometrics: 5"x2 minutes   Sidelying clams: 10x10'' holds Bilateral   Open books: 10x10'' holds Bilateral   Sidelying hip abduction: 2x12 left   Modified side plank: 10x5"    Prone on elbows: 6x10'' holds   Prone quad stretch: 5x20'' holds with strap, left, pillow under stomach     Leg press: 5.5 plates 2x20 sled 4     Patient Education and Home Exercises     Home Exercises Provided and Patient Education Provided   Education provided:   - importance of home exercise program  - anatomy  - importance of body mechanics    Written Home Exercises Provided: yes. Exercises were reviewed and Ginger was able to demonstrate them prior to the end of the session.  Ginger demonstrated good  understanding of the education provided. See EMR under Patient Instructions for exercises provided during therapy sessions    ASSESSMENT     Ginger is a 67 y.o. female referred to outpatient Physical Therapy with a medical diagnosis of left SIJ pain and gluteal tendinopathy.  No change in left thigh numbness, and still some aching in low back at end of session. Patient completed her therapy and had no difficulty with new exercises added today with no increase in symptoms prior to leaving the clinic.      Ginger Is progressing well towards her goals.   Pt prognosis is Good.     Pt will continue to benefit from skilled outpatient physical therapy to address the deficits listed in the problem list box on initial evaluation, provide pt/family education and to maximize pt's level of independence in the home and community environment.     Pt's spiritual, cultural and educational needs considered and pt agreeable to plan of care and goals.     Anticipated barriers to physical therapy: None    Goals:   Short Term Goals: 3 weeks:  1. Patient will demonstrate left hip abduction MMT at least 4+/5 for improved ADL performance  2. Patient will demonstrate (-) Slump test.  3. Patient will report worst pain less than " 4/10 in left buttock/leg for improved tolerance to ADL performance.     Long Term Goals: 6 weeks:  1. Patient will demonstrate proper squatting for to  objects without an increase in symptoms.  2. Patient will demonstrate appropriate body mechanics with laundry, vacuuming and sweeping.  3. Patient will improve FOTO to < TBD to improve ADL performance.  4. Patient will report symptoms back to her baseline.    PLAN     Cont per POC.     Henrik Etienne, PTA

## 2022-09-20 ENCOUNTER — CLINICAL SUPPORT (OUTPATIENT)
Dept: REHABILITATION | Facility: HOSPITAL | Age: 67
End: 2022-09-20
Payer: MEDICARE

## 2022-09-20 DIAGNOSIS — Z74.09 DECREASED FUNCTIONAL MOBILITY AND ENDURANCE: Primary | ICD-10-CM

## 2022-09-20 PROCEDURE — 97110 THERAPEUTIC EXERCISES: CPT | Mod: PN,CQ

## 2022-09-20 NOTE — PROGRESS NOTES
"[facility]   Physical Therapy Treatment Note     Name: Ginger MCCARTNEY [facility] Number: [id number]    Therapy Diagnosis:   Encounter Diagnosis   Name Primary?    Decreased functional mobility and endurance Yes     Physician: Natacha Marcus MD     Visit Date: 9/20/2022    Physician Orders: PT Eval and Treat:  Please work on SIJ dysfunction, left GTB pain, IT band tightness and gluteal stretching/strengthening.    Medical Diagnosis from Referral: M53.3 (ICD-10-CM) - Sacroiliac joint dysfunction of left side M67.952 (ICD-10-CM) - Tendinopathy of left gluteal region      Evaluation Date: 8/26/2022  Authorization Period Expiration: 8/25/23  Plan of Care Expiration: 10/3/22  Progress Note Due: 10/3/22  Visit # / Visits authorized: 6/20  FOTO: 6/5 NEXT VISIT  PTA: 2/5     Precautions: Standard    Time In: 3:00 PM  Time Out: 3:48 PM  Total Billable Time: 48 minutes (3 TE)    SUBJECTIVE     Pt reports: her pain is better overall since the injection.  She was compliant with home exercise program.  Response to previous treatment: decreased pain.  Functional change: not at this time.    Pain: 2/10  Location: left buttock and low back region    OBJECTIVE     Objective Measures updated at progress report unless specified.     Treatment     Ginger received the treatments listed below:      manual therapy techniques: Joint mobilizations, Manual traction, Myofacial release, Soft tissue Mobilization, and Friction Massage were applied to the: lumbar spine for 00 minutes, including:  Left SIJ distraction manipulation - not today  L4-S1 lumbar gapping - not today  Left hip long axis traction     therapeutic exercises to develop strength, endurance, ROM, flexibility, posture, and core stabilization for 48 minutes including:     Hooklying TAs: 15x5'' holds   Transverse abdominis bracing with ball squeeze: 5"x3 minutes  Bridges with TA: 2x15 double leg   Straight leg raise: 3x10 left, 1#   Hook lying hip abduction " "isometrics: 5"x3 minutes   Sidelying clams: 10x10'' holds Bilateral   Open books: 10x10'' holds Bilateral   Sidelying hip abduction: 2x12 left   Modified side plank: 10x5"    Prone on elbows: 8x10'' holds   Prone quad stretch: 5x20'' holds with strap, left, pillow under stomach   Prone hip extension: x10 Bilateral     Hip flexor stretch on 2nd step: 10"x5 with glut focus Bilateral   Step ups 1st step with glut focus: x10 Bilateral   Leg press: 5.5 plates 2x20 sled 4   Hip machine hip extension with bent knee: 2 plates x10    Patient Education and Home Exercises     Home Exercises Provided and Patient Education Provided   Education provided:   - importance of home exercise program  - anatomy  - importance of body mechanics    Written Home Exercises Provided: yes. Exercises were reviewed and Ginger was able to demonstrate them prior to the end of the session.  Ginger demonstrated good  understanding of the education provided. See EMR under Patient Instructions for exercises provided during therapy sessions    ASSESSMENT     Ginger is a 67 y.o. female referred to outpatient Physical Therapy with a medical diagnosis of left SIJ pain and gluteal tendinopathy.  No change in left thigh numbness, and still some aching in low back at end of session. Patient had no difficulty with new exercises added today along with today's progressions, noted in bold, with no increase in symptoms prior to leaving the clinic.      Ginger Is progressing well towards her goals.   Pt prognosis is Good.     Pt will continue to benefit from skilled outpatient physical therapy to address the deficits listed in the problem list box on initial evaluation, provide pt/family education and to maximize pt's level of independence in the home and community environment.     Pt's spiritual, cultural and educational needs considered and pt agreeable to plan of care and goals.     Anticipated barriers to physical therapy: None    Goals:   Short Term Goals: 3 weeks:  1. " Patient will demonstrate left hip abduction MMT at least 4+/5 for improved ADL performance  2. Patient will demonstrate (-) Slump test.  3. Patient will report worst pain less than 4/10 in left buttock/leg for improved tolerance to ADL performance.     Long Term Goals: 6 weeks:  1. Patient will demonstrate proper squatting for to  objects without an increase in symptoms.  2. Patient will demonstrate appropriate body mechanics with laundry, vacuuming and sweeping.  3. Patient will improve FOTO to < TBD to improve ADL performance.  4. Patient will report symptoms back to her baseline.    PLAN     Cont per POC.     Henrik Etienne, PTA

## 2022-09-22 ENCOUNTER — CLINICAL SUPPORT (OUTPATIENT)
Dept: REHABILITATION | Facility: HOSPITAL | Age: 67
End: 2022-09-22
Payer: MEDICARE

## 2022-09-22 DIAGNOSIS — Z74.09 DECREASED FUNCTIONAL MOBILITY AND ENDURANCE: Primary | ICD-10-CM

## 2022-09-22 PROCEDURE — 97110 THERAPEUTIC EXERCISES: CPT | Mod: PN | Performed by: PHYSICAL THERAPIST

## 2022-09-22 PROCEDURE — 97014 ELECTRIC STIMULATION THERAPY: CPT | Mod: PN | Performed by: PHYSICAL THERAPIST

## 2022-09-22 NOTE — PROGRESS NOTES
[facility]   Physical Therapy Treatment Note     Name: Ginger MCCARTNEY [facility] Number: [id number]    Therapy Diagnosis:   Encounter Diagnosis   Name Primary?    Decreased functional mobility and endurance Yes       Physician: Natacha Marcus MD     Visit Date: 9/22/2022    Physician Orders: PT Eval and Treat:  Please work on SIJ dysfunction, left GTB pain, IT band tightness and gluteal stretching/strengthening.    Medical Diagnosis from Referral: M53.3 (ICD-10-CM) - Sacroiliac joint dysfunction of left side M67.952 (ICD-10-CM) - Tendinopathy of left gluteal region      Evaluation Date: 8/26/2022  Authorization Period Expiration: 8/25/23  Plan of Care Expiration: 10/3/22  Progress Note Due: 10/3/22  Visit # / Visits authorized: 7/20 (+1)  FOTO: 8/5 NEXT VISIT  PTA: 0/5     Precautions: Standard    Time In: 1:00 PM  Time Out: 2:10 PM  Total Billable Time: 28 1:1 and 30 supervised minutes + 10 unattended e-stim (2 TE, 1 e-stim)    SUBJECTIVE     Pt reports: her pain is better overall since the injection.  She was compliant with home exercise program.  Response to previous treatment: decreased pain.  Functional change: not at this time.    Pain: 2/10  Location: left buttock and low back region    OBJECTIVE     9/22/22  (-) Ely's test  Gluteus medius 4+/5  Good pelvic alignment  (-) TYLOR  Tender to palpation left gluteals  (-) Lateral step down test    Treatment     Ginger received the treatments listed below:      manual therapy techniques: Joint mobilizations, Manual traction, Myofacial release, Soft tissue Mobilization, and Friction Massage were applied to the: lumbar spine for 00 minutes, including:  Left SIJ distraction manipulation - not today  L4-S1 lumbar gapping - not today  Left hip long axis traction     therapeutic exercises to develop strength, endurance, ROM, flexibility, posture, and core stabilization for 28 1:1 and 30 supervised minutes including testing:     Hooklying TAs:  "15x5'' holds   Transverse abdominis bracing with ball squeeze: 5"x3 minutes  Bridges with TA: 2x15 double leg   Straight leg raise: 3x10 left, 1#   Hook lying hip abduction isometrics: 5"x3 minutes   Sidelying clams: 10x10'' holds Bilateral yellow theraband   Open books: 10x10'' holds Bilateral   Sidelying hip abduction: 2x15 left   Modified side plank: 10x10"    Prone on elbows: 8x10'' holds   Prone hip extension: 3x10 Bilateral     Dry needling to left piriformis and gluteus medius to improve pain and muscle activation    Not today:  Hip flexor stretch on 2nd step: 10"x5 with glut focus Bilateral   Step ups 1st step with glut focus: x10 Bilateral   Leg press: 5.5 plates 2x20 sled 4   Hip machine hip extension with bent knee: 2 plates x10    Ginger received the following supervised modalities after being cleared for contradictions: dry needling: Ginger received TENS electrical stimulation for pain to the left buttock/hip. Electrical stimulation was applied to these needles at 4 Hz and a pulse of 250 µseconds for 15 minutes with a jail check. Ginger reported treatment well without any adverse effects.     Patient Education and Home Exercises     Home Exercises Provided and Patient Education Provided   Education provided:   - importance of home exercise program  - anatomy  - importance of body mechanics    Written Home Exercises Provided: yes. Exercises were reviewed and Ginger was able to demonstrate them prior to the end of the session.  Ginger demonstrated good  understanding of the education provided. See EMR under Patient Instructions for exercises provided during therapy sessions    ASSESSMENT     Ginger is a 67 y.o. female referred to outpatient Physical Therapy with a medical diagnosis of left SIJ pain and gluteal tendinopathy.  Pain localized to left buttock in area of gluteus medius and piriformis. (-) lateral step down test and 5/5 MMT of gluteus medius today. Quad length is now symmetrical. Main complaint of pain with " endurance activities like walking further distances.    Ginger was agreeable to dry needling by a certified dry needling PT and signed a consent prior to treatment after being made aware of risks. 6x75 mm needles were inserted into the left gluteus medius and piriformis. Winding for grasp were performed. Electrical stimulation was applied to these needles at 4 Hz and a pulse of 250 µseconds for 10 minutes with a skilled nursing check. Afterwards, needles were removed and placed into a sharps container. Sierra reported a good response to treatment.     Ginger Is progressing well towards her goals.   Pt prognosis is Good.     Pt will continue to benefit from skilled outpatient physical therapy to address the deficits listed in the problem list box on initial evaluation, provide pt/family education and to maximize pt's level of independence in the home and community environment.     Pt's spiritual, cultural and educational needs considered and pt agreeable to plan of care and goals.     Anticipated barriers to physical therapy: None    Goals:   Short Term Goals: 3 weeks:  1. Patient will demonstrate left hip abduction MMT at least 4+/5 for improved ADL performance. MET  2. Patient will demonstrate (-) Slump test. MET  3. Patient will report worst pain less than 4/10 in left buttock/leg for improved tolerance to ADL performance. MET     Long Term Goals: 6 weeks:  1. Patient will demonstrate proper squatting for to  objects without an increase in symptoms. PROGRESSING; NOT MET  2. Patient will demonstrate appropriate body mechanics with laundry, vacuuming and sweeping. PROGRESSING; NOT MET  3. Patient will improve FOTO to < TBD to improve ADL performance. PROGRESSING; NOT MET  4. Patient will report symptoms back to her baseline. PROGRESSING; NOT MET    PLAN     Cont per POC. Assess response to dry needling and perform PRN if appropriate    Marcell Chacon, PT

## 2022-09-27 ENCOUNTER — CLINICAL SUPPORT (OUTPATIENT)
Dept: REHABILITATION | Facility: HOSPITAL | Age: 67
End: 2022-09-27
Payer: MEDICARE

## 2022-09-27 DIAGNOSIS — Z74.09 DECREASED FUNCTIONAL MOBILITY AND ENDURANCE: Primary | ICD-10-CM

## 2022-09-27 PROCEDURE — 97140 MANUAL THERAPY 1/> REGIONS: CPT | Mod: PN,CQ

## 2022-09-27 PROCEDURE — 97110 THERAPEUTIC EXERCISES: CPT | Mod: PN,CQ

## 2022-09-27 NOTE — PROGRESS NOTES
[facility]   Physical Therapy Treatment Note     Name: Ginger MCCARTNEY [facility] Number: [id number]    Therapy Diagnosis:   Encounter Diagnosis   Name Primary?    Decreased functional mobility and endurance Yes     Physician: Natacha Marcus MD     Visit Date: 9/27/2022    Physician Orders: PT Eval and Treat:  Please work on SIJ dysfunction, left GTB pain, IT band tightness and gluteal stretching/strengthening.    Medical Diagnosis from Referral: M53.3 (ICD-10-CM) - Sacroiliac joint dysfunction of left side M67.952 (ICD-10-CM) - Tendinopathy of left gluteal region      Evaluation Date: 8/26/2022  Authorization Period Expiration: 8/25/23  Plan of Care Expiration: 10/3/22  Progress Note Due: 10/3/22  Visit # / Visits authorized: 8/20 (+1)  FOTO: 9/5 NEXT VISIT  PTA: 1/5     Precautions: Standard    Time In: 12:00 PM  Time Out: 12:58 PM  Total Billable Time: 58 minutes  (3-TE, MT-1)    SUBJECTIVE     Pt reports: she was very sore after the dry needling last visit but by the next day she was ok.  Patient states that she is not sure if it is the injection, the dry needling or a combination of both but she is feeling better.   She was compliant with home exercise program.  Response to previous treatment: decreased pain.  Functional change: not at this time.    Pain: 2/10  Location: left buttock and low back region    OBJECTIVE     9/22/22  (-) Ely's test  Gluteus medius 4+/5  Good pelvic alignment  (-) TYLOR  Tender to palpation left gluteals  (-) Lateral step down test    Treatment     Ginger received the treatments listed below:      manual therapy techniques: Joint mobilizations, Manual traction, Myofacial release, Soft tissue Mobilization, and Friction Massage were applied to the: lumbar spine for 10 minutes, including:    Soft tissue mobs to left piriformis and glut.  Left SIJ distraction manipulation - not today  L4-S1 lumbar gapping - not today  Left hip long axis traction - not  "today    therapeutic exercises to develop strength, endurance, ROM, flexibility, posture, and core stabilization for 48 minutes including testing:     Hooklying TAs: 15x5'' holds   Transverse abdominis bracing with ball squeeze: 5"x3 minutes  Bridges with TA: 2x15 double leg   Straight leg raise: 3x10 left, 1#   Hook lying hip abduction isometrics: 5"x3 minutes   Sidelying clams: 10x10'' holds Bilateral yellow theraband   Open books: 10x10'' holds Bilateral   Sidelying hip abduction: 2x15 left   Modified side plank: 10x10"    Prone on elbows: 8x10'' holds   Prone hip extension: 3x10 Bilateral     Dry needling to left piriformis and gluteus medius to improve pain and muscle activation - not today     Not today:  Hip flexor stretch on 2nd step: 10"x5 with glut focus Bilateral   Step ups 1st step with glut focus: x10 Bilateral   Leg press: 5.5 plates 2x20 sled 4   Hip machine hip extension with bent knee: 2 plates x10    Ginger received the following supervised modalities after being cleared for contradictions: dry needling: Ginger received TENS electrical stimulation for pain to the left buttock/hip. Electrical stimulation was applied to these needles at 4 Hz and a pulse of 250 µseconds for 00 minutes with a long-term check. Ginger reported treatment well without any adverse effects.     Patient Education and Home Exercises     Home Exercises Provided and Patient Education Provided   Education provided:   - importance of home exercise program  - anatomy  - importance of body mechanics    Written Home Exercises Provided: yes. Exercises were reviewed and Ginger was able to demonstrate them prior to the end of the session.  Ginger demonstrated good  understanding of the education provided. See EMR under Patient Instructions for exercises provided during therapy sessions    ASSESSMENT     Ginger is a 67 y.o. female referred to outpatient Physical Therapy with a medical diagnosis of left SIJ pain and gluteal tendinopathy.  Pain localized to " left buttock in area of gluteus medius and piriformis. Patient requires occasional cues for correct positioning with side lying exercises. Patient did not notice any specific relief from the dry needling at this time.    Ginger Is progressing well towards her goals.   Pt prognosis is Good.     Pt will continue to benefit from skilled outpatient physical therapy to address the deficits listed in the problem list box on initial evaluation, provide pt/family education and to maximize pt's level of independence in the home and community environment.     Pt's spiritual, cultural and educational needs considered and pt agreeable to plan of care and goals.     Anticipated barriers to physical therapy: None    Goals:   Short Term Goals: 3 weeks:  1. Patient will demonstrate left hip abduction MMT at least 4+/5 for improved ADL performance. MET  2. Patient will demonstrate (-) Slump test. MET  3. Patient will report worst pain less than 4/10 in left buttock/leg for improved tolerance to ADL performance. MET     Long Term Goals: 6 weeks:  1. Patient will demonstrate proper squatting for to  objects without an increase in symptoms. PROGRESSING; NOT MET  2. Patient will demonstrate appropriate body mechanics with laundry, vacuuming and sweeping. PROGRESSING; NOT MET  3. Patient will improve FOTO to < TBD to improve ADL performance. PROGRESSING; NOT MET  4. Patient will report symptoms back to her baseline. PROGRESSING; NOT MET    PLAN     Cont per POC. Assess response to dry needling and perform PRN if appropriate    Henrik Etienne, PTA

## 2022-09-28 ENCOUNTER — DOCUMENTATION ONLY (OUTPATIENT)
Dept: REHABILITATION | Facility: HOSPITAL | Age: 67
End: 2022-09-28
Payer: MEDICARE

## 2022-09-28 NOTE — PROGRESS NOTES
PT met face to face with Henrik Etienne PTA to discuss pt's treatment plan and progress towards established goals. Pt will be seen by a physical therapist minimally every 6th visit or every 30 days.    Marcell Chacon, PT

## 2022-09-29 ENCOUNTER — CLINICAL SUPPORT (OUTPATIENT)
Dept: REHABILITATION | Facility: HOSPITAL | Age: 67
End: 2022-09-29
Payer: MEDICARE

## 2022-09-29 DIAGNOSIS — Z74.09 DECREASED FUNCTIONAL MOBILITY AND ENDURANCE: Primary | ICD-10-CM

## 2022-09-29 PROCEDURE — 97140 MANUAL THERAPY 1/> REGIONS: CPT | Mod: PN,CQ

## 2022-09-29 PROCEDURE — 97110 THERAPEUTIC EXERCISES: CPT | Mod: PN,CQ

## 2022-09-29 NOTE — PROGRESS NOTES
[facility]   Physical Therapy Treatment Note     Name: Ginger MCCARTNEY [facility] Number: [id number]    Therapy Diagnosis:   Encounter Diagnosis   Name Primary?    Decreased functional mobility and endurance Yes     Physician: Natacha Marcus MD     Visit Date: 9/29/2022    Physician Orders: PT Eval and Treat:  Please work on SIJ dysfunction, left GTB pain, IT band tightness and gluteal stretching/strengthening.    Medical Diagnosis from Referral: M53.3 (ICD-10-CM) - Sacroiliac joint dysfunction of left side M67.952 (ICD-10-CM) - Tendinopathy of left gluteal region      Evaluation Date: 8/26/2022  Authorization Period Expiration: 8/25/23  Plan of Care Expiration: 10/3/22  Progress Note Due: 10/3/22  Visit # / Visits authorized: 9/20 (+1)  FOTO: 10/5 NEXT VISIT  PTA: 2/5     Precautions: Standard    Time In: 3:00 PM  Time Out: 3:58 PM  Total Billable Time: 58 minutes  (3-TE, MT-1)    SUBJECTIVE     Pt reports: her pain is about the same as last visit.    She was compliant with home exercise program.  Response to previous treatment: decreased pain.  Functional change: not at this time.    Pain: 2/10  Location: left buttock and low back region    OBJECTIVE     9/22/22  (-) Ely's test  Gluteus medius 4+/5  Good pelvic alignment  (-) TYLOR  Tender to palpation left gluteals  (-) Lateral step down test    Treatment     Ginger received the treatments listed below:      manual therapy techniques: Joint mobilizations, Manual traction, Myofacial release, Soft tissue Mobilization, and Friction Massage were applied to the: lumbar spine for 10 minutes, including:    Soft tissue mobs to left piriformis and glut.  Left SIJ distraction manipulation - not today  L4-S1 lumbar gapping - not today  Left hip long axis traction - not today    therapeutic exercises to develop strength, endurance, ROM, flexibility, posture, and core stabilization for 48 minutes including testing:     Hooklying TAs: 15x5'' holds  "  Transverse abdominis bracing with ball squeeze: 5"x3 minutes  Bridges with TA: 2x15 double leg   Straight leg raise: 3x10 left, 1.5#   Hook lying hip abduction isometrics: 5"x3 minutes   Sidelying clams: 10x10'' holds Bilateral yellow theraband   Open books: 10x10'' holds Bilateral   Sidelying hip abduction: 2x15 left   Modified side plank: 10x10"    Prone on elbows: 10x10'' holds   Prone hip extension: 3x10 Bilateral     Dry needling to left piriformis and gluteus medius to improve pain and muscle activation - not today     Not today:  Hip flexor stretch on 2nd step: 10"x5 with glut focus Bilateral   Step ups 1st step with glut focus: x10 Bilateral   Leg press: 5.5 plates 2x20 sled 4   Hip machine hip extension with bent knee: 2 plates x10    Ginger received the following supervised modalities after being cleared for contradictions: dry needling: Ginger received TENS electrical stimulation for pain to the left buttock/hip. Electrical stimulation was applied to these needles at 4 Hz and a pulse of 250 µseconds for 00 minutes with a penitentiary check. Ginger reported treatment well without any adverse effects.     Patient Education and Home Exercises     Home Exercises Provided and Patient Education Provided   Education provided:   - importance of home exercise program  - anatomy  - importance of body mechanics    Written Home Exercises Provided: yes. Exercises were reviewed and Ginger was able to demonstrate them prior to the end of the session.  Ginger demonstrated good  understanding of the education provided. See EMR under Patient Instructions for exercises provided during therapy sessions    ASSESSMENT     Ginger is a 67 y.o. female referred to outpatient Physical Therapy with a medical diagnosis of left SIJ pain and gluteal tendinopathy.  Pain localized to left buttock in area of gluteus medius and piriformis. Patient requires occasional cues for correct positioning with side lying exercises. Patient had no difficulty with today's " progressions, noted in bold, with no increase in symptoms prior to leaving the clinic.     Ginger Is progressing well towards her goals.   Pt prognosis is Good.     Pt will continue to benefit from skilled outpatient physical therapy to address the deficits listed in the problem list box on initial evaluation, provide pt/family education and to maximize pt's level of independence in the home and community environment.     Pt's spiritual, cultural and educational needs considered and pt agreeable to plan of care and goals.     Anticipated barriers to physical therapy: None    Goals:   Short Term Goals: 3 weeks:  1. Patient will demonstrate left hip abduction MMT at least 4+/5 for improved ADL performance. MET  2. Patient will demonstrate (-) Slump test. MET  3. Patient will report worst pain less than 4/10 in left buttock/leg for improved tolerance to ADL performance. MET     Long Term Goals: 6 weeks:  1. Patient will demonstrate proper squatting for to  objects without an increase in symptoms. PROGRESSING; NOT MET  2. Patient will demonstrate appropriate body mechanics with laundry, vacuuming and sweeping. PROGRESSING; NOT MET  3. Patient will improve FOTO to < TBD to improve ADL performance. PROGRESSING; NOT MET  4. Patient will report symptoms back to her baseline. PROGRESSING; NOT MET    PLAN     Cont per POC. Assess response to dry needling and perform PRN if appropriate    Henrik Etienne, PTA

## 2022-10-03 ENCOUNTER — CLINICAL SUPPORT (OUTPATIENT)
Dept: REHABILITATION | Facility: HOSPITAL | Age: 67
End: 2022-10-03
Payer: MEDICARE

## 2022-10-03 DIAGNOSIS — Z74.09 DECREASED FUNCTIONAL MOBILITY AND ENDURANCE: Primary | ICD-10-CM

## 2022-10-03 PROCEDURE — 97110 THERAPEUTIC EXERCISES: CPT | Mod: PN | Performed by: PHYSICAL THERAPIST

## 2022-10-03 NOTE — PROGRESS NOTES
"[facility]   Physical Therapy DISCHARGE Note     Name: Ginger Marshall  [facility] Number: [id number]    Therapy Diagnosis:   Encounter Diagnosis   Name Primary?    Decreased functional mobility and endurance Yes     Physician: Natacha Marcus MD     Visit Date: 10/3/2022    Physician Orders: PT Eval and Treat:  Please work on SIJ dysfunction, left GTB pain, IT band tightness and gluteal stretching/strengthening.    Medical Diagnosis from Referral: M53.3 (ICD-10-CM) - Sacroiliac joint dysfunction of left side M67.952 (ICD-10-CM) - Tendinopathy of left gluteal region      Evaluation Date: 8/26/2022  Authorization Period Expiration: 8/25/23  Plan of Care Expiration: 10/3/22  Progress Note Due: 10/3/22  Visit # / Visits authorized: 10/20 (+1)  FOTO: Performed today  PTA: 2/5     Precautions: Standard    Time In: 11:16 AM  Time Out: 12:35 PM  Total Billable Time: 17 minutes  (1-TE)    SUBJECTIVE     Pt reports: her pain stays around a 2/10 until a little more aching at the end of the day. "Overall, I've had big improvement."  She was compliant with home exercise program.  Response to previous treatment: decreased pain.  Functional change: not at this time.    Pain: 2/10  Location: left buttock and low back region    OBJECTIVE     9/22/22  (-) Ely's test  Good pelvic alignment  (-) TYLOR  Tender to palpation left gluteals  (-) Lateral step down test      AROM:    Degrees Pain/Dysfunction   Flexion Fingertips to floor Repeated standing: NP   Extension 25 NP   Right Rotation 100% NP   Left Rotation 100% NP   Right Side Bending 50 cm NP   Left Side Bending 50 cm NP      Strength:  RLE   LLE     Hip flexion: 5/5 Hip flexion: 5/5   Hip Abduction: 5/5 Hip abduction: 5/5   Hip extension 5/5 Hip extension 5/5   Hip ER 5/5 Hip ER 5/5   Hip IR 5/5 Hip IR 5/5   Knee flexion: 5/5 Knee flexion: 5/5   Knee extension: 5/5 Knee extension: 5/5        Treatment     Ginger received the treatments listed below:  "       therapeutic exercises to develop strength, endurance, ROM, flexibility, posture, and core stabilization for 17 minutes including re-assessment and FOTO:     See re-assessment in objective measurements    Patient Education and Home Exercises     Home Exercises Provided and Patient Education Provided   Education provided:   - importance of home exercise program  - anatomy  - importance of body mechanics    Written Home Exercises Provided: yes. Exercises were reviewed and Ginger was able to demonstrate them prior to the end of the session.  Ginger demonstrated good  understanding of the education provided. See EMR under Patient Instructions for exercises provided during therapy sessions    ASSESSMENT     Ginger is a 67 y.o. female referred to outpatient Physical Therapy with a medical diagnosis of left SIJ pain and gluteal tendinopathy.  Pain appears well controlled at 2/10 and does not impact her daily routine or ADLs. She presents with all 5/5 strength, (-) lateral step down test and good flexibility. She's been educated on proper body and lifting mechanics with a good understanding. She has met all STG and LTGs. She is both agreeable and appropriate for discharge at this time.    Ginger Is progressing well towards her goals.   Pt prognosis is Good.     Pt will continue to benefit from skilled outpatient physical therapy to address the deficits listed in the problem list box on initial evaluation, provide pt/family education and to maximize pt's level of independence in the home and community environment.     Pt's spiritual, cultural and educational needs considered and pt agreeable to plan of care and goals.     Anticipated barriers to physical therapy: None    Goals:   Short Term Goals: 3 weeks:  1. Patient will demonstrate left hip abduction MMT at least 4+/5 for improved ADL performance. MET  2. Patient will demonstrate (-) Slump test. MET  3. Patient will report worst pain less than 4/10 in left buttock/leg for  improved tolerance to ADL performance. MET     Long Term Goals: 6 weeks:  1. Patient will demonstrate proper squatting for to  objects without an increase in symptoms. MET  2. Patient will demonstrate appropriate body mechanics with laundry, vacuuming and sweeping. MET  3. Patient will improve FOTO to < 32% TBD to improve ADL performance. MET  4. Patient will report symptoms back to her baseline. MET    PLAN     DISCHARGE    Marcell Chacon, PT

## 2022-10-04 ENCOUNTER — OFFICE VISIT (OUTPATIENT)
Dept: PAIN MEDICINE | Facility: CLINIC | Age: 67
End: 2022-10-04
Payer: MEDICARE

## 2022-10-04 VITALS
SYSTOLIC BLOOD PRESSURE: 122 MMHG | WEIGHT: 200.38 LBS | BODY MASS INDEX: 32.35 KG/M2 | DIASTOLIC BLOOD PRESSURE: 86 MMHG | HEART RATE: 79 BPM

## 2022-10-04 DIAGNOSIS — M54.16 LUMBAR RADICULOPATHY: ICD-10-CM

## 2022-10-04 DIAGNOSIS — M67.952 TENDINOPATHY OF LEFT GLUTEAL REGION: ICD-10-CM

## 2022-10-04 DIAGNOSIS — M54.50 CHRONIC LEFT-SIDED LOW BACK PAIN WITHOUT SCIATICA: ICD-10-CM

## 2022-10-04 DIAGNOSIS — M70.62 TROCHANTERIC BURSITIS OF LEFT HIP: ICD-10-CM

## 2022-10-04 DIAGNOSIS — M76.32 ILIOTIBIAL BAND SYNDROME, LEFT: ICD-10-CM

## 2022-10-04 DIAGNOSIS — M53.3 SACROILIAC JOINT DYSFUNCTION OF LEFT SIDE: Primary | ICD-10-CM

## 2022-10-04 DIAGNOSIS — G57.12 MERALGIA PARESTHETICA OF LEFT SIDE: ICD-10-CM

## 2022-10-04 DIAGNOSIS — G89.29 CHRONIC LEFT-SIDED LOW BACK PAIN WITHOUT SCIATICA: ICD-10-CM

## 2022-10-04 PROCEDURE — 3074F PR MOST RECENT SYSTOLIC BLOOD PRESSURE < 130 MM HG: ICD-10-PCS | Mod: CPTII,S$GLB,, | Performed by: NURSE PRACTITIONER

## 2022-10-04 PROCEDURE — 3044F PR MOST RECENT HEMOGLOBIN A1C LEVEL <7.0%: ICD-10-PCS | Mod: CPTII,S$GLB,, | Performed by: NURSE PRACTITIONER

## 2022-10-04 PROCEDURE — 99999 PR PBB SHADOW E&M-EST. PATIENT-LVL III: ICD-10-PCS | Mod: PBBFAC,,, | Performed by: NURSE PRACTITIONER

## 2022-10-04 PROCEDURE — 3008F PR BODY MASS INDEX (BMI) DOCUMENTED: ICD-10-PCS | Mod: CPTII,S$GLB,, | Performed by: NURSE PRACTITIONER

## 2022-10-04 PROCEDURE — 1159F PR MEDICATION LIST DOCUMENTED IN MEDICAL RECORD: ICD-10-PCS | Mod: CPTII,S$GLB,, | Performed by: NURSE PRACTITIONER

## 2022-10-04 PROCEDURE — 99214 OFFICE O/P EST MOD 30 MIN: CPT | Mod: S$GLB,,, | Performed by: NURSE PRACTITIONER

## 2022-10-04 PROCEDURE — 99999 PR PBB SHADOW E&M-EST. PATIENT-LVL III: CPT | Mod: PBBFAC,,, | Performed by: NURSE PRACTITIONER

## 2022-10-04 PROCEDURE — 1159F MED LIST DOCD IN RCRD: CPT | Mod: CPTII,S$GLB,, | Performed by: NURSE PRACTITIONER

## 2022-10-04 PROCEDURE — 3079F PR MOST RECENT DIASTOLIC BLOOD PRESSURE 80-89 MM HG: ICD-10-PCS | Mod: CPTII,S$GLB,, | Performed by: NURSE PRACTITIONER

## 2022-10-04 PROCEDURE — 3074F SYST BP LT 130 MM HG: CPT | Mod: CPTII,S$GLB,, | Performed by: NURSE PRACTITIONER

## 2022-10-04 PROCEDURE — 3079F DIAST BP 80-89 MM HG: CPT | Mod: CPTII,S$GLB,, | Performed by: NURSE PRACTITIONER

## 2022-10-04 PROCEDURE — 3008F BODY MASS INDEX DOCD: CPT | Mod: CPTII,S$GLB,, | Performed by: NURSE PRACTITIONER

## 2022-10-04 PROCEDURE — 3044F HG A1C LEVEL LT 7.0%: CPT | Mod: CPTII,S$GLB,, | Performed by: NURSE PRACTITIONER

## 2022-10-04 PROCEDURE — 99214 PR OFFICE/OUTPT VISIT, EST, LEVL IV, 30-39 MIN: ICD-10-PCS | Mod: S$GLB,,, | Performed by: NURSE PRACTITIONER

## 2022-10-04 NOTE — H&P (VIEW-ONLY)
[facility]    Chief Complaint:   Chief Complaint   Patient presents with    Low-back Pain    Leg Pain     Left            History of Present Illness: Ginger Marshall is a 67 y.o. female referred by Dr. Rachel Camacho for low back and left lower extremity pain. Lateral left leg pain started about 12 years ago as numbness but about a year ago it started becoming more painful with pins and needles and a burning sensation with activity. She was seeing a chiropractor who tried manipulations and a spinal decompression device which did not help. Wakes her up at night. Somewhat better with rest.     Onset: 12 years  Location: low back  Radiation: left leg, up to knee  Timing: constant  Quality: Aching, Dull, Burning, Tingling, Deep, Numb and Hot  Exacerbating Factors: exercise, lifting, lying down, standing for more than 10 minutes and walking for more than 20 minutes  Alleviating Factors: rest and sitting  Associated Symptoms: denies urinary incontinence, bowel incontinence, significant weight loss and significant motor weakness    Severity: Currently: 3/10   Typical Range: 3-8/10     Exacerbation: 8/10     Interval History (10/04/2022):  Ginger Marshall returns today for follow up.  At the last clinic visit she was provide a LFCN block 50% relief.  She reports continued pain in the lateral aspect of her left leg, she does describe burning that is worse when standing or walking for long periods of time, she did report mild improvement but continues to have numbing in that area.  She also complains of left low back pain.     Current Pain Scales:  Current: 2/10              Typical Range: 2-3/10       (09/07/2022):  Ginger Marshall returns today for follow up.  At the last clinic visit, referred to PT and scheduled LFCN block.    She is in PT and this is going well.     Currently, the back and lateral hip pain is stable.  She still has left leg numbness. She denies any significant changes since previous visit.       Current  Pain Scales:  Current: 4/10              Typical Range: 3-8/10       Pain Disability Index  Family/Home Responsibilities:: 2  Recreation:: 2  Social Activity:: 1  Occupation:: 1  Sexual Behavior:: 1  Self Care:: 1  Life-Support Activities:: 1  Pain Disability Index (PDI): 9    Previous Interventions:  -2022 left femoral cutaneous nerve block 50% relief    Previous Therapies:  PT/OT: yes   Relevant Surgery: no   Previous Medications:   - NSAIDS: Occasionally uses Ibuprofen, Tylenol which help a little  - Muscle Relaxants:    - TCAs:   - SNRIs:   - Topicals:   - Anticonvulsants:    - Opioids:     Current Pain Medications:  1-2x/week BC Powder or Ibuprofen PM     Blood Thinners: None    Full Medication List:    Current Outpatient Medications:     amLODIPine (NORVASC) 5 MG tablet, Take 1 tablet (5 mg total) by mouth once daily., Disp: 90 tablet, Rfl: 3    atorvastatin (LIPITOR) 40 MG tablet, Take 1 tablet (40 mg total) by mouth once daily., Disp: 90 tablet, Rfl: 3    levothyroxine (SYNTHROID) 112 MCG tablet, Take 1 tablet (112 mcg total) by mouth once daily., Disp: 90 tablet, Rfl: 3    multivitamin capsule, Take 1 capsule by mouth once daily., Disp: , Rfl:      Review of Systems:  ROS    Allergies:  Patient has no known allergies.     Medical History:   has a past medical history of Basal cell carcinoma and Osteopenia.    Surgical History:   has a past surgical history that includes LASIK; Tonsillectomy;  section; and Colonoscopy (N/A, 2022).    Family History:  family history includes Brain cancer in her mother; Breast cancer in some other family members; Stroke in her father.    Social History:   reports that she has never smoked. She has never used smokeless tobacco. She reports current alcohol use. She reports that she does not use drugs.    Physical Exam:  /86   Pulse 79   Wt 90.9 kg (200 lb 6.4 oz)   LMP  (LMP Unknown)   BMI 32.35 kg/m²   GEN: No acute distress. Calm,  "comfortable  HENT: Normocephalic, atraumatic, moist mucous membranes  EYE: Anicteric sclera, non-injected.   CV: Non-diaphoretic. Regular Rate. Radial Pulses 2+.  RESP: Breathing comfortably. Chest expansion symmetric.  EXT: No clubbing, cyanosis.   SKIN: Warm, & dry to palpation. No visible rashes or lesions of exposed skin.   PSYCH: Pleasant mood and appropriate affect. Recent and remote memory intact.   GAIT: Independent, nonantalgic ambulation    Lumbar Spine Exam:       Inspection: No erythema, bruising. No surgical incisions      Palpation: (+) TTP of sacroiliac joint on left.       ROM: Not Limited in flexion, extension, lateral bending      (-) Facet loading bilaterally      (-) Straight Leg Raise bilaterally      (+) TYLOR on left      (+) Dior'sTest on left  Hip Exam:      Inspection: No gross deformity or apparent leg length discrepancy      Palpation: +TTP of left GTB.       ROM: Full internal rotation, external rotation without pain.       (-) FADIR bilaterally but with "tightness" and "stretching" on the left  Neurologic Exam:     Alert. Speech is fluent and appropriate.     Strength: 5/5 throughout bilateral lower extremities     Sensation: Abnormal to LT in LFCN distribution on the left, otherwise grossly intact to light touch in bilateral lower extremities     Reflexes: 2+ in b/l patella, achilles     Tone: No abnormality appreciated in bilateral lower extremities     No Clonus     Downgoing toes on plantar stimulation     (-) Urias bilaterally    Imaging:  - Pt has had MRI L-spine done at outside facility but will need to bring disc.     Labs:  BMP  Lab Results   Component Value Date     08/24/2022    K 4.2 08/24/2022     08/24/2022    CO2 26 08/24/2022    BUN 13 08/24/2022    CREATININE 0.8 08/24/2022    CALCIUM 9.3 08/24/2022    ANIONGAP 7 (L) 08/24/2022    ESTGFRAFRICA >60.0 01/07/2021    EGFRNONAA >60.0 01/07/2021     Lab Results   Component Value Date    ALT 19 08/24/2022    AST " 17 08/24/2022    ALKPHOS 64 08/24/2022    BILITOT 0.4 08/24/2022     Lab Results   Component Value Date     08/24/2022       Assessment:  Ginger Marshall is a 67 y.o. female with the following diagnoses based on history, exam, and imaging:    Problem List Items Addressed This Visit    None  Visit Diagnoses       Sacroiliac joint dysfunction of left side    -  Primary    Chronic left-sided low back pain without sciatica        Iliotibial band syndrome, left        Meralgia paresthetica of left side        Tendinopathy of left gluteal region        Lumbar radiculopathy        Trochanteric bursitis of left hip                This is a pleasant 67 y.o. lady presenting with:     - Left thigh numbness which appears consistent with Lateral Femoral Cutaneous Neuropathy/Meralgia Paresthetica: a snapshot of her L-spine MRI which shows a potential herniated disc with nerve impingement but symptoms fit less with this diagnosis and we will need to see all the images.   - Left low back pain: SIJ pain on exam  - Left lateral thigh/hip pain with GTBursitis and ITB syndrome  - Comorbidities: HTN, HLD, Osteopenia, NORBERT. BMI >30.     10/04/2022 67-year-old female that was provided a left lateral femoral cutaneous nerve block last clinic visit she reports 50% relief however continues to experience pain in the left lateral leg described as numbness and burning that is worse when walking, standing performing ADLs.  She also complains of left low back pain, she did have SI joint pain again upon exam.  She also has pain in the left lateral hip that is likely related to GT bursitis.    Treatment Plan:   - PT/OT/HEP:  Completed PT to work on SIJ dysfunction, GTB, and IT band tightness. Discussed benefits of exercise for pain.   - Procedures:  Scheduled for left SI joint injection and a left GTB injection  - Medications: No changes recommended at this time.   - Consider adding gabapentin in the future for neuropathic symptoms.   - Imaging:  Reviewed.  Reviewed disc and written MRI report both will be scanned into media.  - Labs: Reviewed. Medications are appropriately dosed for current hepatorenal function.    Follow Up: RTC in 2-3 weeks      ANA CRISTINA Renae  Interventional Pain Management    Disclaimer: This note was partly generated using dictation software which may occasionally result in transcription errors.

## 2022-10-05 ENCOUNTER — TELEPHONE (OUTPATIENT)
Dept: PAIN MEDICINE | Facility: CLINIC | Age: 67
End: 2022-10-05
Payer: MEDICARE

## 2022-10-05 DIAGNOSIS — M46.1 SACROILIITIS: Primary | ICD-10-CM

## 2022-10-05 DIAGNOSIS — M70.72 BURSITIS OF LEFT HIP, UNSPECIFIED BURSA: ICD-10-CM

## 2022-10-11 ENCOUNTER — PATIENT MESSAGE (OUTPATIENT)
Dept: INTERNAL MEDICINE | Facility: CLINIC | Age: 67
End: 2022-10-11
Payer: MEDICARE

## 2022-10-12 ENCOUNTER — TELEPHONE (OUTPATIENT)
Dept: PAIN MEDICINE | Facility: HOSPITAL | Age: 67
End: 2022-10-12
Payer: MEDICARE

## 2022-10-12 NOTE — TELEPHONE ENCOUNTER
Left voicemail on cell number with 0830 am arrival time on 10/13 for procedure with Dr. Villalobos, and the following:  Please remember:  Check in at the registration desk on the first floor of the hospital. [address] [facility] [address] [place] [id number]  Please DO NOT eat or drink 8 hours prior to your arrival time for the procedure, if diabetic 6 hours prior. If you are taking medications for blood pressure, heart medications, thyroid, cholesterol; you can take them with a small sip of water.  Refrain from drinking alcohol within 24 hours prior to your procedure  You will need someone to drive you home after procedure. You cannot take taxi, Uber, or Lyft.  If you start feeling sick (fever, chills, or coughing) or start on any antibiotics please contact us at [phone] to reschedule.    Notified patient that there is a small chance that a steroid may reduce the effectiveness of a COVID vaccine/booster, and that there are no studies available to verify/deny this information. Requested that the patient call back if she wants to reschedule her procedure.

## 2022-10-13 ENCOUNTER — HOSPITAL ENCOUNTER (OUTPATIENT)
Facility: HOSPITAL | Age: 67
Discharge: HOME | End: 2022-10-13
Attending: PAIN MEDICINE | Admitting: PAIN MEDICINE
Payer: MEDICARE

## 2022-10-13 VITALS
TEMPERATURE: 97 F | BODY MASS INDEX: 32.14 KG/M2 | OXYGEN SATURATION: 96 % | RESPIRATION RATE: 17 BRPM | HEART RATE: 67 BPM | SYSTOLIC BLOOD PRESSURE: 141 MMHG | WEIGHT: 200 LBS | HEIGHT: 66 IN | DIASTOLIC BLOOD PRESSURE: 79 MMHG

## 2022-10-13 DIAGNOSIS — M46.1 SACROILIITIS: ICD-10-CM

## 2022-10-13 DIAGNOSIS — G89.29 CHRONIC PAIN: ICD-10-CM

## 2022-10-13 DIAGNOSIS — M70.62 GREATER TROCHANTERIC BURSITIS OF LEFT HIP: Primary | ICD-10-CM

## 2022-10-13 PROCEDURE — 63600175 PHARM REV CODE 636 W HCPCS: Performed by: PAIN MEDICINE

## 2022-10-13 PROCEDURE — 27096 INJECT SACROILIAC JOINT: CPT | Mod: 59,LT,, | Performed by: PAIN MEDICINE

## 2022-10-13 PROCEDURE — 27096 PR INJECTION,SACROILIAC JOINT: ICD-10-PCS | Mod: 59,LT,, | Performed by: PAIN MEDICINE

## 2022-10-13 PROCEDURE — 27096 INJECT SACROILIAC JOINT: CPT | Performed by: PAIN MEDICINE

## 2022-10-13 PROCEDURE — 20610 PR DRAIN/INJECT LARGE JOINT/BURSA: ICD-10-PCS | Mod: LT,,, | Performed by: PAIN MEDICINE

## 2022-10-13 PROCEDURE — 25000003 PHARM REV CODE 250: Performed by: STUDENT IN AN ORGANIZED HEALTH CARE EDUCATION/TRAINING PROGRAM

## 2022-10-13 PROCEDURE — 20610 DRAIN/INJ JOINT/BURSA W/O US: CPT | Performed by: PAIN MEDICINE

## 2022-10-13 PROCEDURE — 25000003 PHARM REV CODE 250: Performed by: PAIN MEDICINE

## 2022-10-13 PROCEDURE — 25500020 PHARM REV CODE 255: Performed by: PAIN MEDICINE

## 2022-10-13 PROCEDURE — 20610 DRAIN/INJ JOINT/BURSA W/O US: CPT | Mod: LT,,, | Performed by: PAIN MEDICINE

## 2022-10-13 RX ORDER — INDOMETHACIN 25 MG/1
CAPSULE ORAL
Status: DISCONTINUED | OUTPATIENT
Start: 2022-10-13 | End: 2022-10-13 | Stop reason: HOSPADM

## 2022-10-13 RX ORDER — ALPRAZOLAM 0.5 MG/1
0.5 TABLET, ORALLY DISINTEGRATING ORAL ONCE AS NEEDED
Status: COMPLETED | OUTPATIENT
Start: 2022-10-13 | End: 2022-10-13

## 2022-10-13 RX ORDER — LIDOCAINE HYDROCHLORIDE 10 MG/ML
INJECTION INFILTRATION; PERINEURAL
Status: DISCONTINUED | OUTPATIENT
Start: 2022-10-13 | End: 2022-10-13 | Stop reason: HOSPADM

## 2022-10-13 RX ORDER — METHYLPREDNISOLONE ACETATE 40 MG/ML
INJECTION, SUSPENSION INTRA-ARTICULAR; INTRALESIONAL; INTRAMUSCULAR; SOFT TISSUE
Status: DISCONTINUED | OUTPATIENT
Start: 2022-10-13 | End: 2022-10-13 | Stop reason: HOSPADM

## 2022-10-13 RX ORDER — BUPIVACAINE HYDROCHLORIDE 2.5 MG/ML
INJECTION, SOLUTION EPIDURAL; INFILTRATION; INTRACAUDAL
Status: DISCONTINUED | OUTPATIENT
Start: 2022-10-13 | End: 2022-10-13 | Stop reason: HOSPADM

## 2022-10-13 RX ADMIN — ALPRAZOLAM 0.5 MG: 0.5 TABLET, ORALLY DISINTEGRATING ORAL at 09:10

## 2022-10-13 NOTE — DISCHARGE INSTRUCTIONS
Home Care Instructions Pain Management:    1.  DIET:    You may resume your normal diet today.    2.  BATHING:    You may shower with luke warm water.    3.  DRESSING:    You may remove your bandage today.    4.  ACTIVITY LEVEL:      You may resume your normal activities 24 hours after your procedure.    5.  MEDICATIONS:    You may resume your normal medications today.    6.  SPECIAL INSTRUCTIONS:    No heat to the injection site for 24 hours including bath or shower, heating pad, moist heat or hot tubs.    Use an ice pack to the injection site for any pain or discomfort.  Apply ice packs for 20 minute intervals as needed.    If you have received any sedatives by mouth today, you can not drive for 12 hours.    If you have received sedation through an IV, you can not drive for 24 hours.    PLEASE CALL YOUR DOCTOR FOR THE FOLLOWIN.  Redness or swelling around the injection site.  2.  Fever of 101 degrees.  3.  Drainage (pus) from the injection site.  4.  For any continuous bleeding (some dried blood over the incision is normal.)    FOR EMERGENCIES:    If any unusual problems or difficulties occur during clinic hours, call [phone] or dial [phone].    Follow up with with your physician in 2-3 weeks.

## 2022-10-13 NOTE — INTERVAL H&P NOTE
The patient was examined and no significant changes were noted from the updated H&P or last clinic note.    The risks and benefits of this procedure, including alternative therapies, were discussed with the patient.  The discussion of risks included infection, bleeding, need for additional procedures or surgery, nerve damage, paralysis, adverse medication reaction(s), stroke, and if appropriate for the procedure, death.  Questions regarding the procedure, risks, expected outcome, and possible side effects were solicited and answered to Ginger's satisfaction.  Ginger Marshall wishes to proceed with the injection or procedure as confirmed by written consent.

## 2022-10-13 NOTE — DISCHARGE SUMMARY
[facility]  Discharge Note  Short Stay     Admit Date: 10/13/2022    Discharge Date: 10/13/2022     Attending Physician: Beny Villalobos Jr, MD    Diagnoses:  There are no hospital problems to display for this patient.    Discharged Condition: Good     Hospital Course: Patient was admitted for an outpatient interventional pain management procedure and tolerated the procedure well with no complications.     Final Diagnoses: Same as principal problem.     Disposition: Home or Self Care     Follow up/Patient Instructions:        Reconciled Medications:     Medication List        CONTINUE taking these medications      amLODIPine 5 MG tablet  Commonly known as: NORVASC  Take 1 tablet (5 mg total) by mouth once daily.     atorvastatin 40 MG tablet  Commonly known as: LIPITOR  TAKE 1 TABLET BY MOUTH EVERY DAY     levothyroxine 112 MCG tablet  Commonly known as: SYNTHROID  Take 1 tablet (112 mcg total) by mouth once daily.     multivitamin capsule  Take 1 capsule by mouth once daily.             Discharge Procedure Orders (must include Diet, Follow-up, Activity)   Diet Adult Regular     No driving until:   Order Comments: If you received sedation, no driving for 12 hrs     Remove dressing in 24 hours     Notify your health care provider if you experience any of the following:  temperature >100.4     Notify your health care provider if you experience any of the following:  severe uncontrolled pain     Notify your health care provider if you experience any of the following:  redness, tenderness, or signs of infection (pain, swelling, redness, odor or green/yellow discharge around incision site)     Notify your health care provider if you experience any of the following:  difficulty breathing or increased cough     Notify your health care provider if you experience any of the following:  severe persistent headache     Notify your health care provider if you experience any of the following:  increased confusion or  weakness     Activity as tolerated       Beny Villalobos Jr, MD  Interventional Pain Medicine / Anesthesiology

## 2022-10-13 NOTE — PLAN OF CARE
Informed patient and her spouse that since she  had the COVID booster on Monday, Dr. Villalobos asked us to inform her that there is a small chance that a steroid may reduce the effectiveness of a COVID vaccine/booster, and there are no studies available to verify/deny this information. . This procedure involves using a steroid.  Patient and spouse verbalize understanding and want to proceed with procedure.

## 2022-10-13 NOTE — OP NOTE
Procedure Note    Pre-operative Diagnosis: Sacroiliac Joint Dysfunction & Greater Trochanter Bursitis  Post-operative Diagnosis: Sacroiliac Joint Dysfunction & Greater Trochanter Bursitis  Procedure Date: 10/13/2022      Procedure:  (1) Left Sacroiliac Joint Injection     (2) Left Greater Trochanter Bursa Injection    (3) Intraoperative fluoroscopy    Indications: (1) To alleviate pain and suffering and (2) reduce functional impairment.    The patients history and physical exam were reviewed. The risks (local discomfort, infection, headache, temporary or permanent weakness and/or numbness of one or both legs, temporary or permanent paraplegia, heart attack and stroke), benefits and alternatives to the procedure were discussed, and all questions were answered to the patients satisfaction. The patient agreed to proceed, and written, informed consent was verified.    Procedure in Detail: Patient was brought back to procedure room and placed in a prone position and head resting comfortably in head ring. Prior to the initiation of the procedure, the patient's identity, the site, and the nature of the procedure were verified.     The skin was prepped with chloroprep and sterile drapes were applied. The Left SI joint was identified by fluoroscopy in an oblique plane. Skin and subcutaneous tissues overyling the target area was anesthetized with up to 5 mL of Lidocaine 1% with added sodium bicarbonate.  A 22g 3 1/2 inch spinal needle was advanced under intermittent fluoroscopy until joint space was entered. There was no paresthesia with needle placement. Aspiration was negative for blood. Omnipaque 0.5 - 1.0 mL was injected confirming appropriate position and spread into the joint with local accumulation and no evidence of direct vascular uptake. Next, 2 mL containing Depomedrol 20 mg (1 mL) and Bupivacaine 0.25% (1 mL) was injected without difficulty or resistance.  The spinal needle was removed with lidocaine flush and  "bandaged placed over site of needle insertion.      Next, the Left greater trochanter bursa was identified by fluoroscopy in an AP view.  Remaining local anesthetic used to anesthetize the skin at the skin entry point and subcutaneous tissue with 25G 1.5" needle. A 22G 3.5" spinal needle was advanced under intermittent fluoroscopy until os was encountered. There was no paresthesia with needle placement, but there was reproduction of pain. Aspiration was negative for blood. Omnipaque 0.5 - 1.0 mL was injected confirming appropriate position and spread into the bursa without intravascualr uptake. Next, 3 mL containing 20 mg depomedrol with 2 mL of  0.25% Bupivacaine was injected without difficulty or resistance. Needle was removed with flush and bandaged placed over site of needle insertion.     EBL: nil    Disposition: The patient tolerated the procedure well, and there were no apparent complications. Vital signs remained stable throughout the procedure. The patient was taken to the recovery area where written discharge instructions for the procedure were given.     Follow-up: RTC as scheduled      Beny Villalobos Jr, MD  Interventional Pain Medicine / Anesthesiology    "

## 2022-11-14 ENCOUNTER — PATIENT MESSAGE (OUTPATIENT)
Dept: PAIN MEDICINE | Facility: CLINIC | Age: 67
End: 2022-11-14
Payer: MEDICARE

## 2022-11-14 ENCOUNTER — PATIENT MESSAGE (OUTPATIENT)
Dept: INTERNAL MEDICINE | Facility: CLINIC | Age: 67
End: 2022-11-14
Payer: MEDICARE

## 2022-11-14 DIAGNOSIS — M79.673 PAIN OF FOOT, UNSPECIFIED LATERALITY: Primary | ICD-10-CM

## 2022-12-22 ENCOUNTER — OFFICE VISIT (OUTPATIENT)
Dept: PODIATRY | Facility: CLINIC | Age: 67
End: 2022-12-22
Payer: MEDICARE

## 2022-12-22 VITALS
DIASTOLIC BLOOD PRESSURE: 85 MMHG | BODY MASS INDEX: 32.14 KG/M2 | SYSTOLIC BLOOD PRESSURE: 136 MMHG | HEART RATE: 71 BPM | WEIGHT: 200 LBS | HEIGHT: 66 IN

## 2022-12-22 DIAGNOSIS — M79.673 PAIN OF FOOT, UNSPECIFIED LATERALITY: ICD-10-CM

## 2022-12-22 PROCEDURE — 99203 OFFICE O/P NEW LOW 30 MIN: CPT | Mod: S$GLB,,, | Performed by: STUDENT IN AN ORGANIZED HEALTH CARE EDUCATION/TRAINING PROGRAM

## 2022-12-22 PROCEDURE — 99999 PR PBB SHADOW E&M-EST. PATIENT-LVL III: ICD-10-PCS | Mod: PBBFAC,,, | Performed by: STUDENT IN AN ORGANIZED HEALTH CARE EDUCATION/TRAINING PROGRAM

## 2022-12-22 PROCEDURE — 3008F BODY MASS INDEX DOCD: CPT | Mod: CPTII,S$GLB,, | Performed by: STUDENT IN AN ORGANIZED HEALTH CARE EDUCATION/TRAINING PROGRAM

## 2022-12-22 PROCEDURE — 3075F PR MOST RECENT SYSTOLIC BLOOD PRESS GE 130-139MM HG: ICD-10-PCS | Mod: CPTII,S$GLB,, | Performed by: STUDENT IN AN ORGANIZED HEALTH CARE EDUCATION/TRAINING PROGRAM

## 2022-12-22 PROCEDURE — 99203 PR OFFICE/OUTPT VISIT, NEW, LEVL III, 30-44 MIN: ICD-10-PCS | Mod: S$GLB,,, | Performed by: STUDENT IN AN ORGANIZED HEALTH CARE EDUCATION/TRAINING PROGRAM

## 2022-12-22 PROCEDURE — 3044F PR MOST RECENT HEMOGLOBIN A1C LEVEL <7.0%: ICD-10-PCS | Mod: CPTII,S$GLB,, | Performed by: STUDENT IN AN ORGANIZED HEALTH CARE EDUCATION/TRAINING PROGRAM

## 2022-12-22 PROCEDURE — 3288F FALL RISK ASSESSMENT DOCD: CPT | Mod: CPTII,S$GLB,, | Performed by: STUDENT IN AN ORGANIZED HEALTH CARE EDUCATION/TRAINING PROGRAM

## 2022-12-22 PROCEDURE — 3288F PR FALLS RISK ASSESSMENT DOCUMENTED: ICD-10-PCS | Mod: CPTII,S$GLB,, | Performed by: STUDENT IN AN ORGANIZED HEALTH CARE EDUCATION/TRAINING PROGRAM

## 2022-12-22 PROCEDURE — 3075F SYST BP GE 130 - 139MM HG: CPT | Mod: CPTII,S$GLB,, | Performed by: STUDENT IN AN ORGANIZED HEALTH CARE EDUCATION/TRAINING PROGRAM

## 2022-12-22 PROCEDURE — 1159F PR MEDICATION LIST DOCUMENTED IN MEDICAL RECORD: ICD-10-PCS | Mod: CPTII,S$GLB,, | Performed by: STUDENT IN AN ORGANIZED HEALTH CARE EDUCATION/TRAINING PROGRAM

## 2022-12-22 PROCEDURE — 1101F PR PT FALLS ASSESS DOC 0-1 FALLS W/OUT INJ PAST YR: ICD-10-PCS | Mod: CPTII,S$GLB,, | Performed by: STUDENT IN AN ORGANIZED HEALTH CARE EDUCATION/TRAINING PROGRAM

## 2022-12-22 PROCEDURE — 1101F PT FALLS ASSESS-DOCD LE1/YR: CPT | Mod: CPTII,S$GLB,, | Performed by: STUDENT IN AN ORGANIZED HEALTH CARE EDUCATION/TRAINING PROGRAM

## 2022-12-22 PROCEDURE — 99999 PR PBB SHADOW E&M-EST. PATIENT-LVL III: CPT | Mod: PBBFAC,,, | Performed by: STUDENT IN AN ORGANIZED HEALTH CARE EDUCATION/TRAINING PROGRAM

## 2022-12-22 PROCEDURE — 3079F DIAST BP 80-89 MM HG: CPT | Mod: CPTII,S$GLB,, | Performed by: STUDENT IN AN ORGANIZED HEALTH CARE EDUCATION/TRAINING PROGRAM

## 2022-12-22 PROCEDURE — 3044F HG A1C LEVEL LT 7.0%: CPT | Mod: CPTII,S$GLB,, | Performed by: STUDENT IN AN ORGANIZED HEALTH CARE EDUCATION/TRAINING PROGRAM

## 2022-12-22 PROCEDURE — 3008F PR BODY MASS INDEX (BMI) DOCUMENTED: ICD-10-PCS | Mod: CPTII,S$GLB,, | Performed by: STUDENT IN AN ORGANIZED HEALTH CARE EDUCATION/TRAINING PROGRAM

## 2022-12-22 PROCEDURE — 3079F PR MOST RECENT DIASTOLIC BLOOD PRESSURE 80-89 MM HG: ICD-10-PCS | Mod: CPTII,S$GLB,, | Performed by: STUDENT IN AN ORGANIZED HEALTH CARE EDUCATION/TRAINING PROGRAM

## 2022-12-22 PROCEDURE — 1159F MED LIST DOCD IN RCRD: CPT | Mod: CPTII,S$GLB,, | Performed by: STUDENT IN AN ORGANIZED HEALTH CARE EDUCATION/TRAINING PROGRAM

## 2022-12-22 NOTE — PROGRESS NOTES
Subjective:      Patient ID: Ginger Marshall is a 67 y.o. female.    Chief Complaint: Foot Pain (Foot pain bilatearl)    Ginger is a 67 y.o. female who presents to the podiatry clinic  with complaint of  bilateral foot pain. Onset of the symptoms was  unknown . Precipitating event: none known. Current symptoms include:  occasional pain all over her feet after walking for a long time . Aggravating factors: walking. Symptoms have been basically asymptomatic. Patient has had no prior foot problems. Evaluation to date:  relates was seen by a chiropractor and told she had flat feet. States she was give orthoses which did not help . Treatment to date:  orthoses, did not help . Patients rates pain 2/10 on pain scale.    Review of Systems   Constitutional: Negative for chills, decreased appetite, diaphoresis and fever.   HENT:  Negative for congestion and hearing loss.    Cardiovascular:  Negative for chest pain, claudication, leg swelling and syncope.   Respiratory:  Negative for cough and shortness of breath.    Skin:  Negative for color change, dry skin, flushing, itching, nail changes, poor wound healing and rash.   Musculoskeletal:  Negative for arthritis, back pain, joint pain and joint swelling.   Gastrointestinal:  Negative for nausea and vomiting.   Neurological:  Negative for focal weakness, numbness, paresthesias and weakness.   Psychiatric/Behavioral:  Negative for altered mental status. The patient is not nervous/anxious.          Objective:      Physical Exam  Constitutional:       General: She is not in acute distress.     Appearance: She is well-developed. She is not diaphoretic.   Cardiovascular:      Comments: Dorsalis pedis and posterior tibial pulses are within normal limits. Skin temperature is within normal limits. Toes are cool to touch and feet are warm proximally. Hair growth is within normal limits. Skin is normotrophic and without hyperpigmentation. No edema noted. No spider veins or varicosities noted,  bilaterally.   Musculoskeletal:         General: No tenderness.      Comments: Adequate joint range of motion without pain, limitation, nor crepitation to bilateral feet and ankle joints. Muscle strength is 5/5 in all groups bilaterally.    Rectus arches, mildly reduced with weightbearing    No tenderness on palpation       Lymphadenopathy:      Comments: Negative lymphangitic streaking    Skin:     General: Skin is warm and dry.      Findings: No lesion.      Comments: Skin is warm and dry, no acute signs of infection noted. No open wounds, macerations or hyperkeratotic lesions, bilaterally.     Toenails are well trimmed and of normal morphology, bilaterally.    Neurological:      Mental Status: She is alert and oriented to person, place, and time.      Sensory: No sensory deficit.      Motor: No abnormal muscle tone.      Comments: Light touch within normal limits.    Psychiatric:         Behavior: Behavior normal.         Thought Content: Thought content normal.         Judgment: Judgment normal.             Assessment:       Encounter Diagnosis   Name Primary?    Pain of foot, unspecified laterality          Plan:       Ginger was seen today for foot pain.    Diagnoses and all orders for this visit:    Pain of foot, unspecified laterality  -     Ambulatory referral/consult to Podiatry      I counseled the patient on her conditions, their implications and medical management.  Declines xray, states she is not having pain today  Recommend supportive tennis shoes at all times while ambulating, consider William Glycerin tennis shoes. Discussed may also consider OTC arch support, recommend superfeet arch support in neutral arch.   MIKE OTC NSAIDs PRN pain  May consider PT if pain worsens or does not improve  Return to clinic PRN

## 2023-01-18 ENCOUNTER — OFFICE VISIT (OUTPATIENT)
Dept: PAIN MEDICINE | Facility: CLINIC | Age: 68
End: 2023-01-18
Payer: MEDICARE

## 2023-01-18 VITALS
WEIGHT: 199.94 LBS | DIASTOLIC BLOOD PRESSURE: 82 MMHG | HEART RATE: 82 BPM | SYSTOLIC BLOOD PRESSURE: 125 MMHG | BODY MASS INDEX: 32.27 KG/M2

## 2023-01-18 DIAGNOSIS — M54.16 LUMBAR RADICULOPATHY: ICD-10-CM

## 2023-01-18 DIAGNOSIS — M53.3 SACROILIAC JOINT DYSFUNCTION OF LEFT SIDE: Primary | ICD-10-CM

## 2023-01-18 DIAGNOSIS — M54.50 CHRONIC LEFT-SIDED LOW BACK PAIN WITHOUT SCIATICA: ICD-10-CM

## 2023-01-18 DIAGNOSIS — M76.32 ILIOTIBIAL BAND SYNDROME, LEFT: ICD-10-CM

## 2023-01-18 DIAGNOSIS — G89.29 CHRONIC LEFT-SIDED LOW BACK PAIN WITHOUT SCIATICA: ICD-10-CM

## 2023-01-18 DIAGNOSIS — G57.12 MERALGIA PARESTHETICA OF LEFT SIDE: ICD-10-CM

## 2023-01-18 DIAGNOSIS — M67.952 TENDINOPATHY OF LEFT GLUTEAL REGION: ICD-10-CM

## 2023-01-18 PROCEDURE — 99999 PR PBB SHADOW E&M-EST. PATIENT-LVL III: ICD-10-PCS | Mod: PBBFAC,,, | Performed by: NURSE PRACTITIONER

## 2023-01-18 PROCEDURE — 1160F RVW MEDS BY RX/DR IN RCRD: CPT | Mod: CPTII,S$GLB,, | Performed by: NURSE PRACTITIONER

## 2023-01-18 PROCEDURE — 3079F DIAST BP 80-89 MM HG: CPT | Mod: CPTII,S$GLB,, | Performed by: NURSE PRACTITIONER

## 2023-01-18 PROCEDURE — 3008F BODY MASS INDEX DOCD: CPT | Mod: CPTII,S$GLB,, | Performed by: NURSE PRACTITIONER

## 2023-01-18 PROCEDURE — 1159F MED LIST DOCD IN RCRD: CPT | Mod: CPTII,S$GLB,, | Performed by: NURSE PRACTITIONER

## 2023-01-18 PROCEDURE — 99999 PR PBB SHADOW E&M-EST. PATIENT-LVL III: CPT | Mod: PBBFAC,,, | Performed by: NURSE PRACTITIONER

## 2023-01-18 PROCEDURE — 1159F PR MEDICATION LIST DOCUMENTED IN MEDICAL RECORD: ICD-10-PCS | Mod: CPTII,S$GLB,, | Performed by: NURSE PRACTITIONER

## 2023-01-18 PROCEDURE — 3079F PR MOST RECENT DIASTOLIC BLOOD PRESSURE 80-89 MM HG: ICD-10-PCS | Mod: CPTII,S$GLB,, | Performed by: NURSE PRACTITIONER

## 2023-01-18 PROCEDURE — 99214 OFFICE O/P EST MOD 30 MIN: CPT | Mod: S$GLB,,, | Performed by: NURSE PRACTITIONER

## 2023-01-18 PROCEDURE — 3074F SYST BP LT 130 MM HG: CPT | Mod: CPTII,S$GLB,, | Performed by: NURSE PRACTITIONER

## 2023-01-18 PROCEDURE — 1160F PR REVIEW ALL MEDS BY PRESCRIBER/CLIN PHARMACIST DOCUMENTED: ICD-10-PCS | Mod: CPTII,S$GLB,, | Performed by: NURSE PRACTITIONER

## 2023-01-18 PROCEDURE — 1125F AMNT PAIN NOTED PAIN PRSNT: CPT | Mod: CPTII,S$GLB,, | Performed by: NURSE PRACTITIONER

## 2023-01-18 PROCEDURE — 1125F PR PAIN SEVERITY QUANTIFIED, PAIN PRESENT: ICD-10-PCS | Mod: CPTII,S$GLB,, | Performed by: NURSE PRACTITIONER

## 2023-01-18 PROCEDURE — 3074F PR MOST RECENT SYSTOLIC BLOOD PRESSURE < 130 MM HG: ICD-10-PCS | Mod: CPTII,S$GLB,, | Performed by: NURSE PRACTITIONER

## 2023-01-18 PROCEDURE — 3008F PR BODY MASS INDEX (BMI) DOCUMENTED: ICD-10-PCS | Mod: CPTII,S$GLB,, | Performed by: NURSE PRACTITIONER

## 2023-01-18 PROCEDURE — 99214 PR OFFICE/OUTPT VISIT, EST, LEVL IV, 30-39 MIN: ICD-10-PCS | Mod: S$GLB,,, | Performed by: NURSE PRACTITIONER

## 2023-01-18 NOTE — PROGRESS NOTES
[facility] Established Clinic Visit    Chief Complaint:   Chief Complaint   Patient presents with    Low-back Pain     Left           History of Present Illness: Ginger Marshall is a 67 y.o. female referred by Dr. Rachel Camacho for low back and left lower extremity pain. Lateral left leg pain started about 12 years ago as numbness but about a year ago it started becoming more painful with pins and needles and a burning sensation with activity. She was seeing a chiropractor who tried manipulations and a spinal decompression device which did not help. Wakes her up at night. Somewhat better with rest.     Onset: 12 years  Location: low back left  Radiation: left leg, up to knee  Timing: constant  Quality: Aching, Dull, Burning, Tingling, Deep, Numb and Hot  Exacerbating Factors: exercise, lifting, lying down, standing for more than 10 minutes and walking for more than 20 minutes  Alleviating Factors: rest and sitting  Associated Symptoms: denies urinary incontinence, bowel incontinence, significant weight loss and significant motor weakness    Severity: Currently: 3/10   Typical Range: 3-8/10     Exacerbation: 8/10     Interval History   [id number][age]srat-old female presents today with returning left low back pain she also has intermittent numbness in her lateral left leg that she described as burning worse when standing or walking for long periods of time.  She did report improvement following her previous left SI joint injection and left GTB  that was provided to her on 10/13/2022 per Dr. Camacho.  Is the most relief she has received she has been provided a left femoral cutaneous nerve block 50% relief but for a short period time.      (10/04/2022):  Ginger Marshall returns today for follow up.  At the last clinic visit she was provide a LFCN block 50% relief.  She reports continued pain in the lateral aspect of her left leg, she does describe burning that is worse when standing or walking for long periods of time,  she did report mild improvement but continues to have numbing in that area.  She also complains of left low back pain.     Current Pain Scales:  Current: 2/10              Typical Range: 2-3/10       (09/07/2022):  Ginger Marshall returns today for follow up.  At the last clinic visit, referred to PT and scheduled LFCN block.    She is in PT and this is going well.     Currently, the back and lateral hip pain is stable.  She still has left leg numbness. She denies any significant changes since previous visit.       Current Pain Scales:  Current: 4/10              Typical Range: 3-8/10       Pain Disability Index  Family/Home Responsibilities:: 7  Recreation:: 7  Social Activity:: 5  Occupation:: 5  Sexual Behavior:: 5  Self Care:: 4  Life-Support Activities:: 4  Pain Disability Index (PDI): 37    Previous Interventions:  -10/13/2022 Left Sacroiliac Joint Injection and Left Greater Trochanter Bursa Injection   -09/08/2022 left femoral cutaneous nerve block 50% relief    Previous Therapies:  PT/OT: yes   Relevant Surgery: no   Previous Medications:   - NSAIDS: Occasionally uses Ibuprofen, Tylenol which help a little  - Muscle Relaxants:    - TCAs:   - SNRIs:   - Topicals:   - Anticonvulsants:    - Opioids:     Current Pain Medications:  1-2x/week BC Powder or Ibuprofen PM     Blood Thinners: None    Full Medication List:    Current Outpatient Medications:     amLODIPine (NORVASC) 5 MG tablet, Take 1 tablet (5 mg total) by mouth once daily., Disp: 90 tablet, Rfl: 3    atorvastatin (LIPITOR) 40 MG tablet, TAKE 1 TABLET BY MOUTH EVERY DAY, Disp: 90 tablet, Rfl: 3    levothyroxine (SYNTHROID) 112 MCG tablet, Take 1 tablet (112 mcg total) by mouth once daily., Disp: 90 tablet, Rfl: 3    multivitamin capsule, Take 1 capsule by mouth once daily., Disp: , Rfl:      Review of Systems:  Review of Systems   Musculoskeletal:  Positive for back pain.     Allergies:  Patient has no known allergies.     Medical History:   has a past  "medical history of Basal cell carcinoma and Osteopenia.    Surgical History:   has a past surgical history that includes LASIK; Tonsillectomy;  section; Colonoscopy (N/A, 2022); and injection, sacroiliac joint (Left, 10/13/2022).    Family History:  family history includes Brain cancer in her mother; Breast cancer in some other family members; Stroke in her father.    Social History:   reports that she has never smoked. She has never used smokeless tobacco. She reports current alcohol use. She reports that she does not use drugs.    Physical Exam:  /82   Pulse 82   Wt 90.7 kg (199 lb 15.3 oz)   LMP  (LMP Unknown)   BMI 32.27 kg/m²   GEN: No acute distress. Calm, comfortable  HENT: Normocephalic, atraumatic, moist mucous membranes  EYE: Anicteric sclera, non-injected.   CV: Non-diaphoretic. Regular Rate. Radial Pulses 2+.  RESP: Breathing comfortably. Chest expansion symmetric.  EXT: No clubbing, cyanosis.   SKIN: Warm, & dry to palpation. No visible rashes or lesions of exposed skin.   PSYCH: Pleasant mood and appropriate affect. Recent and remote memory intact.   GAIT: Independent, nonantalgic ambulation    Lumbar Spine Exam:       Inspection: No erythema, bruising. No surgical incisions      Palpation: (+) TTP of sacroiliac joint on left.       ROM: Not Limited in flexion, extension, lateral bending      (-) Facet loading bilaterally      (-) Straight Leg Raise bilaterally      (+) TYLOR on left      (+) Dior'sTest on left  Hip Exam:      Inspection: No gross deformity or apparent leg length discrepancy      Palpation: +TTP of left GTB.       ROM: Full internal rotation, external rotation without pain.       (-) FADIR bilaterally but with "tightness" and "stretching" on the left  Neurologic Exam:     Alert. Speech is fluent and appropriate.     Strength: 5/5 throughout bilateral lower extremities     Sensation: Abnormal to LT in LFCN distribution on the left, otherwise grossly intact to " light touch in bilateral lower extremities     Reflexes: 2+ in b/l patella, achilles     Tone: No abnormality appreciated in bilateral lower extremities     No Clonus     Downgoing toes on plantar stimulation     (-) Urias bilaterally    Imaging:  - Pt has had MRI L-spine done at outside facility but will need to bring disc.     Labs:  BMP  Lab Results   Component Value Date     08/24/2022    K 4.2 08/24/2022     08/24/2022    CO2 26 08/24/2022    BUN 13 08/24/2022    CREATININE 0.8 08/24/2022    CALCIUM 9.3 08/24/2022    ANIONGAP 7 (L) 08/24/2022    ESTGFRAFRICA >60.0 01/07/2021    EGFRNONAA >60.0 01/07/2021     Lab Results   Component Value Date    ALT 19 08/24/2022    AST 17 08/24/2022    ALKPHOS 64 08/24/2022    BILITOT 0.4 08/24/2022     Lab Results   Component Value Date     08/24/2022       Assessment:  Ginger Marshall is a 67 y.o. female with the following diagnoses based on history, exam, and imaging:    Problem List Items Addressed This Visit    None  Visit Diagnoses       Sacroiliac joint dysfunction of left side    -  Primary    Chronic left-sided low back pain without sciatica        Meralgia paresthetica of left side        Tendinopathy of left gluteal region        Iliotibial band syndrome, left        Lumbar radiculopathy                  This is a pleasant 67 y.o. lady presenting with:     - Left thigh numbness which appears consistent with Lateral Femoral Cutaneous Neuropathy/Meralgia Paresthetica: a snapshot of her L-spine MRI which shows a potential herniated disc with nerve impingement but symptoms fit less with this diagnosis and we will need to see all the images.   - Left low back pain: SIJ pain on exam  - Left lateral thigh/hip pain with GTBursitis and ITB syndrome  - Comorbidities: HTN, HLD, Osteopenia, NORBERT. BMI >30.     10/04/2022 67-year-old female that was provided a left lateral femoral cutaneous nerve block last clinic visit she reports 50% relief however continues to  experience pain in the left lateral leg described as numbness and burning that is worse when walking, standing performing ADLs.  She also complains of left low back pain, she did have SI joint pain again upon exam.  She also has pain in the left lateral hip that is likely related to GT bursitis.    01/18/[id number]-efki-old female with a history of left thigh numbness that did not get better following a left femoral cutaneous nerve block.  However she did get better from her left leg and left low back pain following an SI joint injection.  Based on history and exam today it does appear that she continues to have left SI joint pain, her last than left SI joint injection was in October 2022.  She states she had significant relief and would like to repeat this injection.  I discussed that it would be appropriate to repeat it at this time however I would not want to repeat this again in the proximally 6-7 month.    Treatment Plan:   - PT/OT/HEP:  Continue to work on SIJ dysfunction, GTB, and IT band tightness with home exercise plan.  Discussed benefits of exercise for pain.   - Procedures:  Scheduled for left SI joint injection   - Medications: No changes recommended at this time.   - Consider adding gabapentin in the future for neuropathic symptoms.   - Imaging: Reviewed.  Reviewed disc and written MRI report both will be scanned into media.  - Labs: Reviewed. Medications are appropriately dosed for current hepatorenal function.    Follow Up: RTC in 2-3 weeks      ANA CRISTINA Renae  Interventional Pain Management    Disclaimer: This note was partly generated using dictation software which may occasionally result in transcription errors.

## 2023-01-19 ENCOUNTER — TELEPHONE (OUTPATIENT)
Dept: PAIN MEDICINE | Facility: CLINIC | Age: 68
End: 2023-01-19
Payer: MEDICARE

## 2023-01-19 DIAGNOSIS — M53.3 SACROILIAC JOINT DYSFUNCTION OF LEFT SIDE: Primary | ICD-10-CM

## 2023-02-28 ENCOUNTER — TELEPHONE (OUTPATIENT)
Dept: PAIN MEDICINE | Facility: HOSPITAL | Age: 68
End: 2023-02-28
Payer: MEDICARE

## 2023-02-28 NOTE — TELEPHONE ENCOUNTER
Patient notified of 7:15 am arrival time and the following:  Please remember:  Check in at the registration desk on the first floor of the hospital. [address] [place] [address]  Please DO NOT eat or drink 8 hours prior to your arrival time for the procedure, if diabetic 6 hours prior. If you are taking medications for blood pressure, heart medications, thyroid, cholesterol; you can take them with a small sip of water.  Refrain from drinking alcohol within 24 hours prior to your procedure  You will need someone to drive you home after procedure. You cannot take taxi, Uber, or Lyft.  If you start feeling sick (fever, chills, or coughing) or start on any antibiotics please contact us at [phone] to reschedule.

## 2023-03-02 ENCOUNTER — HOSPITAL ENCOUNTER (OUTPATIENT)
Facility: HOSPITAL | Age: 68
Discharge: HOME | End: 2023-03-02
Attending: ANESTHESIOLOGY | Admitting: ANESTHESIOLOGY
Payer: MEDICARE

## 2023-03-02 VITALS
BODY MASS INDEX: 30.53 KG/M2 | DIASTOLIC BLOOD PRESSURE: 87 MMHG | HEART RATE: 60 BPM | OXYGEN SATURATION: 97 % | HEIGHT: 66 IN | RESPIRATION RATE: 16 BRPM | SYSTOLIC BLOOD PRESSURE: 147 MMHG | TEMPERATURE: 97 F | WEIGHT: 190 LBS

## 2023-03-02 DIAGNOSIS — G89.29 CHRONIC PAIN: ICD-10-CM

## 2023-03-02 DIAGNOSIS — M46.1 SACROILIITIS: Primary | ICD-10-CM

## 2023-03-02 LAB — POCT GLUCOSE: 94 MG/DL (ref 70–110)

## 2023-03-02 PROCEDURE — 27096 PR INJECTION,SACROILIAC JOINT: ICD-10-PCS | Mod: LT,,, | Performed by: ANESTHESIOLOGY

## 2023-03-02 PROCEDURE — 63600175 PHARM REV CODE 636 W HCPCS: Performed by: ANESTHESIOLOGY

## 2023-03-02 PROCEDURE — 27096 INJECT SACROILIAC JOINT: CPT | Mod: LT | Performed by: ANESTHESIOLOGY

## 2023-03-02 PROCEDURE — 25000003 PHARM REV CODE 250: Performed by: ANESTHESIOLOGY

## 2023-03-02 PROCEDURE — 25500020 PHARM REV CODE 255: Performed by: ANESTHESIOLOGY

## 2023-03-02 PROCEDURE — 27096 INJECT SACROILIAC JOINT: CPT | Mod: LT,,, | Performed by: ANESTHESIOLOGY

## 2023-03-02 RX ORDER — BUPIVACAINE HYDROCHLORIDE 2.5 MG/ML
INJECTION, SOLUTION EPIDURAL; INFILTRATION; INTRACAUDAL
Status: DISCONTINUED | OUTPATIENT
Start: 2023-03-02 | End: 2023-03-02 | Stop reason: HOSPADM

## 2023-03-02 RX ORDER — TRIAMCINOLONE ACETONIDE 40 MG/ML
INJECTION, SUSPENSION INTRA-ARTICULAR; INTRAMUSCULAR
Status: DISCONTINUED | OUTPATIENT
Start: 2023-03-02 | End: 2023-03-02 | Stop reason: HOSPADM

## 2023-03-02 RX ORDER — SODIUM CHLORIDE 9 MG/ML
500 INJECTION, SOLUTION INTRAVENOUS CONTINUOUS
Status: DISCONTINUED | OUTPATIENT
Start: 2023-03-02 | End: 2023-03-02 | Stop reason: HOSPADM

## 2023-03-02 RX ORDER — LIDOCAINE HYDROCHLORIDE 20 MG/ML
INJECTION, SOLUTION EPIDURAL; INFILTRATION; INTRACAUDAL; PERINEURAL
Status: DISCONTINUED | OUTPATIENT
Start: 2023-03-02 | End: 2023-03-02 | Stop reason: HOSPADM

## 2023-03-02 NOTE — H&P
"HPI  Ginger Marshall presents for left SI joint injection.        Past Medical History:   Diagnosis Date    Basal cell carcinoma     Osteopenia      Past Surgical History:   Procedure Laterality Date     SECTION      COLONOSCOPY N/A 2022    Procedure: COLONOSCOPY;  Surgeon: Abelardo Dean MD;  Location: [facility] ENDO ([address] [place] [address]);  Service: Endoscopy;  Laterality: N/A;  fully vaccinated, prep inst portal -ml  -covid marii-tb    INJECTION, SACROILIAC JOINT Left 10/13/2022    Procedure: Left SI Joint Injection and Left GTB Injection;  Surgeon: Beny Villalobos Jr., MD;  Location: [facility] PAIN [facility];  Service: Pain Management;  Laterality: Left;    LASIK      TONSILLECTOMY       Review of patient's allergies indicates:  No Known Allergies     PMHx, PSHx, Allergies, Medications reviewed in epic      ROS negative except pain complaints in HPI    OBJECTIVE:    /84   Pulse 87   Temp 98.1 °F (36.7 °C) (Skin)   Resp 18   Ht 5' 6" (1.676 m)   Wt 86.2 kg (190 lb)   LMP 2000   SpO2 96%   Breastfeeding No   BMI 30.67 kg/m²     PHYSICAL EXAMINATION:    GENERAL: Well appearing, in no acute distress, alert and oriented x3.  PSYCH:  Mood and affect appropriate.  SKIN: Skin color, texture, turgor normal, no rashes or lesions.  CV: RRR with palpation of the radial artery.  PULM: No evidence of respiratory difficulty, symmetric chest rise. Clear to auscultation.  NEURO: Cranial nerves grossly intact.    Plan:    Proceed with procedure as planned    Ofe Oconnor  2023    "

## 2023-03-02 NOTE — OP NOTE
Sacroiliac Joint Injection under Fluoroscopic Guidance    The procedure, risks, benefits, and options were discussed with the patient. There are no contraindications to the procedure. The patent expressed understanding and agreed to the procedure. Informed written consent was obtained prior to the start of the procedure and can be found in the patient's chart.    PATIENT NAME: Ginger Marshall   MRN: [id number]     DATE OF PROCEDURE: 03/02/2023    PROCEDURE: Left Sacroiliac Joint Injection under Fluoroscopic Guidance    PRE-OP DIAGNOSIS: Sacroiliac joint dysfunction of left side [M53.3]  Sacroiliitis     POST-OP DIAGNOSIS: Same    PHYSICIAN: Karson Farris MD    ASSISTANTS: Ofe Oconnor MD [facility]       MEDICATIONS INJECTED: Preservative-free Kenalog 40mg with 3cc of Bupivacine 0.25%     LOCAL ANESTHETIC INJECTED: Xylocaine 2%     SEDATION: None    ESTIMATED BLOOD LOSS: None    COMPLICATIONS: None    TECHNIQUE: Time-out was performed to identify the patient and procedure to be performed. With the patient laying in a prone position, the surgical area was prepped and draped in the usual sterile fashion using ChloraPrep and a fenestrated drape. The sacroiliac joint was determined under fluoroscopy guidance. Skin anesthesia was achieved by injecting Lidocaine 2% over the injection site. The sacroiliac joint was  then approached with a 22 gauge, 3.5 inch spinal quinke needle that was introduced under fluoroscopic guidance in the AP and Lateral views. Once the needle tip was in the area of the joint, and there was no blood, contrast dye Omnipaque (240mg/mL) was injected to confirm placement and there was no vascular runoff. Fluoroscopic imaging in the AP and lateral views revealed a clear outline of the joint space. 4 mL of the medication mixture listed above was injected slowly intraarticular and jack-articular. Displacement of the radio opaque contrast after injection of the medication confirmed that the  medication went into the area of the joint. The needles were removed and bleeding was nil.  A sterile dressing was applied. No specimens collected. The patient tolerated the procedure well.       The patient was monitored after the procedure in the recovery area. They were given post-procedure and discharge instructions to follow at home. The patient was discharged in a stable condition.    Ofe Oconnor MD [facility]    I reviewed and edited the fellow's note. I conducted my own interview and physical examination. I agree with the findings. I was present and supervising all critical portions of the procedure.    Karson Farris MD

## 2023-03-02 NOTE — DISCHARGE SUMMARY
Discharge Note  Short Stay      SUMMARY     Admit Date: 3/2/2023    Attending Physician: Karson Farris    Discharge Physician: Ofe Oconnro      Discharge Date: 3/2/2023 8:13 AM    Procedure(s) (LRB):  INJECTION,SACROILIAC JOINT left (Left)    Final Diagnosis: Sacroiliac joint dysfunction of left side [M53.3]    Disposition: Home or self care    Patient Instructions:   Current Discharge Medication List        CONTINUE these medications which have NOT CHANGED    Details   amLODIPine (NORVASC) 5 MG tablet Take 1 tablet (5 mg total) by mouth once daily.  Qty: 90 tablet, Refills: 3    Comments: .  Associated Diagnoses: Essential hypertension      atorvastatin (LIPITOR) 40 MG tablet TAKE 1 TABLET BY MOUTH EVERY DAY  Qty: 90 tablet, Refills: 3    Associated Diagnoses: Hyperlipidemia, unspecified hyperlipidemia type      levothyroxine (SYNTHROID) 112 MCG tablet Take 1 tablet (112 mcg total) by mouth once daily.  Qty: 90 tablet, Refills: 3    Associated Diagnoses: Hypothyroidism, unspecified type      multivitamin capsule Take 1 capsule by mouth once daily.                 Discharge Diagnosis: Sacroiliac joint dysfunction of left side [M53.3]  Condition on Discharge: Stable with no complications to procedure   Diet on Discharge: Same as before.  Activity: as per instruction sheet.  Discharge to: Home with a responsible adult.  Follow up: 2-4 weeks       Please call my office or pager at [phone] if experienced any weakness or loss of sensation, fever > 101.5, pain uncontrolled with oral medications, persistent nausea/vomiting/or diarrhea, redness or drainage from the incisions, or any other worrisome concerns. If physician on call was not reached or could not communicate with our office for any reason please go to the nearest emergency department        Karson Farris

## 2023-03-02 NOTE — DISCHARGE INSTRUCTIONS
Home Care Instructions Pain Management:    1.  DIET:    You may resume your normal diet today.    2.  BATHING:    You may shower with luke warm water.    3.  DRESSING:    You may remove your bandage today.    4.  ACTIVITY LEVEL:      You may resume your normal activities 24 hours after your procedure.    5.  MEDICATIONS:    You may resume your normal medications today.    6.  SPECIAL INSTRUCTIONS:    No heat to the injection site for 24 hours including bath or shower, heating pad, moist heat or hot tubs.    Use an ice pack to the injection site for any pain or discomfort.  Apply ice packs for 20 minute intervals as needed.    If you have received any sedatives by mouth today, you can not drive for 12 hours.    If you have received sedation through an IV, you can not drive for 24 hours.    PLEASE CALL YOUR DOCTOR FOR THE FOLLOWIN.  Redness or swelling around the injection site.  2.  Fever of 101 degrees.  3.  Drainage (pus) from the injection site.  4.  For any continuous bleeding (some dried blood over the incision is normal.)    FOR EMERGENCIES:    If any unusual problems or difficulties occur during clinic hours, call [phone] or dial [phone].    Follow up with with your physician in 2-3 weeks.

## 2023-03-02 NOTE — PLAN OF CARE
Pt denies pain or discomfort @ this time. Pt states she is ready to be discharged home @ this time.  Discharge instructions reviewed with patient, with time allotted for questions. Pt verbalizes understanding of instructions and states they have no further questions @ this time. Procedure site is clean, dry and intact. Band-aids in place. Vital signs are stable. No acute distress noted. Staff is at bedside to assist patient.

## 2023-05-18 ENCOUNTER — TELEPHONE (OUTPATIENT)
Dept: PRIMARY CARE CLINIC | Facility: CLINIC | Age: 68
End: 2023-05-18
Payer: MEDICARE

## 2023-05-18 ENCOUNTER — PATIENT MESSAGE (OUTPATIENT)
Dept: FAMILY MEDICINE | Facility: CLINIC | Age: 68
End: 2023-05-18
Payer: MEDICARE

## 2023-05-18 DIAGNOSIS — Z12.31 SCREENING MAMMOGRAM, ENCOUNTER FOR: Primary | ICD-10-CM

## 2023-05-18 NOTE — TELEPHONE ENCOUNTER
----- Message from Vicky Esteban sent at 5/18/2023 11:46 AM CDT -----  Contact: Self/[phone]  Caller is requesting to schedule their annual screening mammogram appointment. Order is not listed in Epic.  Please enter order and contact patient to schedule.  Would the patient like a call back, or a response through their [facility] portal?:   call back , Former pt of Dr Edge stated that she is over due for her Mammogram has an appt to establish care on 8/24

## 2023-05-19 ENCOUNTER — PATIENT MESSAGE (OUTPATIENT)
Dept: PAIN MEDICINE | Facility: CLINIC | Age: 68
End: 2023-05-19
Payer: MEDICARE

## 2023-05-30 ENCOUNTER — PATIENT MESSAGE (OUTPATIENT)
Dept: PAIN MEDICINE | Facility: CLINIC | Age: 68
End: 2023-05-30
Payer: MEDICARE

## 2023-05-30 ENCOUNTER — TELEPHONE (OUTPATIENT)
Dept: PAIN MEDICINE | Facility: CLINIC | Age: 68
End: 2023-05-30
Payer: MEDICARE

## 2023-05-30 DIAGNOSIS — M54.16 LUMBAR RADICULOPATHY: Primary | ICD-10-CM

## 2023-06-08 ENCOUNTER — HOSPITAL ENCOUNTER (OUTPATIENT)
Dept: RADIOLOGY | Facility: HOSPITAL | Age: 68
Discharge: HOME | End: 2023-06-08
Attending: INTERNAL MEDICINE
Payer: MEDICARE

## 2023-06-08 DIAGNOSIS — Z12.31 SCREENING MAMMOGRAM, ENCOUNTER FOR: ICD-10-CM

## 2023-06-08 PROCEDURE — 77067 MAMMO DIGITAL SCREENING BILAT WITH TOMO: ICD-10-PCS | Mod: 26,,, | Performed by: RADIOLOGY

## 2023-06-08 PROCEDURE — 77067 SCR MAMMO BI INCL CAD: CPT | Mod: 26,,, | Performed by: RADIOLOGY

## 2023-06-08 PROCEDURE — 77063 BREAST TOMOSYNTHESIS BI: CPT | Mod: 26,,, | Performed by: RADIOLOGY

## 2023-06-08 PROCEDURE — 77063 MAMMO DIGITAL SCREENING BILAT WITH TOMO: ICD-10-PCS | Mod: 26,,, | Performed by: RADIOLOGY

## 2023-06-08 PROCEDURE — 77067 SCR MAMMO BI INCL CAD: CPT | Mod: TC

## 2023-07-25 ENCOUNTER — TELEPHONE (OUTPATIENT)
Dept: PAIN MEDICINE | Facility: CLINIC | Age: 68
End: 2023-07-25
Payer: MEDICARE

## 2023-07-25 PROBLEM — M51.36 DDD (DEGENERATIVE DISC DISEASE), LUMBAR: Status: ACTIVE | Noted: 2023-07-25

## 2023-07-25 PROBLEM — M51.369 DDD (DEGENERATIVE DISC DISEASE), LUMBAR: Status: ACTIVE | Noted: 2023-07-25

## 2023-07-25 NOTE — PRE-PROCEDURE INSTRUCTIONS
The following was discussed with pt via phone and sent to pt portal. Pt verbalized understanding.    Arrival Location: [address] [place] [address]. Proceed to the second floor to check in with registration.    Arrival Time: 1:40pm    Blood Thinners:  denies  Pacemaker/ICD: denies  Recent Infections: denies  Wounds: denies  Antibiotics in the past 14 days: denies    Nothing to eat or drink starting 8 hours prior to your arrival time.  If you are diabetic, nothing to eat or drink starting 6 hours prior to your arrival time.     If you take medications for blood pressure, heart, thyroid &/or cholesterol; you may take them with a sip of water the morning of your procedure.     Refrain from drinking alcohol 24 hours prior to your procedure.     You will need a responsible adult to drive you home after your procedure. You cannot take an Uber, Lyft, taxi after post procedure without an adult to accompany you.     If you start to feel sick (fever, chills, coughing, etc) or start on any antibiotics, please contact Dr. Mendoza's office at [phone] to reschedule.

## 2023-07-25 NOTE — TELEPHONE ENCOUNTER
----- Message from Sophie Parekh sent at 7/25/2023  9:18 AM CDT -----  Type:  Appointment  Advice    Who Called: pt  Would the patient rather a call back or a response via [facility]? call  Best Call Back Number: [phone]  Additional Information: patient is calling about appointment tomorrow

## 2023-07-26 ENCOUNTER — HOSPITAL ENCOUNTER (OUTPATIENT)
Facility: HOSPITAL | Age: 68
Discharge: HOME | End: 2023-07-26
Attending: STUDENT IN AN ORGANIZED HEALTH CARE EDUCATION/TRAINING PROGRAM | Admitting: STUDENT IN AN ORGANIZED HEALTH CARE EDUCATION/TRAINING PROGRAM
Payer: MEDICARE

## 2023-07-26 VITALS
WEIGHT: 200 LBS | SYSTOLIC BLOOD PRESSURE: 109 MMHG | DIASTOLIC BLOOD PRESSURE: 64 MMHG | BODY MASS INDEX: 32.14 KG/M2 | HEIGHT: 66 IN | TEMPERATURE: 98 F | RESPIRATION RATE: 16 BRPM | OXYGEN SATURATION: 95 % | HEART RATE: 64 BPM

## 2023-07-26 DIAGNOSIS — M54.16 LUMBAR RADICULOPATHY: Primary | ICD-10-CM

## 2023-07-26 DIAGNOSIS — M51.36 DDD (DEGENERATIVE DISC DISEASE), LUMBAR: ICD-10-CM

## 2023-07-26 DIAGNOSIS — G89.29 CHRONIC PAIN: ICD-10-CM

## 2023-07-26 PROCEDURE — 64483 NJX AA&/STRD TFRM EPI L/S 1: CPT | Mod: LT,,, | Performed by: STUDENT IN AN ORGANIZED HEALTH CARE EDUCATION/TRAINING PROGRAM

## 2023-07-26 PROCEDURE — 25500020 PHARM REV CODE 255: Performed by: STUDENT IN AN ORGANIZED HEALTH CARE EDUCATION/TRAINING PROGRAM

## 2023-07-26 PROCEDURE — 64484 NJX AA&/STRD TFRM EPI L/S EA: CPT | Mod: LT,,, | Performed by: STUDENT IN AN ORGANIZED HEALTH CARE EDUCATION/TRAINING PROGRAM

## 2023-07-26 PROCEDURE — 64483 PR EPIDURAL INJ, ANES/STEROID, TRANSFORAMINAL, LUMB/SACR, SNGL LEVL: ICD-10-PCS | Mod: LT,,, | Performed by: STUDENT IN AN ORGANIZED HEALTH CARE EDUCATION/TRAINING PROGRAM

## 2023-07-26 PROCEDURE — 64484 NJX AA&/STRD TFRM EPI L/S EA: CPT | Mod: LT | Performed by: STUDENT IN AN ORGANIZED HEALTH CARE EDUCATION/TRAINING PROGRAM

## 2023-07-26 PROCEDURE — 64483 NJX AA&/STRD TFRM EPI L/S 1: CPT | Mod: LT | Performed by: STUDENT IN AN ORGANIZED HEALTH CARE EDUCATION/TRAINING PROGRAM

## 2023-07-26 PROCEDURE — 25000003 PHARM REV CODE 250: Performed by: STUDENT IN AN ORGANIZED HEALTH CARE EDUCATION/TRAINING PROGRAM

## 2023-07-26 PROCEDURE — 64484 PRA INJECT ANES/STEROID FORAMEN LUMBAR/SACRAL W IMG GUIDE ,EA ADD LEVEL: ICD-10-PCS | Mod: LT,,, | Performed by: STUDENT IN AN ORGANIZED HEALTH CARE EDUCATION/TRAINING PROGRAM

## 2023-07-26 PROCEDURE — 63600175 PHARM REV CODE 636 W HCPCS: Performed by: STUDENT IN AN ORGANIZED HEALTH CARE EDUCATION/TRAINING PROGRAM

## 2023-07-26 RX ORDER — LIDOCAINE HYDROCHLORIDE 20 MG/ML
INJECTION, SOLUTION EPIDURAL; INFILTRATION; INTRACAUDAL; PERINEURAL
Status: DISCONTINUED | OUTPATIENT
Start: 2023-07-26 | End: 2023-07-26 | Stop reason: HOSPADM

## 2023-07-26 RX ORDER — DEXAMETHASONE SODIUM PHOSPHATE 10 MG/ML
INJECTION INTRAMUSCULAR; INTRAVENOUS
Status: DISCONTINUED | OUTPATIENT
Start: 2023-07-26 | End: 2023-07-26 | Stop reason: HOSPADM

## 2023-07-26 RX ORDER — ALPRAZOLAM 0.5 MG/1
0.5 TABLET, ORALLY DISINTEGRATING ORAL ONCE
Status: COMPLETED | OUTPATIENT
Start: 2023-07-26 | End: 2023-07-26

## 2023-07-26 RX ORDER — SODIUM CHLORIDE 9 MG/ML
INJECTION, SOLUTION INTRAVENOUS CONTINUOUS
Status: DISCONTINUED | OUTPATIENT
Start: 2023-07-26 | End: 2023-07-26 | Stop reason: HOSPADM

## 2023-07-26 RX ORDER — LIDOCAINE HYDROCHLORIDE 10 MG/ML
INJECTION, SOLUTION EPIDURAL; INFILTRATION; INTRACAUDAL; PERINEURAL
Status: DISCONTINUED | OUTPATIENT
Start: 2023-07-26 | End: 2023-07-26 | Stop reason: HOSPADM

## 2023-07-26 RX ADMIN — ALPRAZOLAM 0.5 MG: 0.5 TABLET, ORALLY DISINTEGRATING ORAL at 02:07

## 2023-07-26 NOTE — PLAN OF CARE
Pt AAOx3, VSS on room air.Pt tolerated procedure well. Pt denies any pain, Dizziness or N/V. Assisted up for the first time, steady on feet. Pt Discharge instructions given and explained to patient  with verbalization of understanding all instructions. Pt denies any further questions at this time. Pt DC'd home VIA wheelchair with family.

## 2023-07-26 NOTE — DISCHARGE SUMMARY
Discharge Note  Short Stay      SUMMARY     Admit Date: 7/26/2023    Attending Physician: Glenny Mendoza DO    Discharge Physician: Abelardo Gibbs MD    Discharge Date: 7/26/2023 2:48 PM    Procedure(s) (LRB):  INJECTION, STEROID, EPIDURAL, TRANSFORAMINAL APPROACH (Left)    Final Diagnosis: Lumbar radiculopathy [M54.16]    Disposition: Home or self care    Patient Instructions:   Current Discharge Medication List        CONTINUE these medications which have NOT CHANGED    Details   amLODIPine (NORVASC) 5 MG tablet Take 1 tablet (5 mg total) by mouth once daily.  Qty: 90 tablet, Refills: 3    Comments: .  Associated Diagnoses: Essential hypertension      atorvastatin (LIPITOR) 40 MG tablet TAKE 1 TABLET BY MOUTH EVERY DAY  Qty: 90 tablet, Refills: 3    Associated Diagnoses: Hyperlipidemia, unspecified hyperlipidemia type      levothyroxine (SYNTHROID) 112 MCG tablet Take 1 tablet (112 mcg total) by mouth once daily.  Qty: 90 tablet, Refills: 3    Associated Diagnoses: Hypothyroidism, unspecified type      multivitamin capsule Take 1 capsule by mouth once daily.             Discharge Diagnosis: Lumbar radiculopathy [M54.16]  Condition on Discharge: Stable with no complications to procedure   Diet on Discharge: Same as before.  Activity: as per instruction sheet.  Discharge to: Home with a responsible adult.  Follow up: 2-4 weeks    Please call my office or pager at [phone] if experienced any weakness or loss of sensation, fever > 101.5, pain uncontrolled with oral medications, persistent nausea/vomiting/or diarrhea, redness or drainage from the incisions, or any other worrisome concerns. If physician on call was not reached or could not communicate with our office for any reason please go to the nearest emergency department

## 2023-07-26 NOTE — H&P
"HPI  Patient presenting for Procedure(s) (LRB):  INJECTION, STEROID, EPIDURAL, TRANSFORAMINAL APPROACH (Left)  L3/4 and L4/5    Patient on Anti-coagulation No    No health changes since previous encounter    Past Medical History:   Diagnosis Date    Basal cell carcinoma     Osteopenia      Past Surgical History:   Procedure Laterality Date     SECTION      COLONOSCOPY N/A 2022    Procedure: COLONOSCOPY;  Surgeon: Abelardo Dean MD;  Location: [facility] ENDO (4TH FLR);  Service: Endoscopy;  Laterality: N/A;  fully vaccinated, prep inst portal -ml  -covid marii-tb    INJECTION, SACROILIAC JOINT Left 10/13/2022    Procedure: Left SI Joint Injection and Left GTB Injection;  Surgeon: Beny Villalobos Jr., MD;  Location: [facility] PAIN MGT;  Service: Pain Management;  Laterality: Left;    INJECTION, SACROILIAC JOINT Left 3/2/2023    Procedure: INJECTION,SACROILIAC JOINT left;  Surgeon: Karson Farris MD;  Location: [facility] PAIN MGT;  Service: Pain Management;  Laterality: Left;    LASIK      TONSILLECTOMY       Review of patient's allergies indicates:  No Known Allergies   Current Facility-Administered Medications   Medication    0.9%  NaCl infusion    alprazolam ODT dissolvable tablet 0.5 mg       PMHx, PSHx, Allergies, Medications reviewed in epic    ROS negative except pain complaints in HPI    OBJECTIVE:    /63 (BP Location: Left arm, Patient Position: Sitting)   Pulse 73   Temp 98.1 °F (36.7 °C) (Temporal)   Resp 16   Ht 5' 6" (1.676 m)   Wt 90.7 kg (200 lb)   LMP 2000   SpO2 (!) 93%   Breastfeeding No   BMI 32.28 kg/m²     PHYSICAL EXAMINATION:    GENERAL: Well appearing, in no acute distress, alert and oriented x3.  PSYCH:  Mood and affect appropriate.  SKIN: Skin color, texture, turgor normal, no rashes or lesions which will impact the procedure.  CV: RRR with palpation of the radial artery.  PULM: No evidence of respiratory difficulty, symmetric chest rise. Clear to " auscultation.  NEURO: Cranial nerves grossly intact.    Plan:    Proceed with procedure as planned Procedure(s) (LRB):  INJECTION, STEROID, EPIDURAL, TRANSFORAMINAL APPROACH (Left)    Glenny Mendoza  07/26/2023

## 2023-07-26 NOTE — OP NOTE
Lumbar Transforaminal Epidural Steroid Injection under Fluoroscopic Guidance    The procedure, risks, benefits, and options were discussed with the patient. There are no contraindications to the procedure. The patent expressed understanding and agreed to the procedure. Informed written consent was obtained prior to the start of the procedure and can be found in the patient's chart.    PATIENT NAME: Ginger Marshall   MRN: [id number]     DATE OF PROCEDURE: 07/26/2023    PROCEDURE:  Left  L3/4 and L4/5 Lumbar Transforaminal Epidural Steroid Injection under Fluoroscopic Guidance    PRE-OP DIAGNOSIS: Lumbar radiculopathy [M54.16] Lumbar radiculopathy [M54.16]    POST-OP DIAGNOSIS: Same    PHYSICIAN: Glenny Mendoza DO    ASSISTANTS: Abelardo Gibbs MD Fellow    MEDICATIONS INJECTED: Preservative-free Decadron 10mg with 5cc of Lidocaine 1% MPF     LOCAL ANESTHETIC INJECTED: Xylocaine 2%     SEDATION: None    ESTIMATED BLOOD LOSS: None    COMPLICATIONS: None    TECHNIQUE: Time-out was performed to identify the patient and procedure to be performed. With the patient laying in a prone position, the surgical area was prepped and draped in the usual sterile fashion using ChloraPrep and a fenestrated drape.The levels were determined under fluoroscopy guidance. Skin anesthesia was achieved by injecting Lidocaine 2% over the injection sites. The transforaminal spaces were then approached with a 22 gauge, 5 inch spinal quinke needle that was introduced under fluoroscopic guidance in the AP and Lateral views. Once the needle tip was in the area of the transforaminal space, and there was no blood, CSF or paraesthesias, contrast dye Omnipaque (300mg/mL) was injected to confirm placement and there was no vascular runoff. Fluoroscopic imaging in the AP and lateral views revealed a clear outline of the spinal nerve with proximal spread of agent through the neural foramen into the epidural space. 3 mL of the medication mixture listed above was  injected slowly at each site. Displacement of the radio opaque contrast after injection of the medication confirmed that the medication went into the area of the transforaminal spaces. The needles were removed and bleeding was nil. A sterile dressing was applied. No specimens collected. The patient tolerated the procedure well.     The patient was monitored after the procedure in the recovery area. They were given post-procedure and discharge instructions to follow at home. The patient was discharged in a stable condition.    Abelardo Gibbs MD    I reviewed and edited the fellow's note. I conducted my own interview and physical examination. I agree with the findings. I was present and supervising all critical portions of the procedure.

## 2023-08-21 ENCOUNTER — OFFICE VISIT (OUTPATIENT)
Dept: PAIN MEDICINE | Facility: CLINIC | Age: 68
End: 2023-08-21
Payer: MEDICARE

## 2023-08-21 VITALS
BODY MASS INDEX: 32.27 KG/M2 | WEIGHT: 199.94 LBS | DIASTOLIC BLOOD PRESSURE: 77 MMHG | HEART RATE: 70 BPM | SYSTOLIC BLOOD PRESSURE: 115 MMHG

## 2023-08-21 DIAGNOSIS — M54.50 CHRONIC LEFT-SIDED LOW BACK PAIN WITHOUT SCIATICA: ICD-10-CM

## 2023-08-21 DIAGNOSIS — G89.29 CHRONIC LEFT-SIDED LOW BACK PAIN WITHOUT SCIATICA: ICD-10-CM

## 2023-08-21 DIAGNOSIS — M54.16 LUMBAR RADICULOPATHY: ICD-10-CM

## 2023-08-21 DIAGNOSIS — M76.32 ILIOTIBIAL BAND SYNDROME, LEFT: ICD-10-CM

## 2023-08-21 DIAGNOSIS — M53.3 SACROILIAC JOINT DYSFUNCTION OF LEFT SIDE: Primary | ICD-10-CM

## 2023-08-21 PROCEDURE — 99214 OFFICE O/P EST MOD 30 MIN: CPT | Mod: S$GLB,,, | Performed by: NURSE PRACTITIONER

## 2023-08-21 PROCEDURE — 1160F PR REVIEW ALL MEDS BY PRESCRIBER/CLIN PHARMACIST DOCUMENTED: ICD-10-PCS | Mod: CPTII,S$GLB,, | Performed by: NURSE PRACTITIONER

## 2023-08-21 PROCEDURE — 3074F PR MOST RECENT SYSTOLIC BLOOD PRESSURE < 130 MM HG: ICD-10-PCS | Mod: CPTII,S$GLB,, | Performed by: NURSE PRACTITIONER

## 2023-08-21 PROCEDURE — 1125F PR PAIN SEVERITY QUANTIFIED, PAIN PRESENT: ICD-10-PCS | Mod: CPTII,S$GLB,, | Performed by: NURSE PRACTITIONER

## 2023-08-21 PROCEDURE — 3074F SYST BP LT 130 MM HG: CPT | Mod: CPTII,S$GLB,, | Performed by: NURSE PRACTITIONER

## 2023-08-21 PROCEDURE — 99999 PR PBB SHADOW E&M-EST. PATIENT-LVL III: CPT | Mod: PBBFAC,,, | Performed by: NURSE PRACTITIONER

## 2023-08-21 PROCEDURE — 1125F AMNT PAIN NOTED PAIN PRSNT: CPT | Mod: CPTII,S$GLB,, | Performed by: NURSE PRACTITIONER

## 2023-08-21 PROCEDURE — 1160F RVW MEDS BY RX/DR IN RCRD: CPT | Mod: CPTII,S$GLB,, | Performed by: NURSE PRACTITIONER

## 2023-08-21 PROCEDURE — 1159F MED LIST DOCD IN RCRD: CPT | Mod: CPTII,S$GLB,, | Performed by: NURSE PRACTITIONER

## 2023-08-21 PROCEDURE — 3008F BODY MASS INDEX DOCD: CPT | Mod: CPTII,S$GLB,, | Performed by: NURSE PRACTITIONER

## 2023-08-21 PROCEDURE — 99999 PR PBB SHADOW E&M-EST. PATIENT-LVL III: ICD-10-PCS | Mod: PBBFAC,,, | Performed by: NURSE PRACTITIONER

## 2023-08-21 PROCEDURE — 3008F PR BODY MASS INDEX (BMI) DOCUMENTED: ICD-10-PCS | Mod: CPTII,S$GLB,, | Performed by: NURSE PRACTITIONER

## 2023-08-21 PROCEDURE — 3078F DIAST BP <80 MM HG: CPT | Mod: CPTII,S$GLB,, | Performed by: NURSE PRACTITIONER

## 2023-08-21 PROCEDURE — 1159F PR MEDICATION LIST DOCUMENTED IN MEDICAL RECORD: ICD-10-PCS | Mod: CPTII,S$GLB,, | Performed by: NURSE PRACTITIONER

## 2023-08-21 PROCEDURE — 99214 PR OFFICE/OUTPT VISIT, EST, LEVL IV, 30-39 MIN: ICD-10-PCS | Mod: S$GLB,,, | Performed by: NURSE PRACTITIONER

## 2023-08-21 PROCEDURE — 3078F PR MOST RECENT DIASTOLIC BLOOD PRESSURE < 80 MM HG: ICD-10-PCS | Mod: CPTII,S$GLB,, | Performed by: NURSE PRACTITIONER

## 2023-08-21 RX ORDER — METHYLPREDNISOLONE 4 MG/1
TABLET ORAL
Qty: 21 EACH | Refills: 0 | Status: SHIPPED | OUTPATIENT
Start: 2023-08-21 | End: 2023-09-11

## 2023-08-21 NOTE — PROGRESS NOTES
[facility] Established Clinic Visit    Chief Complaint:   Chief Complaint   Patient presents with    Low-back Pain       Interval History (08/21/2023):  Ginger Marshall returns today for follow up.  She is s/p a Left L3/4 and L4/5 Lumbar Transforaminal Epidural Steroid Injection that provided her 30% relief. She continues to have pain in her left low back particular when active. She reports better relief following her previous Left SI joint. She denies any B/B dysfunction, denies any new pain, denies any profound weakness.     Current Pain Scales:  Current: 5/10              Typical Range: 5-6/10     History of Present Illness: Ginger Marshall is a 68 y.o. female referred by Dr. Rachel Camacho for low back and left lower extremity pain. Lateral left leg pain started about 12 years ago as numbness but about a year ago it started becoming more painful with pins and needles and a burning sensation with activity. She was seeing a chiropractor who tried manipulations and a spinal decompression device which did not help. Wakes her up at night. Somewhat better with rest.     Onset: 12 years  Location: low back left  Radiation: left leg, up to knee  Timing: constant  Quality: Aching, Dull, Burning, Tingling, Deep, Numb and Hot  Exacerbating Factors: exercise, lifting, lying down, standing for more than 10 minutes and walking for more than 20 minutes  Alleviating Factors: rest and sitting  Associated Symptoms: denies urinary incontinence, bowel incontinence, significant weight loss and significant motor weakness    Severity: Currently: 3/10   Typical Range: 3-8/10     Exacerbation: 8/10     Interval History   01/18/[id number]-itqb-old female presents today with returning left low back pain she also has intermittent numbness in her lateral left leg that she described as burning worse when standing or walking for long periods of time.  She did report improvement following her previous left SI joint injection and left GTB  that  was provided to her on 10/13/2022 per Dr. Camacho.  Is the most relief she has received she has been provided a left femoral cutaneous nerve block 50% relief but for a short period time.      (10/04/2022):  Ginger Marshall returns today for follow up.  At the last clinic visit she was provide a LFCN block 50% relief.  She reports continued pain in the lateral aspect of her left leg, she does describe burning that is worse when standing or walking for long periods of time, she did report mild improvement but continues to have numbing in that area.  She also complains of left low back pain.     Current Pain Scales:  Current: 2/10              Typical Range: 2-3/10       (09/07/2022):  Ginger Marshall returns today for follow up.  At the last clinic visit, referred to PT and scheduled LFCN block.    She is in PT and this is going well.     Currently, the back and lateral hip pain is stable.  She still has left leg numbness. She denies any significant changes since previous visit.       Current Pain Scales:  Current: 4/10              Typical Range: 3-8/10       Pain Disability Index  Family/Home Responsibilities:: 5  Recreation:: 6  Social Activity:: 6  Occupation:: 6  Sexual Behavior:: 6  Self Care:: 5  Life-Support Activities:: 1  Pain Disability Index (PDI): 35    Previous Interventions:  -07/26/2023 Left  L3/4 and L4/5 Lumbar Transforaminal Epidural Steroid Injection 30%   -10/13/2022 Left Sacroiliac Joint Injection and Left Greater Trochanter Bursa Injection   -09/08/2022 left femoral cutaneous nerve block 50% relief    Previous Therapies:  PT/OT: yes   Relevant Surgery: no   Previous Medications:   - NSAIDS: Occasionally uses Ibuprofen, Tylenol which help a little  - Muscle Relaxants:    - TCAs:   - SNRIs:   - Topicals:   - Anticonvulsants:    - Opioids:     Current Pain Medications:  1-2x/week BC Powder or Ibuprofen PM     Blood Thinners: None    Full Medication List:    Current Outpatient Medications:     amLODIPine  (NORVASC) 5 MG tablet, Take 1 tablet (5 mg total) by mouth once daily., Disp: 90 tablet, Rfl: 3    atorvastatin (LIPITOR) 40 MG tablet, TAKE 1 TABLET BY MOUTH EVERY DAY, Disp: 90 tablet, Rfl: 3    levothyroxine (SYNTHROID) 112 MCG tablet, Take 1 tablet (112 mcg total) by mouth once daily., Disp: 90 tablet, Rfl: 3    multivitamin capsule, Take 1 capsule by mouth once daily., Disp: , Rfl:      Review of Systems:  Review of Systems   Musculoskeletal:  Positive for back pain.       Allergies:  Patient has no known allergies.     Medical History:   has a past medical history of Basal cell carcinoma and Osteopenia.    Surgical History:   has a past surgical history that includes LASIK; Tonsillectomy;  section; Colonoscopy (N/A, 2022); injection, sacroiliac joint (Left, 10/13/2022); injection, sacroiliac joint (Left, 3/2/2023); and Transforaminal epidural injection of steroid (Left, 2023).    Family History:  family history includes Brain cancer in her mother; Breast cancer in some other family members; Stroke in her father.    Social History:   reports that she has never smoked. She has never used smokeless tobacco. She reports current alcohol use. She reports that she does not use drugs.    Physical Exam:  /77   Pulse 70   Wt 90.7 kg (199 lb 15.3 oz)   LMP 2000   BMI 32.27 kg/m²   GEN: No acute distress. Calm, comfortable  HENT: Normocephalic, atraumatic, moist mucous membranes  EYE: Anicteric sclera, non-injected.   CV: Non-diaphoretic. Regular Rate. Radial Pulses 2+.  RESP: Breathing comfortably. Chest expansion symmetric.  EXT: No clubbing, cyanosis.   SKIN: Warm, & dry to palpation. No visible rashes or lesions of exposed skin.   PSYCH: Pleasant mood and appropriate affect. Recent and remote memory intact.   GAIT: Independent, nonantalgic ambulation    Lumbar Spine Exam:       Inspection: No erythema, bruising. No surgical incisions      Palpation: (+) TTP of sacroiliac joint on  "left.       ROM: Not Limited in flexion, extension, lateral bending      (-) Facet loading bilaterally      (-) Straight Leg Raise bilaterally      (+) TYLOR on left      (+) Dior'sTest on left  Hip Exam:      Inspection: No gross deformity or apparent leg length discrepancy      Palpation: +TTP of left GTB.       ROM: Full internal rotation, external rotation without pain.       (-) FADIR bilaterally but with "tightness" and "stretching" on the left  Neurologic Exam:     Alert. Speech is fluent and appropriate.     Strength: 5/5 throughout bilateral lower extremities     Sensation: Abnormal to LT in LFCN distribution on the left, otherwise grossly intact to light touch in bilateral lower extremities     Reflexes: 2+ in b/l patella, achilles     Tone: No abnormality appreciated in bilateral lower extremities     No Clonus     Downgoing toes on plantar stimulation     (-) Urias bilaterally    Imaging:  - Pt has had MRI L-spine done at outside facility but will need to bring disc.     Labs:  BMP  Lab Results   Component Value Date     08/24/2022    K 4.2 08/24/2022     08/24/2022    CO2 26 08/24/2022    BUN 13 08/24/2022    CREATININE 0.8 08/24/2022    CALCIUM 9.3 08/24/2022    ANIONGAP 7 (L) 08/24/2022    ESTGFRAFRICA >60.0 01/07/2021    EGFRNONAA >60.0 01/07/2021     Lab Results   Component Value Date    ALT 19 08/24/2022    AST 17 08/24/2022    ALKPHOS 64 08/24/2022    BILITOT 0.4 08/24/2022     Lab Results   Component Value Date     08/24/2022       Assessment:  Ginger Marshall is a 68 y.o. female with the following diagnoses based on history, exam, and imaging:    Problem List Items Addressed This Visit          Neuro    Lumbar radiculopathy    Relevant Medications    methylPREDNISolone (MEDROL DOSEPACK) 4 mg tablet     Other Visit Diagnoses       Sacroiliac joint dysfunction of left side    -  Primary    Relevant Medications    methylPREDNISolone (MEDROL DOSEPACK) 4 mg tablet    Chronic " left-sided low back pain without sciatica        Relevant Medications    methylPREDNISolone (MEDROL DOSEPACK) 4 mg tablet    Iliotibial band syndrome, left                    This is a pleasant 68 y.o. lady presenting with:     - Left thigh numbness which appears consistent with Lateral Femoral Cutaneous Neuropathy/Meralgia Paresthetica: a snapshot of her L-spine MRI which shows a potential herniated disc with nerve impingement but symptoms fit less with this diagnosis and we will need to see all the images.   - Left low back pain: SIJ pain on exam  - Left lateral thigh/hip pain with GTBursitis and ITB syndrome  - Comorbidities: HTN, HLD, Osteopenia, NORBERT. BMI >30.     10/04/2022 67-year-old female that was provided a left lateral femoral cutaneous nerve block last clinic visit she reports 50% relief however continues to experience pain in the left lateral leg described as numbness and burning that is worse when walking, standing performing ADLs.  She also complains of left low back pain, she did have SI joint pain again upon exam.  She also has pain in the left lateral hip that is likely related to GT bursitis.    01/18/[id number]-unqd-old female with a history of left thigh numbness that did not get better following a left femoral cutaneous nerve block.  However she did get better from her left leg and left low back pain following an SI joint injection.  Based on history and exam today it does appear that she continues to have left SI joint pain, her last than left SI joint injection was in October 2022.  She states she had significant relief and would like to repeat this injection.  I discussed that it would be appropriate to repeat it at this time however I would not want to repeat this again in the proximally 6-7 month.    8/21/2023- Ginger Marshall is a 68 y.o. female who  has a past medical history of Basal cell carcinoma and Osteopenia.  By history and examination this patient has chronic low back pain with  radiculopathy.  The underlying cause cause is facet arthritis, degenerative disc disease, foraminal stenosis, and sacroiliitis.  Pathology is confirmed by imaging.  We discussed the underlying diagnoses and multiple treatment options including non-opioid medications, interventional procedures, physical therapy, home exercise, and weight loss.  The risks and benefits of each treatment option were discussed and all questions were answered.        Treatment Plan:   - PT/OT/HEP:  Continue to work on SIJ dysfunction, GTB, and IT band tightness with home exercise plan.  Discussed benefits of exercise for pain.   - Procedures:  Consider repeating Left SI joint injection.   - Medications: Rx of Medrol dose ranjan today to use for trip [place] the country    - Consider adding gabapentin in the future for neuropathic symptoms.   - Imaging: Reviewed.  Reviewed disc and written MRI report both will be scanned into media.  - Labs: Reviewed. Medications are appropriately dosed for current hepatorenal function.    Follow Up: 8-10 weeks       ANA CRISTINA Renae  Interventional Pain Management    Disclaimer: This note was partly generated using dictation software which may occasionally result in transcription errors.

## 2023-08-29 ENCOUNTER — OFFICE VISIT (OUTPATIENT)
Dept: PRIMARY CARE CLINIC | Facility: CLINIC | Age: 68
End: 2023-08-29
Payer: MEDICARE

## 2023-08-29 VITALS
HEART RATE: 78 BPM | WEIGHT: 203.25 LBS | DIASTOLIC BLOOD PRESSURE: 78 MMHG | SYSTOLIC BLOOD PRESSURE: 126 MMHG | BODY MASS INDEX: 32.66 KG/M2 | HEIGHT: 66 IN | OXYGEN SATURATION: 97 %

## 2023-08-29 DIAGNOSIS — E78.5 HYPERLIPIDEMIA, UNSPECIFIED HYPERLIPIDEMIA TYPE: ICD-10-CM

## 2023-08-29 DIAGNOSIS — Z00.00 WELLNESS EXAMINATION: Primary | ICD-10-CM

## 2023-08-29 DIAGNOSIS — E03.9 HYPOTHYROIDISM, UNSPECIFIED TYPE: ICD-10-CM

## 2023-08-29 DIAGNOSIS — R73.03 PREDIABETES: ICD-10-CM

## 2023-08-29 DIAGNOSIS — I10 ESSENTIAL HYPERTENSION: ICD-10-CM

## 2023-08-29 PROCEDURE — 99397 PER PM REEVAL EST PAT 65+ YR: CPT | Mod: S$GLB,,, | Performed by: INTERNAL MEDICINE

## 2023-08-29 PROCEDURE — 3074F SYST BP LT 130 MM HG: CPT | Mod: CPTII,S$GLB,, | Performed by: INTERNAL MEDICINE

## 2023-08-29 PROCEDURE — 3078F DIAST BP <80 MM HG: CPT | Mod: CPTII,S$GLB,, | Performed by: INTERNAL MEDICINE

## 2023-08-29 PROCEDURE — 1159F PR MEDICATION LIST DOCUMENTED IN MEDICAL RECORD: ICD-10-PCS | Mod: CPTII,S$GLB,, | Performed by: INTERNAL MEDICINE

## 2023-08-29 PROCEDURE — 1159F MED LIST DOCD IN RCRD: CPT | Mod: CPTII,S$GLB,, | Performed by: INTERNAL MEDICINE

## 2023-08-29 PROCEDURE — 3008F PR BODY MASS INDEX (BMI) DOCUMENTED: ICD-10-PCS | Mod: CPTII,S$GLB,, | Performed by: INTERNAL MEDICINE

## 2023-08-29 PROCEDURE — 99397 PR PREVENTIVE VISIT,EST,65 & OVER: ICD-10-PCS | Mod: S$GLB,,, | Performed by: INTERNAL MEDICINE

## 2023-08-29 PROCEDURE — 3074F PR MOST RECENT SYSTOLIC BLOOD PRESSURE < 130 MM HG: ICD-10-PCS | Mod: CPTII,S$GLB,, | Performed by: INTERNAL MEDICINE

## 2023-08-29 PROCEDURE — 3078F PR MOST RECENT DIASTOLIC BLOOD PRESSURE < 80 MM HG: ICD-10-PCS | Mod: CPTII,S$GLB,, | Performed by: INTERNAL MEDICINE

## 2023-08-29 PROCEDURE — 99999 PR PBB SHADOW E&M-EST. PATIENT-LVL III: CPT | Mod: PBBFAC,,, | Performed by: INTERNAL MEDICINE

## 2023-08-29 PROCEDURE — 99999 PR PBB SHADOW E&M-EST. PATIENT-LVL III: ICD-10-PCS | Mod: PBBFAC,,, | Performed by: INTERNAL MEDICINE

## 2023-08-29 PROCEDURE — 3008F BODY MASS INDEX DOCD: CPT | Mod: CPTII,S$GLB,, | Performed by: INTERNAL MEDICINE

## 2023-08-29 RX ORDER — AMLODIPINE BESYLATE 5 MG/1
5 TABLET ORAL DAILY
Qty: 90 TABLET | Refills: 3 | Status: SHIPPED | OUTPATIENT
Start: 2023-08-29

## 2023-08-29 RX ORDER — ATORVASTATIN CALCIUM 40 MG/1
40 TABLET, FILM COATED ORAL DAILY
Qty: 90 TABLET | Refills: 3 | Status: SHIPPED | OUTPATIENT
Start: 2023-08-29

## 2023-08-29 RX ORDER — LEVOTHYROXINE SODIUM 112 UG/1
112 TABLET ORAL DAILY
Qty: 90 TABLET | Refills: 3 | Status: SHIPPED | OUTPATIENT
Start: 2023-08-29

## 2023-08-29 NOTE — PROGRESS NOTES
[facility] Note    Chief Complaint      Chief Complaint   Patient presents with    Establish Care     History of Present Illness      Ginger Marshall is a 68 y.o. female who presents today for chief complaint annual wellness. Patient is new to me.    PCP: Deysi Zamorano MD  Patient comes to appointment alone.     HPI   Prev saw Dr Camacho lastannual 8.  She is due for labs   Mammogram 6.23  Cscope 2022   Bone Density: DEXA 8.22  Gyn Sammi pap 8320    Osteopenia low fracture risk - she takes melania and vit d   Lumbar radiculopathy sees pain mgmt s/p KIKI and SI joint injxn, she just completed medrol dose pack   HTN at goal on amlodipine 5  HLD on lipitor 40  HoTh on levothyroxine 112, she has her goland, no nodules or imaging, feels good   H/o preDM but last A1c 5.6  Has seen pos Aurelia   Has seen sleep med - she has a cpap but does not like, but she is using nightly regardless   Has see brain Gaona for Mohs for basal cell in 8.20  Never smoker     She is traveling to [facility] next month   Active Problem List with Overview Notes    Diagnosis Date Noted    DDD (degenerative disc disease), lumbar 2023    Lumbar radiculopathy 2022    NORBERT (obstructive sleep apnea)     Risk factors for obstructive sleep apnea     Essential hypertension 10/02/2020    Prediabetes 2020    Osteopenia of lumbar spine 2020    Hyperlipidemia 2020    Hypothyroidism 2020       Health Maintenance   Topic Date Due    Mammogram  2024    DEXA Scan  2025    TETANUS VACCINE  2026    Lipid Panel  2027    Colorectal Cancer Screening  2029    Hepatitis C Screening  Completed    Shingles Vaccine  Completed       Past Medical History:   Diagnosis Date    Basal cell carcinoma     Osteopenia        Past Surgical History:   Procedure Laterality Date    ADENOIDECTOMY  ?    Had tonsils removed, not sure about adenoids.     SECTION      COLONOSCOPY N/A 2022     Procedure: COLONOSCOPY;  Surgeon: Abelardo Dean MD;  [facility]  Service: Endoscopy;  Laterality: N/A;  fully vaccinated, prep inst portal -ml  1/21-covid marii-tb    EYE SURGERY  LASIK/ RETINAL TEAR 2015    Cataract surgery    INJECTION, SACROILIAC JOINT Left 10/13/2022    Procedure: Left SI Joint Injection and Left GTB Injection;  Surgeon: Beny Villalobos Jr., MD;  Location: [facility] PAIN MGT;  Service: Pain Management;  Laterality: Left;    INJECTION, SACROILIAC JOINT Left 03/02/2023    Procedure: INJECTION,SACROILIAC JOINT left;  Surgeon: Karson Farris MD;  Location: [facility] PAIN MGT;  Service: Pain Management;  Laterality: Left;    LASIK      TONSILLECTOMY      TRANSFORAMINAL EPIDURAL INJECTION OF STEROID Left 07/26/2023    Procedure: INJECTION, STEROID, EPIDURAL, TRANSFORAMINAL APPROACH;  Surgeon: Glenny Mendoza DO;  Location: [facility] PAIN MANAGEMENT;  Service: Pain Management;  Laterality: Left;       family history includes Arthritis in her sister and sister; Brain cancer in her mother; Breast cancer in some other family members; Cancer in her mother; Early death in her mother; Hyperlipidemia in her sister and sister; Stroke in her father, paternal aunt, and sister.    Social History     Tobacco Use    Smoking status: Never     Passive exposure: Never    Smokeless tobacco: Never   Substance Use Topics    Alcohol use: Yes     Alcohol/week: 4.0 standard drinks of alcohol     Types: 2 Cans of beer, 2 Drinks containing 0.5 oz of alcohol per week     Comment: Social drinker    Drug use: Never       Review of Systems   Constitutional:  Negative for chills, fever, malaise/fatigue and weight loss.   Respiratory:  Negative for cough, sputum production, shortness of breath and wheezing.    Cardiovascular:  Negative for chest pain, palpitations, orthopnea and leg swelling.   Gastrointestinal:  Negative for constipation, diarrhea, nausea and vomiting.   Genitourinary:  Negative for dysuria,  "frequency, hematuria and urgency.        Outpatient Encounter Medications as of 8/29/2023   Medication Sig Dispense Refill    methylPREDNISolone (MEDROL DOSEPACK) 4 mg tablet use as directed 21 each 0    multivitamin capsule Take 1 capsule by mouth once daily.      [DISCONTINUED] amLODIPine (NORVASC) 5 MG tablet Take 1 tablet (5 mg total) by mouth once daily. 90 tablet 3    [DISCONTINUED] atorvastatin (LIPITOR) 40 MG tablet TAKE 1 TABLET BY MOUTH EVERY DAY 90 tablet 3    [DISCONTINUED] levothyroxine (SYNTHROID) 112 MCG tablet Take 1 tablet (112 mcg total) by mouth once daily. 90 tablet 3    amLODIPine (NORVASC) 5 MG tablet Take 1 tablet (5 mg total) by mouth once daily. 90 tablet 3    atorvastatin (LIPITOR) 40 MG tablet Take 1 tablet (40 mg total) by mouth once daily. 90 tablet 3    levothyroxine (SYNTHROID) 112 MCG tablet Take 1 tablet (112 mcg total) by mouth once daily. 90 tablet 3     No facility-administered encounter medications on file as of 8/29/2023.        Review of patient's allergies indicates:  No Known Allergies        Physical Exam      Vital Signs  Pulse: 78  SpO2: 97 %  BP: 126/78  BP Location: Left arm  Patient Position: Sitting  Height and Weight  Height: 5' 6" (167.6 cm)  Weight: 92.2 kg (203 lb 4.2 oz)  BSA (Calculated - sq m): 2.07 sq meters  BMI (Calculated): 32.8  Weight in (lb) to have BMI = 25: 154.6]    Physical Exam  Vitals reviewed.   Constitutional:       General: She is not in acute distress.     Appearance: She is well-developed. She is not diaphoretic.   HENT:      Head: Normocephalic and atraumatic.      Right Ear: External ear normal.      Left Ear: External ear normal.      Nose: Nose normal.   Eyes:      General:         Right eye: No discharge.         Left eye: No discharge.      Conjunctiva/sclera: Conjunctivae normal.   Cardiovascular:      Rate and Rhythm: Normal rate and regular rhythm.      Heart sounds: Normal heart sounds.   Pulmonary:      Effort: Pulmonary effort is " "normal. No respiratory distress.      Breath sounds: Normal breath sounds.   Musculoskeletal:         General: Normal range of motion.      Cervical back: Normal range of motion.   Skin:     Coloration: Skin is not pale.      Findings: No rash.   Neurological:      Mental Status: She is alert and oriented to person, place, and time.   Psychiatric:         Behavior: Behavior normal.         Thought Content: Thought content normal.          Laboratory:  CBC:  No results for input(s): "WBC", "RBC", "HGB", "HCT", "PLT", "MCV", "MCH", "MCHC" in the last 2160 hours.  CMP:  No results for input(s): "GLU", "CALCIUM", "ALBUMIN", "PROT", "NA", "K", "CO2", "CL", "BUN", "ALKPHOS", "ALT", "AST", "BILITOT" in the last 2160 hours.    Invalid input(s): "CREATININ"  URINALYSIS:  No results for input(s): "COLORU", "CLARITYU", "SPECGRAV", "PHUR", "PROTEINUA", "GLUCOSEU", "BILIRUBINCON", "BLOODU", "WBCU", "RBCU", "BACTERIA", "MUCUS", "NITRITE", "LEUKOCYTESUR", "UROBILINOGEN", "HYALINECASTS" in the last 2160 hours.   LIPIDS:  No results for input(s): "TSH", "HDL", "CHOL", "TRIG", "LDLCALC", "CHOLHDL", "NONHDLCHOL", "TOTALCHOLEST" in the last 2160 hours.  TSH:  No results for input(s): "TSH" in the last 2160 hours.  A1C:  No results for input(s): "HGBA1C" in the last 2160 hours.        Assessment/Plan     Ginger Marshall is a 68 y.o.female with:    1. Wellness examination    2. Hypothyroidism, unspecified type  -     T4, Free; Future; Expected date: 08/29/2023  -     TSH; Future; Expected date: 08/29/2023  -     levothyroxine (SYNTHROID) 112 MCG tablet; Take 1 tablet (112 mcg total) by mouth once daily.  Dispense: 90 tablet; Refill: 3    3. Hyperlipidemia, unspecified hyperlipidemia type  -     Lipid Panel; Future; Expected date: 08/29/2023  -     atorvastatin (LIPITOR) 40 MG tablet; Take 1 tablet (40 mg total) by mouth once daily.  Dispense: 90 tablet; Refill: 3    4. Essential hypertension  -     Comprehensive Metabolic Panel; Future; " Expected date: 08/29/2023  -     CBC Without Differential; Future; Expected date: 08/29/2023  -     amLODIPine (NORVASC) 5 MG tablet; Take 1 tablet (5 mg total) by mouth once daily.  Dispense: 90 tablet; Refill: 3    5. Prediabetes  -     Hemoglobin A1C; Future; Expected date: 08/29/2023         Use of the [employer] Patient Portal discussed and encouraged during today's visit  -Continue current medications and maintain follow up with specialists.  Return to clinic in 1 year prn.  No future appointments.    Deysi Zamorano MD  8/29/2023 11:57 AM    Primary Care Internal Medicine

## 2023-09-08 ENCOUNTER — PATIENT MESSAGE (OUTPATIENT)
Dept: PRIMARY CARE CLINIC | Facility: CLINIC | Age: 68
End: 2023-09-08
Payer: MEDICARE

## 2023-09-08 DIAGNOSIS — T75.3XXD MOTION SICKNESS, SUBSEQUENT ENCOUNTER: Primary | ICD-10-CM

## 2023-09-10 RX ORDER — SCOLOPAMINE TRANSDERMAL SYSTEM 1 MG/1
1 PATCH, EXTENDED RELEASE TRANSDERMAL
Status: CANCELLED | OUTPATIENT
Start: 2023-09-10

## 2023-09-11 ENCOUNTER — LAB VISIT (OUTPATIENT)
Dept: LAB | Facility: HOSPITAL | Age: 68
End: 2023-09-11
Attending: INTERNAL MEDICINE
Payer: MEDICARE

## 2023-09-11 DIAGNOSIS — R73.03 PREDIABETES: ICD-10-CM

## 2023-09-11 DIAGNOSIS — E78.5 HYPERLIPIDEMIA, UNSPECIFIED HYPERLIPIDEMIA TYPE: ICD-10-CM

## 2023-09-11 DIAGNOSIS — E03.9 HYPOTHYROIDISM, UNSPECIFIED TYPE: ICD-10-CM

## 2023-09-11 DIAGNOSIS — I10 ESSENTIAL HYPERTENSION: ICD-10-CM

## 2023-09-11 LAB
ALBUMIN SERPL BCP-MCNC: 4.3 G/DL (ref 3.5–5.2)
ALP SERPL-CCNC: 57 U/L (ref 55–135)
ALT SERPL W/O P-5'-P-CCNC: 21 U/L (ref 10–44)
ANION GAP SERPL CALC-SCNC: 10 MMOL/L (ref 8–16)
AST SERPL-CCNC: 23 U/L (ref 10–40)
BILIRUB SERPL-MCNC: 0.4 MG/DL (ref 0.1–1)
BUN SERPL-MCNC: 14 MG/DL (ref 8–23)
CALCIUM SERPL-MCNC: 9.1 MG/DL (ref 8.7–10.5)
CHLORIDE SERPL-SCNC: 106 MMOL/L (ref 95–110)
CHOLEST SERPL-MCNC: 208 MG/DL (ref 120–199)
CHOLEST/HDLC SERPL: 3.5 {RATIO} (ref 2–5)
CO2 SERPL-SCNC: 25 MMOL/L (ref 23–29)
CREAT SERPL-MCNC: 0.8 MG/DL (ref 0.5–1.4)
ERYTHROCYTE [DISTWIDTH] IN BLOOD BY AUTOMATED COUNT: 13 % (ref 11.5–14.5)
EST. GFR  (NO RACE VARIABLE): >60 ML/MIN/1.73 M^2
ESTIMATED AVG GLUCOSE: 120 MG/DL (ref 68–131)
GLUCOSE SERPL-MCNC: 97 MG/DL (ref 70–110)
HBA1C MFR BLD: 5.8 % (ref 4–5.6)
HCT VFR BLD AUTO: 41.9 % (ref 37–48.5)
HDLC SERPL-MCNC: 59 MG/DL (ref 40–75)
HDLC SERPL: 28.4 % (ref 20–50)
HGB BLD-MCNC: 14 G/DL (ref 12–16)
LDLC SERPL CALC-MCNC: 125.4 MG/DL (ref 63–159)
MCH RBC QN AUTO: 30 PG (ref 27–31)
MCHC RBC AUTO-ENTMCNC: 33.4 G/DL (ref 32–36)
MCV RBC AUTO: 90 FL (ref 82–98)
NONHDLC SERPL-MCNC: 149 MG/DL
PLATELET # BLD AUTO: 243 K/UL (ref 150–450)
PMV BLD AUTO: 11.5 FL (ref 9.2–12.9)
POTASSIUM SERPL-SCNC: 4.2 MMOL/L (ref 3.5–5.1)
PROT SERPL-MCNC: 7.2 G/DL (ref 6–8.4)
RBC # BLD AUTO: 4.66 M/UL (ref 4–5.4)
SODIUM SERPL-SCNC: 141 MMOL/L (ref 136–145)
T4 FREE SERPL-MCNC: 1.06 NG/DL (ref 0.71–1.51)
TRIGL SERPL-MCNC: 118 MG/DL (ref 30–150)
TSH SERPL DL<=0.005 MIU/L-ACNC: 3.16 UIU/ML (ref 0.4–4)
WBC # BLD AUTO: 5.75 K/UL (ref 3.9–12.7)

## 2023-09-11 PROCEDURE — 80061 LIPID PANEL: CPT | Performed by: INTERNAL MEDICINE

## 2023-09-11 PROCEDURE — 84443 ASSAY THYROID STIM HORMONE: CPT | Performed by: INTERNAL MEDICINE

## 2023-09-11 PROCEDURE — 85027 COMPLETE CBC AUTOMATED: CPT | Performed by: INTERNAL MEDICINE

## 2023-09-11 PROCEDURE — 36415 COLL VENOUS BLD VENIPUNCTURE: CPT | Performed by: INTERNAL MEDICINE

## 2023-09-11 PROCEDURE — 80053 COMPREHEN METABOLIC PANEL: CPT | Performed by: INTERNAL MEDICINE

## 2023-09-11 PROCEDURE — 83036 HEMOGLOBIN GLYCOSYLATED A1C: CPT | Performed by: INTERNAL MEDICINE

## 2023-09-11 PROCEDURE — 84439 ASSAY OF FREE THYROXINE: CPT | Performed by: INTERNAL MEDICINE

## 2023-09-11 RX ORDER — SCOLOPAMINE TRANSDERMAL SYSTEM 1 MG/1
1 PATCH, EXTENDED RELEASE TRANSDERMAL
Qty: 5 PATCH | Refills: 1 | Status: SHIPPED | OUTPATIENT
Start: 2023-09-11

## 2023-10-07 ENCOUNTER — PATIENT MESSAGE (OUTPATIENT)
Dept: PRIMARY CARE CLINIC | Facility: CLINIC | Age: 68
End: 2023-10-07
Payer: MEDICARE

## 2023-10-07 ENCOUNTER — PATIENT MESSAGE (OUTPATIENT)
Dept: PAIN MEDICINE | Facility: CLINIC | Age: 68
End: 2023-10-07
Payer: MEDICARE

## 2023-10-09 ENCOUNTER — PATIENT MESSAGE (OUTPATIENT)
Dept: PRIMARY CARE CLINIC | Facility: CLINIC | Age: 68
End: 2023-10-09
Payer: MEDICARE

## 2023-10-09 DIAGNOSIS — R73.03 PREDIABETES: Primary | ICD-10-CM

## 2023-10-09 RX ORDER — METFORMIN HYDROCHLORIDE 500 MG/1
500 TABLET, FILM COATED, EXTENDED RELEASE ORAL
Qty: 90 TABLET | Refills: 3 | Status: SHIPPED | OUTPATIENT
Start: 2023-10-09 | End: 2023-10-13

## 2023-10-12 ENCOUNTER — PATIENT MESSAGE (OUTPATIENT)
Dept: PRIMARY CARE CLINIC | Facility: CLINIC | Age: 68
End: 2023-10-12
Payer: MEDICARE

## 2023-10-13 ENCOUNTER — OFFICE VISIT (OUTPATIENT)
Dept: PAIN MEDICINE | Facility: CLINIC | Age: 68
End: 2023-10-13
Payer: MEDICARE

## 2023-10-13 VITALS
BODY MASS INDEX: 32.81 KG/M2 | WEIGHT: 203.25 LBS | SYSTOLIC BLOOD PRESSURE: 128 MMHG | HEART RATE: 78 BPM | DIASTOLIC BLOOD PRESSURE: 83 MMHG

## 2023-10-13 DIAGNOSIS — M54.16 LUMBAR RADICULOPATHY: Primary | ICD-10-CM

## 2023-10-13 DIAGNOSIS — M48.061 NEURAL FORAMINAL STENOSIS OF LUMBAR SPINE: ICD-10-CM

## 2023-10-13 DIAGNOSIS — M51.36 ANNULAR TEAR OF LUMBAR DISC: ICD-10-CM

## 2023-10-13 DIAGNOSIS — M51.36 DDD (DEGENERATIVE DISC DISEASE), LUMBAR: ICD-10-CM

## 2023-10-13 DIAGNOSIS — G89.4 CHRONIC PAIN SYNDROME: ICD-10-CM

## 2023-10-13 PROCEDURE — 3044F PR MOST RECENT HEMOGLOBIN A1C LEVEL <7.0%: ICD-10-PCS | Mod: CPTII,S$GLB,, | Performed by: NURSE PRACTITIONER

## 2023-10-13 PROCEDURE — 1125F AMNT PAIN NOTED PAIN PRSNT: CPT | Mod: CPTII,S$GLB,, | Performed by: NURSE PRACTITIONER

## 2023-10-13 PROCEDURE — 99999 PR PBB SHADOW E&M-EST. PATIENT-LVL III: CPT | Mod: PBBFAC,,, | Performed by: NURSE PRACTITIONER

## 2023-10-13 PROCEDURE — 1160F PR REVIEW ALL MEDS BY PRESCRIBER/CLIN PHARMACIST DOCUMENTED: ICD-10-PCS | Mod: CPTII,S$GLB,, | Performed by: NURSE PRACTITIONER

## 2023-10-13 PROCEDURE — 3008F BODY MASS INDEX DOCD: CPT | Mod: CPTII,S$GLB,, | Performed by: NURSE PRACTITIONER

## 2023-10-13 PROCEDURE — 99999 PR PBB SHADOW E&M-EST. PATIENT-LVL III: ICD-10-PCS | Mod: PBBFAC,,, | Performed by: NURSE PRACTITIONER

## 2023-10-13 PROCEDURE — 1125F PR PAIN SEVERITY QUANTIFIED, PAIN PRESENT: ICD-10-PCS | Mod: CPTII,S$GLB,, | Performed by: NURSE PRACTITIONER

## 2023-10-13 PROCEDURE — 3079F PR MOST RECENT DIASTOLIC BLOOD PRESSURE 80-89 MM HG: ICD-10-PCS | Mod: CPTII,S$GLB,, | Performed by: NURSE PRACTITIONER

## 2023-10-13 PROCEDURE — 3079F DIAST BP 80-89 MM HG: CPT | Mod: CPTII,S$GLB,, | Performed by: NURSE PRACTITIONER

## 2023-10-13 PROCEDURE — 99214 PR OFFICE/OUTPT VISIT, EST, LEVL IV, 30-39 MIN: ICD-10-PCS | Mod: S$GLB,,, | Performed by: NURSE PRACTITIONER

## 2023-10-13 PROCEDURE — 3074F SYST BP LT 130 MM HG: CPT | Mod: CPTII,S$GLB,, | Performed by: NURSE PRACTITIONER

## 2023-10-13 PROCEDURE — 3044F HG A1C LEVEL LT 7.0%: CPT | Mod: CPTII,S$GLB,, | Performed by: NURSE PRACTITIONER

## 2023-10-13 PROCEDURE — 1160F RVW MEDS BY RX/DR IN RCRD: CPT | Mod: CPTII,S$GLB,, | Performed by: NURSE PRACTITIONER

## 2023-10-13 PROCEDURE — 3008F PR BODY MASS INDEX (BMI) DOCUMENTED: ICD-10-PCS | Mod: CPTII,S$GLB,, | Performed by: NURSE PRACTITIONER

## 2023-10-13 PROCEDURE — 99214 OFFICE O/P EST MOD 30 MIN: CPT | Mod: S$GLB,,, | Performed by: NURSE PRACTITIONER

## 2023-10-13 PROCEDURE — 3074F PR MOST RECENT SYSTOLIC BLOOD PRESSURE < 130 MM HG: ICD-10-PCS | Mod: CPTII,S$GLB,, | Performed by: NURSE PRACTITIONER

## 2023-10-13 PROCEDURE — 1159F PR MEDICATION LIST DOCUMENTED IN MEDICAL RECORD: ICD-10-PCS | Mod: CPTII,S$GLB,, | Performed by: NURSE PRACTITIONER

## 2023-10-13 PROCEDURE — 1159F MED LIST DOCD IN RCRD: CPT | Mod: CPTII,S$GLB,, | Performed by: NURSE PRACTITIONER

## 2023-10-13 RX ORDER — METFORMIN HYDROCHLORIDE 500 MG/1
500 TABLET ORAL
Qty: 90 TABLET | Refills: 3 | Status: SHIPPED | OUTPATIENT
Start: 2023-10-13 | End: 2024-10-12

## 2023-10-13 RX ORDER — METHYLPREDNISOLONE 4 MG/1
TABLET ORAL
Qty: 21 EACH | Refills: 0 | Status: SHIPPED | OUTPATIENT
Start: 2023-10-13 | End: 2023-11-03

## 2023-10-13 NOTE — PROGRESS NOTES
[facility] Established Clinic Visit    Chief Complaint:   Chief Complaint   Patient presents with    Back Pain     Interval History (10/13/2023):  Ginger Marshall returns today for follow up for continued left low back and left lateral leg pain.  She is known to our office and has been previously provided a left L3-4 and L4-5 TF KIKI that provided relief for 2-3 weeks she is also been provided a left SI joint injection that helped for 2 months.  Her pain is intermittent when she walks, stands feels tingling and burning in her left lateral leg now it is going past her knee previously was not.  It does not prevent her from performing ADLs working.  She reports that she will be leaving for vacation next week and would like to be considered for more injections and a plan of care moving forward.  She denies any profound weakness denies any recent incident or trauma denies any bowel bladder dysfunction,.  Currently she is taking Tylenol and BC powder both which alleviate her symptoms.    .    Current Pain Scales:  Current: 6/10              Typical Range: 6-7/10    Interval History(08/21/2023):   Ginger Marshall returns today for follow up.  She is s/p a Left L3/4 and L4/5 Lumbar Transforaminal Epidural Steroid Injection that provided her 30% relief. She continues to have pain in her left low back particular when active. She reports better relief following her previous Left SI joint. She denies any B/B dysfunction, denies any new pain, denies any profound weakness.     Current Pain Scales:  Current: 5/10              Typical Range: 5-6/10     History of Present Illness: Ginger Marshall is a 68 y.o. female referred by Dr. Rachel Camacho for low back and left lower extremity pain. Lateral left leg pain started about 12 years ago as numbness but about a year ago it started becoming more painful with pins and needles and a burning sensation with activity. She was seeing a chiropractor who tried manipulations and a spinal  decompression device which did not help. Wakes her up at night. Somewhat better with rest.     Onset: 12 years  Location: low back left leg  Radiation: left leg, up to knee  Timing: constant  Quality: Aching, Dull, Burning, Tingling, Deep, Numb and Hot  Exacerbating Factors: exercise, lifting, lying down, standing for more than 10 minutes and walking for more than 20 minutes  Alleviating Factors: rest and sitting  Associated Symptoms: denies urinary incontinence, bowel incontinence, significant weight loss and significant motor weakness    Severity: Currently: 3/10   Typical Range: 3-8/10     Exacerbation: 8/10     Interval History   01/18/[id number]-cwpd-old female presents today with returning left low back pain she also has intermittent numbness in her lateral left leg that she described as burning worse when standing or walking for long periods of time.  She did report improvement following her previous left SI joint injection and left GTB  that was provided to her on 10/13/2022 per Dr. Camacho.  Is the most relief she has received she has been provided a left femoral cutaneous nerve block 50% relief but for a short period time.      (10/04/2022):  Ginger Marshall returns today for follow up.  At the last clinic visit she was provide a LFCN block 50% relief.  She reports continued pain in the lateral aspect of her left leg, she does describe burning that is worse when standing or walking for long periods of time, she did report mild improvement but continues to have numbing in that area.  She also complains of left low back pain.     Current Pain Scales:  Current: 2/10              Typical Range: 2-3/10       (09/07/2022):  Ginger Marshall returns today for follow up.  At the last clinic visit, referred to PT and scheduled LFCN block.    She is in PT and this is going well.     Currently, the back and lateral hip pain is stable.  She still has left leg numbness. She denies any significant changes since previous visit.        Current Pain Scales:  Current: 4/10              Typical Range: 3-8/10       Pain Disability Index  Family/Home Responsibilities:: 6  Recreation:: 8  Social Activity:: 5  Occupation:: 6  Sexual Behavior:: 6  Self Care:: 6  Life-Support Activities:: 4  Pain Disability Index (PDI): 41    Previous Interventions:  -07/26/2023 Left  L3/4 and L4/5 Lumbar Transforaminal Epidural Steroid Injection 30%   -10/13/2022 Left Sacroiliac Joint Injection and Left Greater Trochanter Bursa Injection   -09/08/2022 left femoral cutaneous nerve block 50% relief    Previous Therapies:  PT/OT: yes   Relevant Surgery: no   Previous Medications:   - NSAIDS: Occasionally uses Ibuprofen, Tylenol which help a little  - Muscle Relaxants:    - TCAs:   - SNRIs:   - Topicals:   - Anticonvulsants:    - Opioids:     Current Pain Medications:  1-2x/week BC Powder or Ibuprofen PM     Blood Thinners: None    Full Medication List:    Current Outpatient Medications:     amLODIPine (NORVASC) 5 MG tablet, Take 1 tablet (5 mg total) by mouth once daily., Disp: 90 tablet, Rfl: 3    atorvastatin (LIPITOR) 40 MG tablet, Take 1 tablet (40 mg total) by mouth once daily., Disp: 90 tablet, Rfl: 3    levothyroxine (SYNTHROID) 112 MCG tablet, Take 1 tablet (112 mcg total) by mouth once daily., Disp: 90 tablet, Rfl: 3    metFORMIN (GLUMETZA) 500 MG (MOD) 24hr tablet, Take 1 tablet (500 mg total) by mouth daily with breakfast., Disp: 90 tablet, Rfl: 3    multivitamin capsule, Take 1 capsule by mouth once daily., Disp: , Rfl:     scopolamine (TRANSDERM-SCOP) 1.3-1.5 mg (1 mg over 3 days), Place 1 patch onto the skin every 72 hours., Disp: 5 patch, Rfl: 1     Review of Systems:  Review of Systems   Musculoskeletal:  Positive for back pain.       Allergies:  Patient has no known allergies.     Medical History:   has a past medical history of Basal cell carcinoma and Osteopenia.    Surgical History:   has a past surgical history that includes LASIK; Tonsillectomy;   section; Colonoscopy (N/A, 2022); injection, sacroiliac joint (Left, 10/13/2022); injection, sacroiliac joint (Left, 2023); Transforaminal epidural injection of steroid (Left, 2023); Adenoidectomy (?); and Eye surgery (LASIK/ RETINAL TEAR ).    Family History:  family history includes Arthritis in her sister and sister; Brain cancer in her mother; Breast cancer in some other family members; Cancer in her mother; Early death in her mother; Hyperlipidemia in her sister and sister; Stroke in her father, paternal aunt, and sister.    Social History:   reports that she has never smoked. She has never been exposed to tobacco smoke. She has never used smokeless tobacco. She reports current alcohol use of about 4.0 standard drinks of alcohol per week. She reports that she does not use drugs.    Physical Exam:  /83   Pulse 78   Wt 92.2 kg (203 lb 4.2 oz)   LMP 2000   BMI 32.81 kg/m²   GEN: No acute distress. Calm, comfortable  HENT: Normocephalic, atraumatic, moist mucous membranes  EYE: Anicteric sclera, non-injected.   CV: Non-diaphoretic. Regular Rate. Radial Pulses 2+.  RESP: Breathing comfortably. Chest expansion symmetric.  EXT: No clubbing, cyanosis.   SKIN: Warm, & dry to palpation. No visible rashes or lesions of exposed skin.   PSYCH: Pleasant mood and appropriate affect. Recent and remote memory intact.   GAIT: Independent, nonantalgic ambulation    Lumbar Spine Exam:       Inspection: No erythema, bruising. No surgical incisions      Palpation: (+) TTP of sacroiliac joint on left.       ROM: Not Limited in flexion, extension, lateral bending      (-) Facet loading bilaterally      (-) Straight Leg Raise bilaterally      (+) TYLOR on left      (+) Dior'sTest on left  Hip Exam:      Inspection: No gross deformity or apparent leg length discrepancy      Palpation: +TTP of left GTB.       ROM: Full internal rotation, external rotation without pain.       (-) FADIR  "bilaterally but with "tightness" and "stretching" on the left  Neurologic Exam:     Alert. Speech is fluent and appropriate.     Strength: 5/5 throughout bilateral lower extremities     Sensation: Abnormal to LT in LFCN distribution on the left, otherwise grossly intact to light touch in bilateral lower extremities     Reflexes: 2+ in b/l patella, achilles     Tone: No abnormality appreciated in bilateral lower extremities     No Clonus     Downgoing toes on plantar stimulation     (-) Urias bilaterally    Imaging:  - Pt has had MRI L-spine done at outside facility but will need to bring disc.     Labs:  BMP  Lab Results   Component Value Date     09/11/2023    K 4.2 09/11/2023     09/11/2023    CO2 25 09/11/2023    BUN 14 09/11/2023    CREATININE 0.8 09/11/2023    CALCIUM 9.1 09/11/2023    ANIONGAP 10 09/11/2023    ESTGFRAFRICA >60.0 01/07/2021    EGFRNONAA >60.0 01/07/2021     Lab Results   Component Value Date    ALT 21 09/11/2023    AST 23 09/11/2023    ALKPHOS 57 09/11/2023    BILITOT 0.4 09/11/2023     Lab Results   Component Value Date     09/11/2023       Assessment:  Ginger Marshall is a 68 y.o. female with the following diagnoses based on history, exam, and imaging:    Problem List Items Addressed This Visit          Neuro    Lumbar radiculopathy - Primary    DDD (degenerative disc disease), lumbar    Relevant Medications    methylPREDNISolone (MEDROL DOSEPACK) 4 mg tablet     Other Visit Diagnoses       Annular tear of lumbar disc        Chronic pain syndrome        Neural foraminal stenosis of lumbar spine                  This is a pleasant 68 y.o. lady presenting with:     - Left thigh numbness which appears consistent with Lateral Femoral Cutaneous Neuropathy/Meralgia Paresthetica: a snapshot of her L-spine MRI which shows a potential herniated disc with nerve impingement but symptoms fit less with this diagnosis and we will need to see all the images.   - Left low back pain: SIJ pain " on exam  - Left lateral thigh/hip pain with GTBursitis and ITB syndrome  - Comorbidities: HTN, HLD, Osteopenia, NORBERT. BMI >30.     10/04/2022 67-year-old female that was provided a left lateral femoral cutaneous nerve block last clinic visit she reports 50% relief however continues to experience pain in the left lateral leg described as numbness and burning that is worse when walking, standing performing ADLs.  She also complains of left low back pain, she did have SI joint pain again upon exam.  She also has pain in the left lateral hip that is likely related to GT bursitis.    01/18/[id number]-hdpo-old female with a history of left thigh numbness that did not get better following a left femoral cutaneous nerve block.  However she did get better from her left leg and left low back pain following an SI joint injection.  Based on history and exam today it does appear that she continues to have left SI joint pain, her last than left SI joint injection was in October 2022.  She states she had significant relief and would like to repeat this injection.  I discussed that it would be appropriate to repeat it at this time however I would not want to repeat this again in the proximally 6-7 month.    8/21/2023- Ginger Marshall is a 68 y.o. female who  has a past medical history of Basal cell carcinoma and Osteopenia.  By history and examination this patient has chronic low back pain with radiculopathy.  The underlying cause cause is facet arthritis, degenerative disc disease, foraminal stenosis, and sacroiliitis.  Pathology is confirmed by imaging.  We discussed the underlying diagnoses and multiple treatment options including non-opioid medications, interventional procedures, physical therapy, home exercise, and weight loss.  The risks and benefits of each treatment option were discussed and all questions were answered.      10/13/2023-Ginger Marshall is a 68 y.o. female who  has a past medical history of Basal cell carcinoma and  Osteopenia.  By history and examination this patient has chronic low back pain with LEFT lateral radiculopathy.  The underlying cause cause is facet arthritis, degenerative disc disease, foraminal stenosis, and sacroiliitis.  Pathology is confirmed by imaging.  We discussed the underlying diagnoses and multiple treatment options including non-opioid medications, interventional procedures, home exercise, core muscle enhancement, activity modification, and weight loss.  The risks and benefits of each treatment option were discussed and all questions were answered.      2022 lumbar MRI shows worst pathology at L3-4 which she has moderate diffuse disc bulge lateralizing toward the left with a faint left paracentral annular tear and minimal inferior extrusion.  There is moderate left and lateral recess and foraminal stenosis.  Descending left L4 nerve root and the existing L3 nerve root are about it.  She also has moderate left and mild right neural foraminal stenosis at L4-5 and at L5-S1 and annular tear right    Treatment Plan:   - PT/OT/HEP:  Continue to work on SIJ dysfunction, GTB, and IT band tightness with home exercise plan.  Discussed benefits of exercise for pain.   - Procedures:  Consider repeating left lumbar KIKI interlaminar at L3, 2-3 weeks of relief                           Consider repeating left SI joint injection this gave her 2-3 months of relief  - Medications: Rx of Medrol dose ranjan today to use for trip out of the town.   - Consider adding gabapentin in the future for neuropathic symptoms.   - Imaging: Reviewed.  Reviewed disc and written MRI report both will be scanned into media.                    Consider updating lumbar MRI when she returns in 2-3 weeks  - Labs: Reviewed. Medications are appropriately dosed for current hepatorenal function.  -consider referral to Neurosurgery    Follow Up:  2-3 weeks      ANA CRISTINA Renae  Interventional Pain Management    Disclaimer: This note was partly  generated using dictation software which may occasionally result in transcription errors.

## 2023-11-09 ENCOUNTER — PATIENT MESSAGE (OUTPATIENT)
Dept: PRIMARY CARE CLINIC | Facility: CLINIC | Age: 68
End: 2023-11-09
Payer: MEDICARE

## 2024-01-17 ENCOUNTER — OFFICE VISIT (OUTPATIENT)
Dept: DERMATOLOGY | Facility: CLINIC | Age: 69
End: 2024-01-17
Payer: MEDICARE

## 2024-01-17 DIAGNOSIS — B00.9 HSV (HERPES SIMPLEX VIRUS) INFECTION: Primary | ICD-10-CM

## 2024-01-17 DIAGNOSIS — Z12.83 SKIN CANCER SCREENING: ICD-10-CM

## 2024-01-17 DIAGNOSIS — L82.1 SK (SEBORRHEIC KERATOSIS): ICD-10-CM

## 2024-01-17 DIAGNOSIS — L57.0 AK (ACTINIC KERATOSIS): ICD-10-CM

## 2024-01-17 DIAGNOSIS — L81.4 LENTIGINES: ICD-10-CM

## 2024-01-17 DIAGNOSIS — D48.5 NEOPLASM OF UNCERTAIN BEHAVIOR OF SKIN: ICD-10-CM

## 2024-01-17 DIAGNOSIS — L72.3 INFLAMED EPIDERMOID CYST OF SKIN: ICD-10-CM

## 2024-01-17 PROCEDURE — 11102 TANGNTL BX SKIN SINGLE LES: CPT | Mod: S$GLB,,, | Performed by: DERMATOLOGY

## 2024-01-17 PROCEDURE — 88305 TISSUE EXAM BY PATHOLOGIST: CPT | Mod: 26,,, | Performed by: PATHOLOGY

## 2024-01-17 PROCEDURE — 88342 IMHCHEM/IMCYTCHM 1ST ANTB: CPT | Performed by: PATHOLOGY

## 2024-01-17 PROCEDURE — 88342 IMHCHEM/IMCYTCHM 1ST ANTB: CPT | Mod: 26,,, | Performed by: PATHOLOGY

## 2024-01-17 PROCEDURE — 3288F FALL RISK ASSESSMENT DOCD: CPT | Mod: CPTII,S$GLB,, | Performed by: DERMATOLOGY

## 2024-01-17 PROCEDURE — 88305 TISSUE EXAM BY PATHOLOGIST: CPT | Performed by: PATHOLOGY

## 2024-01-17 PROCEDURE — 17000 DESTRUCT PREMALG LESION: CPT | Mod: XS,S$GLB,, | Performed by: DERMATOLOGY

## 2024-01-17 PROCEDURE — 1101F PT FALLS ASSESS-DOCD LE1/YR: CPT | Mod: CPTII,S$GLB,, | Performed by: DERMATOLOGY

## 2024-01-17 PROCEDURE — 1126F AMNT PAIN NOTED NONE PRSNT: CPT | Mod: CPTII,S$GLB,, | Performed by: DERMATOLOGY

## 2024-01-17 PROCEDURE — 1160F RVW MEDS BY RX/DR IN RCRD: CPT | Mod: CPTII,S$GLB,, | Performed by: DERMATOLOGY

## 2024-01-17 PROCEDURE — 1159F MED LIST DOCD IN RCRD: CPT | Mod: CPTII,S$GLB,, | Performed by: DERMATOLOGY

## 2024-01-17 PROCEDURE — 99999 PR PBB SHADOW E&M-EST. PATIENT-LVL III: CPT | Mod: PBBFAC,,, | Performed by: DERMATOLOGY

## 2024-01-17 PROCEDURE — 99204 OFFICE O/P NEW MOD 45 MIN: CPT | Mod: 25,S$GLB,, | Performed by: DERMATOLOGY

## 2024-01-17 RX ORDER — VALACYCLOVIR HYDROCHLORIDE 500 MG/1
500 TABLET, FILM COATED ORAL DAILY
Qty: 30 TABLET | Refills: 5 | Status: SHIPPED | OUTPATIENT
Start: 2024-01-17 | End: 2024-07-15

## 2024-01-17 RX ORDER — DOXYCYCLINE 100 MG/1
100 CAPSULE ORAL 2 TIMES DAILY
Qty: 28 CAPSULE | Refills: 0 | Status: SHIPPED | OUTPATIENT
Start: 2024-01-17 | End: 2024-01-22 | Stop reason: SDUPTHER

## 2024-01-17 NOTE — PROGRESS NOTES
Subjective:      Patient ID:  Ginger Marshall is a 68 y.o. female who presents for   Chief Complaint   Patient presents with    Cyst    Lesion     Ginger Marshall is a 68 y.o. female who presents for: evaluation of skin lesions.    Last office visit 08/18/20    The patient has the following cyst of concern:  Location: back  Duration: 1 month, keeps coming back  Symptoms: hurts - bra rubs against it. Throbs sometimes.  Relieving factors/Previous treatments: none    Patient with new complaint of lesion(s)  Location: Lt side of mouth  Duration: 10 yrs off and on  Symptoms: burns, hurts, peels  Relieving factors/Previous treatments: OTC ointment  Stays for about 2 weeks at a time. Tried an ointment in past which didn't help.  Breaks out every month or two.    Had BCC of forehead excised by SSW in 2020.    Review of Systems   Skin:  Positive for itching, daily sunscreen use (most) and activity-related sunscreen use. Negative for recent sunburn.   Hematologic/Lymphatic: Does not bruise/bleed easily.       Objective:   Physical Exam   Constitutional: She appears well-developed and well-nourished. No distress.   Neurological: She is alert and oriented to person, place, and time. She is not disoriented.   Psychiatric: She has a normal mood and affect.   Skin:   Areas Examined (abnormalities noted in diagram):   Head / Face Inspection Performed  Neck Inspection Performed  Chest / Axilla Inspection Performed  Back Inspection Performed  RUE Inspected  LUE Inspection Performed                           Diagram Legend     Erythematous scaling macule/papule c/w actinic keratosis       Vascular papule c/w angioma      Pigmented verrucoid papule/plaque c/w seborrheic keratosis      Yellow umbilicated papule c/w sebaceous hyperplasia      Irregularly shaped tan macule c/w lentigo     1-2 mm smooth white papules consistent with Milia      Movable subcutaneous cyst with punctum c/w epidermal inclusion cyst      Subcutaneous movable cyst c/w  pilar cyst      Firm pink to brown papule c/w dermatofibroma      Pedunculated fleshy papule(s) c/w skin tag(s)      Evenly pigmented macule c/w junctional nevus     Mildly variegated pigmented, slightly irregular-bordered macule c/w mildly atypical nevus      Flesh colored to evenly pigmented papule c/w intradermal nevus       Pink pearly papule/plaque c/w basal cell carcinoma      Erythematous hyperkeratotic cursted plaque c/w SCC      Surgical scar with no sign of skin cancer recurrence      Open and closed comedones      Inflammatory papules and pustules      Verrucoid papule consistent consistent with wart     Erythematous eczematous patches and plaques     Dystrophic onycholytic nail with subungual debris c/w onychomycosis     Umbilicated papule    Erythematous-base heme-crusted tan verrucoid plaque consistent with inflamed seborrheic keratosis     Erythematous Silvery Scaling Plaque c/w Psoriasis     See annotation      Assessment / Plan:    Neoplasm of uncertain behavior of skin  Shave biopsy procedure note:    Shave biopsy performed after verbal consent including risk of infection, scar, recurrence, need for additional treatment of site. Area prepped with alcohol, anesthetized with approximately 1.0cc of 1% lidocaine with epinephrine. Lesional tissue shaved with razor blade. Hemostasis achieved with application of aluminum chloride followed by hyfrecation. No complications. Dressing applied. Wound care explained.    -     Specimen to Pathology, Dermatology  Pathology Orders:       Normal Orders This Visit    Specimen to Pathology, Dermatology     Comments:    Number of Specimens:->1  ------------------------->-------------------------  Spec 1 Procedure:->Biopsy  Spec 1 Clinical Impression:->L upper back  Spec 1 Source:->r/o atypical nevus vs lentigo vs lentigo  maligna vs other    Questions:    Procedure Type: Dermatology and skin neoplasms    Number of Specimens: 1    ------------------------:  -------------------------    Spec 1 Procedure: Biopsy    Spec 1 Clinical Impression: L upper back    Spec 1 Source: r/o atypical nevus vs lentigo vs lentigo maligna vs other    Release to patient:           HSV (herpes simplex virus) infection  -     valACYclovir (VALTREX) 500 MG tablet; Take 1 tablet (500 mg total) by mouth once daily.  Dispense: 30 tablet; Refill: 5    Inflamed epidermoid cyst of skin  -     doxycycline (MONODOX) 100 MG capsule; Take 1 capsule (100 mg total) by mouth 2 (two) times daily. Take with food and water. Remain upright x 1 hr after taking.  Dispense: 28 capsule; Refill: 0  Discussed benefits and risks of doxycyline therapy including but not limited to GI discomfort, esophageal irritation/ulceration, and increased sun sensitivity. Patient was counseled to take medicine with meals and at least 1 hour before lying down.     AK (actinic keratosis)  Cryosurgery Procedure Note    Verbal consent from the patient is obtained including, but not limited to, risk of hypopigmentation/hyperpigmentation, scar, recurrence of lesion. The patient is aware of the precancerous quality and need for treatment of these lesions. Liquid nitrogen cryosurgery is applied to the 1 actinic keratosis, as detailed in the physical exam, to produce a freeze injury. The patient is aware that blisters may form and is instructed on wound care with gentle cleansing and use of vaseline ointment to keep moist until healed. The patient is supplied a handout on cryosurgery and is instructed to call if lesions do not completely resolve.    SK (seborrheic keratosis)  These are benign inherited growths without a malignant potential. Reassurance given to patient. No treatment is necessary.   Treatment of benign, asymptomatic lesions may be considered cosmetic.  Warned about risk of hypo- or hyperpigmentation with treatment and risk of recurrence.    Lentigines  These are benign sun spots which should be monitored for changes.  Patient instructed in importance of daily broad spectrum sunscreen use with spf at least 30. Sun avoidance and topical protection/protective clothing discussed.    Skin cancer screening  Upper body skin examination performed today including at least 6 points as noted in physical examination. Suspicious lesions noted above.  Patient instructed in importance of daily broad spectrum sunscreen use with spf at least 30. Sun avoidance and topical protection/protective clothing discussed.      Follow up in about 6 months (around 7/17/2024) for skin check or sooner pending biopsy results.

## 2024-01-18 NOTE — PATIENT INSTRUCTIONS
Sun Protection      The [facility] would like to remind you of the importance of sun protection all year round and particularly during the summer when the suns rays are the strongest. It has been proven that both acute and chronic sun exposure damages our cells and leads to skin cancer. Beyond skin cancer, the sun causes 90% of the symptoms of premature skin aging, including wrinkles, lentigines (brown spots), and thin, easily bruised skin. Proper sun protection can help prevent these unwanted conditions.    Many patients report that they dont go in the sun. It has been shown that the average person receives 18 hours of incidental sun exposure per week during activities such as walking through parking lots, driving, or sitting next to windows. This accumulates to several bad sunburns per year!    In choosing sunscreen, you want one that protects against both UVA and UVB rays (broad spectrum). It is recommended that you use one of SPF 30 or higher. It is important to apply the sunscreen about 20 minutes prior to sun exposure. Most sunscreens are chemical sunscreens and a reaction must take place in the skin so that they are effective. If they are applied and then you are immediately exposed to the sun or start sweating, this reaction has not had time to take place and you are therefore unprotected. Sunscreen needs to be reapplied every 2 hours if you are participating in water sports or sweating. We recommend Elta MD or CeraVe sunscreens for daily use; however there are many options and it is most important for you to find one that you will use on a consistent basis.    If you have sensitive skin, you may do best with a sunscreen that contains only physical blockers in the active ingredient section. The only physical blockers available in the [place] currently are titanium dioxide or zinc oxide. These are typically thicker and harder to apply, however they afford very good protection.  Neutrogena Sensitive Skin, Blue Lizard Sensitive Skin (pink top) or Neutrogena Pure and Free are popular ones.     Aside from sunscreen, clothes with UV protection (UPF), wide brimmed hats, and sunglasses are other means of sun protection that we recommend.      Based on a recent study (6/2021) and out of an abundance of caution, we are recommending that you AVOID the following sunscreens as they may contain the carcinogen, benzene:    Spray and gel sunscreens  Any [employer] or [employer] brands as well as Max Block and TopCare brands   Neutrogena Ultra Sheer Dry-touch Water Resistant Sunscreen LOTION SPF 70   Neutrogena Sheer Zinc Dry-touch Face Sunscreen LOTION SPF 50   5.   Aveeno Baby Continuous Protection Sensitive Skin Sunscreen LOTION - Broad Spectrum SPF 50    Please note that Benzene is not an ingredient or the degradation product of any ingredient in any sunscreen. This study suggested that the findings are a result of contamination in the manufacturing process. At this point, we don't know how effectively Benzene gets through the skin, if it gets absorbed systemically, and what effects it may have.     We do know that ultraviolet radiation is a well-established carcinogen. Please use daily sun protection/avoidance and use of at least SPF 30, broad-spectrum sunscreen not listed above.                       [facility] - DERMATOLOGY [address]  [address]  [place] [address]  Dept: [phone]  Dept Fax: [phone]                                                                              CRYOSURGERY      Your doctor has used a method called cryosurgery to treat your skin condition. Cryosurgery refers to the use of very cold substances to treat a variety of skin conditions such as warts, pre-skin cancers, molluscum contagiosum, sun spots, and several benign growths. The substance we use in cryosurgery is liquid nitrogen and is so cold (-195 degrees Celsius) that is burns  when administered.     Following treatment in the office, the skin may immediately burn and become red. You may find the area around the lesion is affected as well. It is sometimes necessary to treat not only the lesion, but a small area of the surrounding normal skin to achieve a good response.     A blister, and even a blood filled blister, may form after treatment.   This is a normal response. If the blister is painful, it is acceptable to sterilize a needle and with rubbing alcohol and gently pop the blister. It is important that you gently wash the area with soap and warm water as the blister fluid may contain wart virus if a wart was treated. Do no remove the roof of the blister.     The area treated can take anywhere from 1-3 weeks to heal. Healing time depends on the kind skin lesion treated, the location, and how aggressively the lesion was treated. It is recommended that the areas treated are covered with Vaseline or bacitracin ointment and a band-aid. If a band-aid is not practical, just ointment applied several times per day will do. Keeping these areas moist will speed the healing time.    Treatment with liquid nitrogen can leave a scar. In dark skin, it may be a light or dark scar, in light skin it may be a white or pink scar. These will generally fade with time, but may never go away completely.     If you have any concerns after your treatment, please feel free to call the office.       [address] [place] [address]/ [phone] FAX/ [email or web address]  Shave Biopsy Wound Care    Your doctor has performed a shave biopsy today.  A band aid and vaseline ointment has been placed over the site.  This should remain in place for NO LONGER THAN 48 hours.  It is fine to remove the bandaid after 24 hours, if the area is no longer bleeding. It is recommended that you keep the area dry (do not wet)) for the first 24 hours.  After 24 hours, wash the area with warm soap and water and  apply Vaseline jelly.  Many patients prefer to use Neosporin or Bacitracin ointment.  This is acceptable; however, know that you can develop an allergy to this medication even if you have used it safely for years.  It is important to keep the area moist.  Letting it dry out and get air slows healing time, and will worsen the scar.        If you notice increasing redness, tenderness, pain, or yellow drainage at the biopsy site, please notify your doctor.  These are signs of an infection.    If your biopsy site is bleeding, apply firm pressure for 15 minutes straight.  Repeat for another 15 minutes, if it is still bleeding.   If the surgical site continues to bleed, then please contact your doctor.      For [facility] users:   You will receive your biopsy results in [facility] as soon as they are available. Please be assured that your physician/provider will review your results and will then determine what further treatment, evaluation, or planning is required. You should be contacted by your physician's/provider's office within 5 business days of receiving your results; If not, please reach out to directly. This is one more way [facility] is putting you first.     [facility] [place] [address]/ [phone] FAX/ [email or web address]

## 2024-01-19 ENCOUNTER — PATIENT MESSAGE (OUTPATIENT)
Dept: DERMATOLOGY | Facility: CLINIC | Age: 69
End: 2024-01-19
Payer: MEDICARE

## 2024-01-22 DIAGNOSIS — L72.3 INFLAMED EPIDERMOID CYST OF SKIN: ICD-10-CM

## 2024-01-23 NOTE — TELEPHONE ENCOUNTER
Encounter Date: 01/17/2024      Inflamed epidermoid cyst of skin  -     doxycycline (MONODOX) 100 MG capsule; Take 1 capsule (100 mg total) by mouth 2 (two) times daily. Take with food and water. Remain upright x 1 hr after taking.  Dispense: 28 capsule; Refill: 0  Discussed benefits and risks of doxycyline therapy including but not limited to GI discomfort, esophageal irritation/ulceration, and increased sun sensitivity. Patient was counseled to take medicine with meals and at least 1 hour before lying down.

## 2024-01-24 LAB
FINAL PATHOLOGIC DIAGNOSIS: NORMAL
GROSS: NORMAL
Lab: NORMAL
MICROSCOPIC EXAM: NORMAL

## 2024-01-26 ENCOUNTER — PATIENT MESSAGE (OUTPATIENT)
Dept: DERMATOLOGY | Facility: CLINIC | Age: 69
End: 2024-01-26
Payer: MEDICARE

## 2024-01-26 RX ORDER — DOXYCYCLINE 100 MG/1
100 CAPSULE ORAL 2 TIMES DAILY
Qty: 28 CAPSULE | Refills: 0 | Status: SHIPPED | OUTPATIENT
Start: 2024-01-26 | End: 2024-01-31 | Stop reason: SDUPTHER

## 2024-01-28 DIAGNOSIS — L72.3 INFLAMED EPIDERMOID CYST OF SKIN: ICD-10-CM

## 2024-01-29 ENCOUNTER — PATIENT MESSAGE (OUTPATIENT)
Dept: DERMATOLOGY | Facility: CLINIC | Age: 69
End: 2024-01-29
Payer: MEDICARE

## 2024-01-31 ENCOUNTER — PROCEDURE VISIT (OUTPATIENT)
Dept: DERMATOLOGY | Facility: CLINIC | Age: 69
End: 2024-01-31
Payer: MEDICARE

## 2024-01-31 DIAGNOSIS — D48.5 NEOPLASM OF UNCERTAIN BEHAVIOR OF SKIN: Primary | ICD-10-CM

## 2024-01-31 DIAGNOSIS — L72.3 INFLAMED EPIDERMOID CYST OF SKIN: ICD-10-CM

## 2024-01-31 PROCEDURE — 12032 INTMD RPR S/A/T/EXT 2.6-7.5: CPT | Mod: S$GLB,,, | Performed by: DERMATOLOGY

## 2024-01-31 PROCEDURE — 99499 UNLISTED E&M SERVICE: CPT | Mod: S$GLB,,, | Performed by: DERMATOLOGY

## 2024-01-31 PROCEDURE — 88304 TISSUE EXAM BY PATHOLOGIST: CPT | Performed by: PATHOLOGY

## 2024-01-31 PROCEDURE — 88305 TISSUE EXAM BY PATHOLOGIST: CPT | Mod: 26,,, | Performed by: PATHOLOGY

## 2024-01-31 PROCEDURE — 11402 EXC TR-EXT B9+MARG 1.1-2 CM: CPT | Mod: 51,S$GLB,, | Performed by: DERMATOLOGY

## 2024-01-31 RX ORDER — DOXYCYCLINE 100 MG/1
100 CAPSULE ORAL 2 TIMES DAILY
Qty: 20 CAPSULE | Refills: 0 | Status: SHIPPED | OUTPATIENT
Start: 2024-01-31 | End: 2024-04-25 | Stop reason: ALTCHOICE

## 2024-01-31 RX ORDER — DOXYCYCLINE 100 MG/1
CAPSULE ORAL
Qty: 90 CAPSULE | OUTPATIENT
Start: 2024-01-31

## 2024-01-31 NOTE — PATIENT INSTRUCTIONS
Post- Operative Wound Care    Your doctor has performed local skin surgery today.  Vaseline ointment and a pressure bandage were placed after the surgery.  It is very important that you keep this bandage in place for 24 hours.  This will decrease the risk of post - operative infection and bleeding.  After 24 hours, you may remove the band aid and wash the area with warm soap and water and apply Vaseline ointment.  Many patients prefer to use Neosporin or Bacitracin ointment.  This is acceptable; however know that you can develop an allergy to this medication even if you have used it safely for years.  It is important to keep the area moist.  Letting it dry out and get air slows healing time, will worsen the scar, and make it more difficult to remove the stitches.  Band aid is optional after first 24 hours.    It is best NOT to use hydrogen peroxide or antibacterial soap to wash the operative site.       If you notice increasing redness, tenderness, pain, or yellow drainage at the biopsy or surgical site, please notify your doctor.  These are signs of an infection.    If your biopsy/surgical site is bleeding, apply firm pressure for 15 minutes straight.  Repeat for another 15 minutes, if it is still bleeding.   If the surgical site continues to bleed, then please contact your doctor.      For [facility] users:   You will receive your pathology results in [facility] as soon as they are available. Please be assured that your physician/provider will review your results and contact you should additional treatment be required. This is one more way [facility] is putting you first.       [facility] [place] [address]/ [phone] FAX/ [email or web address]

## 2024-01-31 NOTE — PROGRESS NOTES
PROCEDURE: Elliptical excision with intermediate layered repair in order to decrease dead space and decrease tension.    ANESTHETIC: 6 cc 1% Xylocaine with Epinephrine 1:100,000, buffered    SURGEON: Katty Vickers M.D.    ASSISTANTS: Garcia Swift MA    PREOPERATIVE DIAGNOSIS:   inflamed/ruptured EIC vs other    POSTOPERATIVE DIAGNOSIS:  Same as preoperative diagnosis    PATHOLOGIC DIAGNOSIS: Pending    LOCATION: mid back    INITIAL LESION SIZE: 2.8x2 cm    EXCISED DIAMETER: 2 cm    PREPARATION: The diagnosis, procedure, alternatives, benefits and risks, including but not limited to: infection, bleeding/bruising, drug reactions, pain, scar or cosmetic defect, local sensation disturbances, wound dehiscence (separation of wound edges after sutures removed) and/or recurrence of present condition were explained to the patient. The patient elected to proceed.  Patient's identity was verified using 2 patient identifiers and the side and site was verified.  Time out period with surgeon, assistant and patient in surgical suite was taken.    PROCEDURE: The location noted above was prepped, draped, and anesthetized in the usual sterile fashion per  Katty Vickers . Lesional tissue was carefully marked. A fusiform elliptical excision was done with #15 blade carried down completely through the dermis into the deep subcutaneous tissues to the level of the non-muscle fascia, and dissection was carried out in that plane.  Electrocoagulation was used to obtain hemostasis. Blood loss was minimal. The wound was then approximated in a layered fashion with subcutaneous and intradermal sutures of 3.0 Monocryl, approximately 4 in number, and the wound was then superficially closed with simple interrupted sutures of 4.0 Prolene.    The patient tolerated the procedure well.    The area was cleaned and dressed appropriately and the patient was given wound care instructions, as well as an appointment for follow-up evaluation.    LENGTH  OF REPAIR: 4.5 cm

## 2024-02-01 ENCOUNTER — PATIENT MESSAGE (OUTPATIENT)
Dept: ADMINISTRATIVE | Facility: OTHER | Age: 69
End: 2024-02-01
Payer: MEDICARE

## 2024-02-06 LAB
FINAL PATHOLOGIC DIAGNOSIS: NORMAL
GROSS: NORMAL
Lab: NORMAL
MICROSCOPIC EXAM: NORMAL

## 2024-02-07 ENCOUNTER — TELEPHONE (OUTPATIENT)
Dept: PAIN MEDICINE | Facility: CLINIC | Age: 69
End: 2024-02-07
Payer: MEDICARE

## 2024-02-07 NOTE — TELEPHONE ENCOUNTER
I attempted to contact in regards to message in the portal. Pt didn't answer. I left vm asking for a call back.  ----- Message from Guanakito London sent at 2/7/2024  9:07 AM CST -----  Contact: pt  Type: Referral     Who Called: pt   Would the patient rather a call back or a response via [facility]? call  Best Call Back Number: [phone]  Additional Information:   Pt requesting a call regarding a referral to see a surgeon but its not in chart yet

## 2024-02-08 ENCOUNTER — TELEPHONE (OUTPATIENT)
Dept: PAIN MEDICINE | Facility: CLINIC | Age: 69
End: 2024-02-08
Payer: MEDICARE

## 2024-02-08 NOTE — TELEPHONE ENCOUNTER
I attempted to contact pt in regards to message in the portal. Pt didn't answer. I left vm asking for a call back. ----- Message from Amie Ramirez sent at 2/8/2024 12:29 PM CST -----  Type:  Patient Returning Call    Who Called: Pt   Who Left Message for Patient: Gerry   Does the patient know what this is regarding?: Returning a missed call   Would the patient rather a call back or a response via [facility]? Call back   Best Call Back Number: [phone]

## 2024-02-08 NOTE — TELEPHONE ENCOUNTER
I attempted to contact pt in regards to message in the portal pt didn't answer I left vm asking for a call back. ----- Message from Laurel Amaya MA sent at 2/8/2024  2:25 PM CST -----    ----- Message -----  From: Meggan Posada  Sent: 2/8/2024   2:13 PM CST  To: [facility] Staff    Needs advice from nurse:      Who Called:pt  Regarding:returning a call to Gerry re: a referral  Would the patient rather a call back or VIA [facility]?  Best Call Back number: [phone]  Additional Info:

## 2024-02-14 ENCOUNTER — PROCEDURE VISIT (OUTPATIENT)
Dept: DERMATOLOGY | Facility: CLINIC | Age: 69
End: 2024-02-14
Payer: MEDICARE

## 2024-02-14 DIAGNOSIS — D22.9 ATYPICAL NEVUS: Primary | ICD-10-CM

## 2024-02-14 PROCEDURE — 88305 TISSUE EXAM BY PATHOLOGIST: CPT | Mod: 26,,, | Performed by: PATHOLOGY

## 2024-02-14 PROCEDURE — 88342 IMHCHEM/IMCYTCHM 1ST ANTB: CPT | Performed by: PATHOLOGY

## 2024-02-14 PROCEDURE — 88342 IMHCHEM/IMCYTCHM 1ST ANTB: CPT | Mod: 26,,, | Performed by: PATHOLOGY

## 2024-02-14 PROCEDURE — 88305 TISSUE EXAM BY PATHOLOGIST: CPT | Performed by: PATHOLOGY

## 2024-02-14 PROCEDURE — 99499 UNLISTED E&M SERVICE: CPT | Mod: S$GLB,,, | Performed by: DERMATOLOGY

## 2024-02-14 PROCEDURE — 11402 EXC TR-EXT B9+MARG 1.1-2 CM: CPT | Mod: 51,S$GLB,, | Performed by: DERMATOLOGY

## 2024-02-14 PROCEDURE — 12032 INTMD RPR S/A/T/EXT 2.6-7.5: CPT | Mod: S$GLB,,, | Performed by: DERMATOLOGY

## 2024-02-14 RX ORDER — GENTAMICIN SULFATE 1 MG/G
OINTMENT TOPICAL 2 TIMES DAILY
Qty: 30 G | Refills: 1 | Status: SHIPPED | OUTPATIENT
Start: 2024-02-14 | End: 2024-04-25 | Stop reason: ALTCHOICE

## 2024-02-14 NOTE — PROGRESS NOTES
PROCEDURE: Elliptical excision with intermediate layered repair in order to decrease dead space and decrease tension.    ANESTHETIC: 7.5 cc 1% Xylocaine with Epinephrine 1:100,000, buffered    SURGEON: Katty Vickers M.D.    ASSISTANTS: Garcia Swift MA    PREOPERATIVE DIAGNOSIS:  Severely Atypical Nevus    POSTOPERATIVE DIAGNOSIS:  Same as preoperative diagnosis    PATHOLOGIC DIAGNOSIS: Pending    LOCATION: L upper back    INITIAL LESION SIZE: 1.1x0.8 cm    EXCISED DIAMETER: 1.9 cm    PREPARATION: The diagnosis, procedure, alternatives, benefits and risks, including but not limited to: infection, bleeding/bruising, drug reactions, pain, scar or cosmetic defect, local sensation disturbances, wound dehiscence (separation of wound edges after sutures removed) and/or recurrence of present condition were explained to the patient. The patient elected to proceed.  Patient's identity was verified using 2 patient identifiers and the side and site was verified.  Time out period with surgeon, assistant and patient in surgical suite was taken.    PROCEDURE: The location noted above was prepped, draped, and anesthetized in the usual sterile fashion per  Katty Vickers . Lesional tissue was carefully marked with at least 4 mm margins of clinically normal skin in all directions. A fusiform elliptical excision was done with #15 blade carried down completely through the dermis into the deep subcutaneous tissues to the level of the non-muscle fascia, and dissection was carried out in that plane.  Electrocoagulation was used to obtain hemostasis. Blood loss was minimal. The wound was then approximated in a layered fashion with subcutaneous and intradermal sutures of 4.0 Monocryl, approximately 4 in number, and the wound was then superficially closed with simple interrupted sutures of 4.0 Prolene.    The patient tolerated the procedure well.    The area was cleaned and dressed appropriately and the patient was given wound care  instructions, as well as an appointment for follow-up evaluation.    LENGTH OF REPAIR: 4.5 cm

## 2024-02-14 NOTE — PATIENT INSTRUCTIONS
Post- Operative Wound Care    Your doctor has performed local skin surgery today.  Vaseline ointment and a pressure bandage were placed after the surgery.  It is very important that you keep this bandage in place for 24 hours.  This will decrease the risk of post - operative infection and bleeding.  After 24 hours, you may remove the band aid and wash the area with warm soap and water and apply Vaseline ointment.  Many patients prefer to use Neosporin or Bacitracin ointment.  This is acceptable; however know that you can develop an allergy to this medication even if you have used it safely for years.  It is important to keep the area moist.  Letting it dry out and get air slows healing time, will worsen the scar, and make it more difficult to remove the stitches.  Band aid is optional after first 24 hours.    It is best NOT to use hydrogen peroxide or antibacterial soap to wash the operative site.       If you notice increasing redness, tenderness, pain, or yellow drainage at the biopsy or surgical site, please notify your doctor.  These are signs of an infection.    If your biopsy/surgical site is bleeding, apply firm pressure for 15 minutes straight.  Repeat for another 15 minutes, if it is still bleeding.   If the surgical site continues to bleed, then please contact your doctor.      For [facility] users:   You will receive your pathology results in [facility] as soon as they are available. Please be assured that your physician/provider will review your results and contact you should additional treatment be required. This is one more way [facility] is putting you first.       [facility] [place] [address]/ [phone] FAX/ [email or web address]

## 2024-02-21 ENCOUNTER — TELEPHONE (OUTPATIENT)
Dept: PAIN MEDICINE | Facility: CLINIC | Age: 69
End: 2024-02-21
Payer: MEDICARE

## 2024-02-21 ENCOUNTER — PATIENT MESSAGE (OUTPATIENT)
Dept: PAIN MEDICINE | Facility: CLINIC | Age: 69
End: 2024-02-21
Payer: MEDICARE

## 2024-02-21 LAB
FINAL PATHOLOGIC DIAGNOSIS: NORMAL
GROSS: NORMAL
Lab: NORMAL
MICROSCOPIC EXAM: NORMAL

## 2024-02-21 NOTE — TELEPHONE ENCOUNTER
Sent patient a message through portal saying george would like to see Ms. Marshall in office.   ----- Message from ALMA Tracy sent at 2/21/2024  4:42 PM CST -----  Yeah she needs to be seen in office.     ty  ----- Message -----  From: Laurel Amaya MA  Sent: 2/21/2024   2:17 PM CST  To: ALMA Tracy      Spoke to Ms. Marshall she said she is leaving 03/24 going out of town and would like a hip injection. Do you want to see her in office first ? She was last seen in October. Just let me know please.   ----- Message -----  From: Rodriguez Stinson  Sent: 2/21/2024  12:05 PM CST  To: [facility] Staff    Type:  Needs Medical Advice    Who Called: pt  Symptoms (please be specific): back pain   How long has patient had these symptoms:  on going  Pharmacy name and phone #:    Would the patient rather a call back or a response via [facility]? call  Best Call Back Number: [phone]  Additional Information: and referral to see a surgeon

## 2024-02-22 ENCOUNTER — TELEPHONE (OUTPATIENT)
Dept: PAIN MEDICINE | Facility: CLINIC | Age: 69
End: 2024-02-22
Payer: MEDICARE

## 2024-02-22 ENCOUNTER — OFFICE VISIT (OUTPATIENT)
Dept: PAIN MEDICINE | Facility: CLINIC | Age: 69
End: 2024-02-22
Payer: MEDICARE

## 2024-02-22 VITALS
HEART RATE: 83 BPM | DIASTOLIC BLOOD PRESSURE: 76 MMHG | HEIGHT: 66 IN | SYSTOLIC BLOOD PRESSURE: 112 MMHG | WEIGHT: 194.44 LBS | BODY MASS INDEX: 31.25 KG/M2

## 2024-02-22 DIAGNOSIS — G89.4 CHRONIC PAIN SYNDROME: ICD-10-CM

## 2024-02-22 DIAGNOSIS — M51.36 DDD (DEGENERATIVE DISC DISEASE), LUMBAR: Primary | ICD-10-CM

## 2024-02-22 DIAGNOSIS — M54.16 LUMBAR RADICULOPATHY, CHRONIC: Primary | ICD-10-CM

## 2024-02-22 DIAGNOSIS — M54.16 LUMBAR RADICULOPATHY, CHRONIC: ICD-10-CM

## 2024-02-22 DIAGNOSIS — M48.061 NEURAL FORAMINAL STENOSIS OF LUMBAR SPINE: ICD-10-CM

## 2024-02-22 DIAGNOSIS — M51.36 ANNULAR TEAR OF LUMBAR DISC: ICD-10-CM

## 2024-02-22 PROCEDURE — 1160F RVW MEDS BY RX/DR IN RCRD: CPT | Mod: CPTII,S$GLB,, | Performed by: NURSE PRACTITIONER

## 2024-02-22 PROCEDURE — 3074F SYST BP LT 130 MM HG: CPT | Mod: CPTII,S$GLB,, | Performed by: NURSE PRACTITIONER

## 2024-02-22 PROCEDURE — 1159F MED LIST DOCD IN RCRD: CPT | Mod: CPTII,S$GLB,, | Performed by: NURSE PRACTITIONER

## 2024-02-22 PROCEDURE — 3008F BODY MASS INDEX DOCD: CPT | Mod: CPTII,S$GLB,, | Performed by: NURSE PRACTITIONER

## 2024-02-22 PROCEDURE — 99999 PR PBB SHADOW E&M-EST. PATIENT-LVL III: CPT | Mod: PBBFAC,,, | Performed by: NURSE PRACTITIONER

## 2024-02-22 PROCEDURE — 1125F AMNT PAIN NOTED PAIN PRSNT: CPT | Mod: CPTII,S$GLB,, | Performed by: NURSE PRACTITIONER

## 2024-02-22 PROCEDURE — 99214 OFFICE O/P EST MOD 30 MIN: CPT | Mod: S$GLB,,, | Performed by: NURSE PRACTITIONER

## 2024-02-22 PROCEDURE — 3078F DIAST BP <80 MM HG: CPT | Mod: CPTII,S$GLB,, | Performed by: NURSE PRACTITIONER

## 2024-02-22 NOTE — PROGRESS NOTES
[facility] Established Clinic Visit    Chief Complaint:   Chief Complaint   Patient presents with    Injections       Interval History 2/22/2024:  68-year-old female that presents today complaining of left low back and left lateral leg pain.  She is established office and was previously provided a left L3-4 and L4-5 TF KIKI that provided her with 50% for 3-4 months and then pain slowly returned.  She has also been provide a left SI joint injection that lasted for 2 months.  She denies any recent incident or trauma denies any profound weakness she does report numbness tingling burning mainly numbness in her left leg.  Is exacerbated after walking for long periods of time and standing she has requesting a repeat left TF KIKI.      Interval History  (10/13/2023):   Ginger Marshall returns today for follow up for continued left low back and left lateral leg pain.  She is known to our office and has been previously provided a left L3-4 and L4-5 TF KIKI that provided relief for 2-3 weeks she is also been provided a left SI joint injection that helped for 2 months.  Her pain is intermittent when she walks, stands feels tingling and burning in her left lateral leg now it is going past her knee previously was not.  It does not prevent her from performing ADLs working.  She reports that she will be leaving for vacation next week and would like to be considered for more injections and a plan of care moving forward.  She denies any profound weakness denies any recent incident or trauma denies any bowel bladder dysfunction,.  Currently she is taking Tylenol and BC powder both which alleviate her symptoms.    .    Current Pain Scales:  Current: 6/10              Typical Range: 6-7/10    Interval History(08/21/2023):   Ginger Marshall returns today for follow up.  She is s/p a Left L3/4 and L4/5 Lumbar Transforaminal Epidural Steroid Injection that provided her 30% relief. She continues to have pain in her left low back particular  when active. She reports better relief following her previous Left SI joint. She denies any B/B dysfunction, denies any new pain, denies any profound weakness.     Current Pain Scales:  Current: 5/10              Typical Range: 5-6/10     History of Present Illness: Ginger Marshall is a 68 y.o. female referred by Dr. Rachel Camacho for low back and left lower extremity pain. Lateral left leg pain started about 12 years ago as numbness but about a year ago it started becoming more painful with pins and needles and a burning sensation with activity. She was seeing a chiropractor who tried manipulations and a spinal decompression device which did not help. Wakes her up at night. Somewhat better with rest.     Onset: 12 years  Location: low back left leg  Radiation: left leg, up to knee  Timing: constant  Quality: Aching, Dull, Burning, Tingling, Deep, Numb and Hot  Exacerbating Factors: exercise, lifting, lying down, standing for more than 10 minutes and walking for more than 20 minutes  Alleviating Factors: rest and sitting  Associated Symptoms: denies urinary incontinence, bowel incontinence, significant weight loss and significant motor weakness    Severity: Currently: 3/10   Typical Range: 3-8/10     Exacerbation: 8/10     Interval History   01[birth year]18/[id number]-ksyi-old female presents today with returning left low back pain she also has intermittent numbness in her lateral left leg that she described as burning worse when standing or walking for long periods of time.  She did report improvement following her previous left SI joint injection and left GTB  that was provided to her on 10/13/2022 per Dr. Camacho.  Is the most relief she has received she has been provided a left femoral cutaneous nerve block 50% relief but for a short period time.      (10/04/2022):  Ginger Marshall returns today for follow up.  At the last clinic visit she was provide a LFCN block 50% relief.  She reports continued pain in the lateral aspect of her  left leg, she does describe burning that is worse when standing or walking for long periods of time, she did report mild improvement but continues to have numbing in that area.  She also complains of left low back pain.     Current Pain Scales:  Current: 2/10              Typical Range: 2-3/10       (09/07/2022):  Ginger Marshall returns today for follow up.  At the last clinic visit, referred to PT and scheduled LFCN block.    She is in PT and this is going well.     Currently, the back and lateral hip pain is stable.  She still has left leg numbness. She denies any significant changes since previous visit.       Current Pain Scales:  Current: 4/10              Typical Range: 3-8/10       Pain Disability Index  Family/Home Responsibilities:: 6  Recreation:: 8  Social Activity:: 7  Occupation:: 7  Sexual Behavior:: 5  Self Care:: 6  Life-Support Activities:: 3  Pain Disability Index (PDI): 42    Previous Interventions:  -07/26/2023 Left  L3/4 and L4/5 Lumbar Transforaminal Epidural Steroid Injection 50%   -10/13/2022 Left Sacroiliac Joint Injection and Left Greater Trochanter Bursa Injection   -09/08/2022 left femoral cutaneous nerve block 50% relief    Previous Therapies:  PT/OT: yes   Relevant Surgery: no   Previous Medications:   - NSAIDS: Occasionally uses Ibuprofen, Tylenol which help a little  - Muscle Relaxants:    - TCAs:   - SNRIs:   - Topicals:   - Anticonvulsants:    - Opioids:     Current Pain Medications:  1-2x/week BC Powder or Ibuprofen PM     Blood Thinners: None    Full Medication List:    Current Outpatient Medications:     amLODIPine (NORVASC) 5 MG tablet, Take 1 tablet (5 mg total) by mouth once daily., Disp: 90 tablet, Rfl: 3    atorvastatin (LIPITOR) 40 MG tablet, Take 1 tablet (40 mg total) by mouth once daily., Disp: 90 tablet, Rfl: 3    doxycycline (MONODOX) 100 MG capsule, Take 1 capsule (100 mg total) by mouth 2 (two) times daily. Take with food and water. Remain upright x 1 hr after taking.,  "Disp: 20 capsule, Rfl: 0    gentamicin (GARAMYCIN) 0.1 % ointment, Apply topically 2 (two) times daily. To excision sites on back, Disp: 30 g, Rfl: 1    levothyroxine (SYNTHROID) 112 MCG tablet, Take 1 tablet (112 mcg total) by mouth once daily., Disp: 90 tablet, Rfl: 3    metFORMIN (GLUCOPHAGE) 500 MG tablet, Take 1 tablet (500 mg total) by mouth daily with breakfast., Disp: 90 tablet, Rfl: 3    multivitamin capsule, Take 1 capsule by mouth once daily., Disp: , Rfl:     scopolamine (TRANSDERM-SCOP) 1.3-1.5 mg (1 mg over 3 days), Place 1 patch onto the skin every 72 hours., Disp: 5 patch, Rfl: 1    valACYclovir (VALTREX) 500 MG tablet, Take 1 tablet (500 mg total) by mouth once daily., Disp: 30 tablet, Rfl: 5     Review of Systems:  Review of Systems   Musculoskeletal:  Positive for back pain.       Allergies:  Patient has no known allergies.     Medical History:   has a past medical history of Basal cell carcinoma (2020) and Osteopenia.    Surgical History:   has a past surgical history that includes LASIK; Tonsillectomy;  section; Colonoscopy (N/A, 2022); injection, sacroiliac joint (Left, 10/13/2022); injection, sacroiliac joint (Left, 2023); Transforaminal epidural injection of steroid (Left, 2023); Adenoidectomy (?); and Eye surgery (LASIK/ RETINAL TEAR ).    Family History:  family history includes Arthritis in her sister and sister; Brain cancer in her mother; Breast cancer in some other family members; Cancer in her mother; Early death in her mother; Hyperlipidemia in her sister and sister; Stroke in her father, paternal aunt, and sister.    Social History:   reports that she has never smoked. She has never been exposed to tobacco smoke. She has never used smokeless tobacco. She reports current alcohol use of about 4.0 standard drinks of alcohol per week. She reports that she does not use drugs.    Physical Exam:  /76   Pulse 83   Ht 5' 6" (1.676 m)   Wt 88.2 " "kg (194 lb 7.1 oz)   LMP 01/01/2000   BMI 31.38 kg/m²   GEN: No acute distress. Calm, comfortable  HENT: Normocephalic, atraumatic, moist mucous membranes  EYE: Anicteric sclera, non-injected.   CV: Non-diaphoretic. Regular Rate. Radial Pulses 2+.  RESP: Breathing comfortably. Chest expansion symmetric.  EXT: No clubbing, cyanosis.   SKIN: Warm, & dry to palpation. No visible rashes or lesions of exposed skin.   PSYCH: Pleasant mood and appropriate affect. Recent and remote memory intact.   GAIT: Independent, nonantalgic ambulation    Lumbar Spine Exam:       Inspection: No erythema, bruising. No surgical incisions      Palpation: (+) TTP of sacroiliac joint on left.       ROM: Not Limited in flexion, extension, lateral bending      (-) Facet loading bilaterally      (-) Straight Leg Raise bilaterally      (+) TYLOR on left      (+) Dior'sTest on left  Hip Exam:      Inspection: No gross deformity or apparent leg length discrepancy      Palpation: +TTP of left GTB.       ROM: Full internal rotation, external rotation without pain.       (-) FADIR bilaterally but with "tightness" and "stretching" on the left  Neurologic Exam:     Alert. Speech is fluent and appropriate.     Strength: 5/5 throughout bilateral lower extremities     Sensation: Abnormal to LT in LFCN distribution on the left, otherwise grossly intact to light touch in bilateral lower extremities     Reflexes: 2+ in b/l patella, achilles     Tone: No abnormality appreciated in bilateral lower extremities     No Clonus     Downgoing toes on plantar stimulation     (-) Urias bilaterally    Imaging:  - Pt has had MRI L-spine done at outside facility but will need to bring disc.     Labs:  BMP  Lab Results   Component Value Date     09/11/2023    K 4.2 09/11/2023     09/11/2023    CO2 25 09/11/2023    BUN 14 09/11/2023    CREATININE 0.8 09/11/2023    CALCIUM 9.1 09/11/2023    ANIONGAP 10 09/11/2023    ESTGFRAFRICA >60.0 01/07/2021    EGFRNONAA " >60.0 01/07/2021     Lab Results   Component Value Date    ALT 21 09/11/2023    AST 23 09/11/2023    ALKPHOS 57 09/11/2023    BILITOT 0.4 09/11/2023     Lab Results   Component Value Date     09/11/2023       Assessment:  Ginger Marshall is a 68 y.o. female with the following diagnoses based on history, exam, and imaging:    Problem List Items Addressed This Visit          Neuro    DDD (degenerative disc disease), lumbar - Primary    Relevant Orders    MRI Lumbar Spine Without Contrast    Procedure Request Order for Pain Management     Other Visit Diagnoses       Lumbar radiculopathy, chronic        Relevant Orders    MRI Lumbar Spine Without Contrast    Procedure Request Order for Pain Management    Chronic pain syndrome        Relevant Orders    MRI Lumbar Spine Without Contrast    Procedure Request Order for Pain Management              This is a pleasant 68 y.o. lady presenting with:     - Left thigh numbness which appears consistent with Lateral Femoral Cutaneous Neuropathy/Meralgia Paresthetica: a snapshot of her L-spine MRI which shows a potential herniated disc with nerve impingement but symptoms fit less with this diagnosis and we will need to see all the images.   - Left low back pain: SIJ pain on exam  - Left lateral thigh/hip pain with GTBursitis and ITB syndrome  - Comorbidities: HTN, HLD, Osteopenia, NORBERT. BMI >30.     10/04/2022 67-year-old female that was provided a left lateral femoral cutaneous nerve block last clinic visit she reports 50% relief however continues to experience pain in the left lateral leg described as numbness and burning that is worse when walking, standing performing ADLs.  She also complains of left low back pain, she did have SI joint pain again upon exam.  She also has pain in the left lateral hip that is likely related to GT bursitis.    01/18/[id number]-cyqc-old female with a history of left thigh numbness that did not get better following a left femoral cutaneous nerve  block.  However she did get better from her left leg and left low back pain following an SI joint injection.  Based on history and exam today it does appear that she continues to have left SI joint pain, her last than left SI joint injection was in October 2022.  She states she had significant relief and would like to repeat this injection.  I discussed that it would be appropriate to repeat it at this time however I would not want to repeat this again in the proximally 6-7 month.    8/21/2023- Ginger Marshall is a 68 y.o. female who  has a past medical history of Basal cell carcinoma (08/20/2020) and Osteopenia.  By history and examination this patient has chronic low back pain with radiculopathy.  The underlying cause cause is facet arthritis, degenerative disc disease, foraminal stenosis, and sacroiliitis.  Pathology is confirmed by imaging.  We discussed the underlying diagnoses and multiple treatment options including non-opioid medications, interventional procedures, physical therapy, home exercise, and weight loss.  The risks and benefits of each treatment option were discussed and all questions were answered.      10/13/2023-Ginger Marshall is a 68 y.o. female who  has a past medical history of Basal cell carcinoma (08/20/2020) and Osteopenia.  By history and examination this patient has chronic low back pain with LEFT lateral radiculopathy.  The underlying cause cause is facet arthritis, degenerative disc disease, foraminal stenosis, and sacroiliitis.  Pathology is confirmed by imaging.  We discussed the underlying diagnoses and multiple treatment options including non-opioid medications, interventional procedures, home exercise, core muscle enhancement, activity modification, and weight loss.  The risks and benefits of each treatment option were discussed and all questions were answered.      2022 lumbar MRI shows worst pathology at L3-4 which she has moderate diffuse disc bulge lateralizing toward the left with a  faint left paracentral annular tear and minimal inferior extrusion.  There is moderate left and lateral recess and foraminal stenosis.  Descending left L4 nerve root and the existing L3 nerve root are about it.  She also has moderate left and mild right neural foraminal stenosis at L4-5 and at L5-S1 and annular tear right      02/22/2024-Ginger Marshall is a 68 y.o. female who  has a past medical history of Basal cell carcinoma (08/20/2020) and Osteopenia.  By history and examination this patient has chronic low back pain with RIGHT  radiculopathy.  The underlying cause  is facet arthritis, degenerative disc disease, foraminal stenosis, and central canal stenosis.  Pathology is confirmed by imaging.  We discussed the underlying diagnoses and multiple treatment options including non-opioid medications, interventional procedures, physical therapy, home exercise, core muscle enhancement, and activity modification.  The risks and benefits of each treatment option were discussed and all questions were answered.    Her MRI is significant for disc bulges at L3-4 this lateralizes towards the left with a faint lateral paracentral annular tear and minimal inferior extrusion of disc material into the left paracentral region.  Additionally at  L4-5 disc bulge, facet arthropathy which results in moderate left and mild right neural foraminal narrowing.  At L5-S1 she has a mild diffuse disc bulge with right paracentral annular tear.  She also has bilateral facet arthropathy this level.      Treatment Plan:   - PT/OT/HEP:  Continue to work on SIJ dysfunction, GTB, and IT band tightness with home exercise plan.  Discussed benefits of exercise for pain.   - Procedures:  Scheduled for repeat left L3-4 and L4-5 TF KIKI                            Consider repeating left SI joint injection this gave her 2-3 months of relief  - Medications:  No changes recommended in medications at this time   - Consider adding gabapentin in the future for  neuropathic symptoms.   - Imaging:  Updating lumbar MRI today   - Labs: Reviewed. Medications are appropriately dosed for current hepatorenal function.  --Referral to nsgy  following Lumbar MRI       Follow Up:  2-3 weeks p injection      Adrian Portillo NP-C  Interventional Pain Management    Disclaimer: This note was partly generated using dictation software which may occasionally result in transcription errors.

## 2024-02-22 NOTE — TELEPHONE ENCOUNTER
----- Message from ALMA Tracy sent at 2024  1:26 PM CST -----  Regarding: Order for JAKE ALICEA    Patient Name: JAKE ALICEA([id number])  Sex: Female  : 1955      PCP: GUILLERMINA ANDREW    Center: [facility]     Types of orders made on 2024: Imaging, Procedure Request    Order Date:2024  Ordering User:TRENT DAWKINS [[id number]]  Encounter Provider:Trent Dawkins FNP [[id number]]  Authorizing Provide  r: Trent Dawkins FNP [[id number]]  Supervising Provider:DEEPA ARRIAZA [[id number]]  Type of Supervision:Collaborating Physician  Department:[facility] PAIN MANAGEMENT[[id number]]    Common Order Information  Procedure -> Transforaminal Injection (Specify level and laterality) Cmt: Left             L3/4 and L4/5    Pre-op Diagnosis -> Spinal stenosis at L4-L5 level     Order Specific Information  Order: Procedure Request Order   for Pain Management [Custom: [id number]]  Order #:          [id number]: 1 FUTURE    Priority: Routine  Class: Clinic Performed    Future Order Information      Expires on:2025            Expected by:2024                   Associated Diagnoses      M54.16 Lumbar radiculopathy, chronic      M51.36 DDD (degenerative disc disease), lumbar      G89.4 Chronic pain syndrome      Physician -> Gelter           Is patient on anti-coagulants? -> No         Facility Name: -> [facility]         Follow-up: -> 2 weeks           Priority: Routine  Class: Clinic Performed    Future Order Information      Expires on:2025            Expected by:2024                   Associated Diagnoses      M54.16 Lumbar radiculopathy, chronic      M51.36 DDD (degenerative disc disease), lumbar      G89.4 Chronic pain syndrome        Procedure -> Transforaminal Injection (Specify level and laterality) Cmt:                 Left L3/4 and L4/5        Physician -> Gelter         Is patient on anti-coagulants? -> No         Pre-op Diagnosis -> Spinal stenosis at L4-L5 level         Facility  Name: -> [facility]         Follow-up: -> 2 weeks

## 2024-02-22 NOTE — H&P (VIEW-ONLY)
[facility] Establ[facility] Clinic Visit    Chief Complaint:   Chief Complaint   Patient presents with    Injections       Interval History 2/22/2024:  68-year-old female that presents today complaining of left low back and left lateral leg pain.  She is established office and was previously provided a left L3-4 and L4-5 TF KIKI that provided her with 50% for 3-4 months and then pain slowly returned.  She has also been provide a left SI joint injection that lasted for 2 months.  She denies any recent incident or trauma denies any profound weakness she does report numbness tingling burning mainly numbness in her left leg.  Is exacerbated after walking for long periods of time and standing she has requesting a repeat left TF KIKI.      Interval History  (10/13/2023):   Ginger Marshall returns today for follow up for continued left low back and left lateral leg pain.  She is known to our office and has been previously provided a left L3-4 and L4-5 TF KIKI that provided relief for 2-3 weeks she is also been provided a left SI joint injection that helped for 2 months.  Her pain is intermittent when she walks, stands feels tingling and burning in her left lateral leg now it is going past her knee previously was not.  It does not prevent her from performing ADLs working.  She reports that she will be leaving for vacation next week and would like to be considered for more injections and a plan of care moving forward.  She denies any profound weakness denies any recent incident or trauma denies any bowel bladder dysfunction,.  Currently she is taking Tylenol and BC powder both which alleviate her symptoms.    .    Current Pain Scales:  Current: 6/10              Typical Range: 6-7/10    Interval History(08/21/2023):   Ginger Marshall returns today for follow up.  She is s/p a Left L3/4 and L4/5 Lumbar Transforaminal Epidural Steroid Injection that provided her 30% relief. She continues to have pain in her left low back particular  when active. She reports better relief following her previous Left SI joint. She denies any B/B dysfunction, denies any new pain, denies any profound weakness.     Current Pain Scales:  Current: 5/10              Typical Range: 5-6/10     History of Present Illness: Ginger Marshall is a 68 y.o. female referred by Dr. Rachel Camacho for low back and left lower extremity pain. Lateral left leg pain started about 12 years ago as numbness but about a year ago it started becoming more painful with pins and needles and a burning sensation with activity. She was seeing a chiropractor who tried manipulations and a spinal decompression device which did not help. Wakes her up at night. Somewhat better with rest.     Onset: 12 years  Location: low back left leg  Radiation: left leg, up to knee  Timing: constant  Quality: Aching, Dull, Burning, Tingling, Deep, Numb and Hot  Exacerbating Factors: exercise, lifting, lying down, standing for more than 10 minutes and walking for more than 20 minutes  Alleviating Factors: rest and sitting  Associated Symptoms: denies urinary incontinence, bowel incontinence, significant weight loss and significant motor weakness    Severity: Currently: 3/10   Typical Range: 3-8/10     Exacerbation: 8/10     Interval History   01/18/[id number]-wfhr-old female presents today with returning left low back pain she also has intermittent numbness in her lateral left leg that she described as burning worse when standing or walking for long periods of time.  She did report improvement following her previous left SI joint injection and left GTB  that was provided to her on 10/13/2022 per Dr. Camacho.  Is the most relief she has received she has been provided a left femoral cutaneous nerve block 50% relief but for a short period time.      (10/04/2022):  Ginger Marshall returns today for follow up.  At the last clinic visit she was provide a LFCN block 50% relief.  She reports continued pain in the lateral aspect of her  left leg, she does describe burning that is worse when standing or walking for long periods of time, she did report mild improvement but continues to have numbing in that area.  She also complains of left low back pain.     Current Pain Scales:  Current: 2/10              Typical Range: 2-3/10       (09/07/2022):  Ginger Marshall returns today for follow up.  At the last clinic visit, referred to PT and scheduled LFCN block.    She is in PT and this is going well.     Currently, the back and lateral hip pain is stable.  She still has left leg numbness. She denies any significant changes since previous visit.       Current Pain Scales:  Current: 4/10              Typical Range: 3-8/10       Pain Disability Index  Family/Home Responsibilities:: 6  Recreation:: 8  Social Activity:: 7  Occupation:: 7  Sexual Behavior:: 5  Self Care:: 6  Life-Support Activities:: 3  Pain Disability Index (PDI): 42    Previous Interventions:  -07/26/2023 Left  L3/4 and L4/5 Lumbar Transforaminal Epidural Steroid Injection 50%   -10/13/2022 Left Sacroiliac Joint Injection and Left Greater Trochanter Bursa Injection   -09/08/2022 left femoral cutaneous nerve block 50% relief    Previous Therapies:  PT/OT: yes   Relevant Surgery: no   Previous Medications:   - NSAIDS: Occasionally uses Ibuprofen, Tylenol which help a little  - Muscle Relaxants:    - TCAs:   - SNRIs:   - Topicals:   - Anticonvulsants:    - Opioids:     Current Pain Medications:  1-2x/week BC Powder or Ibuprofen PM     Blood Thinners: None    Full Medication List:    Current Outpatient Medications:     amLODIPine (NORVASC) 5 MG tablet, Take 1 tablet (5 mg total) by mouth once daily., Disp: 90 tablet, Rfl: 3    atorvastatin (LIPITOR) 40 MG tablet, Take 1 tablet (40 mg total) by mouth once daily., Disp: 90 tablet, Rfl: 3    doxycycline (MONODOX) 100 MG capsule, Take 1 capsule (100 mg total) by mouth 2 (two) times daily. Take with food and water. Remain upright x 1 hr after taking.,  "Disp: 20 capsule, Rfl: 0    gentamicin (GARAMYCIN) 0.1 % ointment, Apply topically 2 (two) times daily. To excision sites on back, Disp: 30 g, Rfl: 1    levothyroxine (SYNTHROID) 112 MCG tablet, Take 1 tablet (112 mcg total) by mouth once daily., Disp: 90 tablet, Rfl: 3    metFORMIN (GLUCOPHAGE) 500 MG tablet, Take 1 tablet (500 mg total) by mouth daily with breakfast., Disp: 90 tablet, Rfl: 3    multivitamin capsule, Take 1 capsule by mouth once daily., Disp: , Rfl:     scopolamine (TRANSDERM-SCOP) 1.3-1.5 mg (1 mg over 3 days), Place 1 patch onto the skin every 72 hours., Disp: 5 patch, Rfl: 1    valACYclovir (VALTREX) 500 MG tablet, Take 1 tablet (500 mg total) by mouth once daily., Disp: 30 tablet, Rfl: 5     Review of Systems:  Review of Systems   Musculoskeletal:  Positive for back pain.       Allergies:  Patient has no known allergies.     Medical History:   has a past medical history of Basal cell carcinoma (2020) and Osteopenia.    Surgical History:   has a past surgical history that includes LASIK; Tonsillectomy;  section; Colonoscopy (N/A, 2022); injection, sacroiliac joint (Left, 10/13/2022); injection, sacroiliac joint (Left, 2023); Transforaminal epidural injection of steroid (Left, 2023); Adenoidectomy (?); and Eye surgery (LASIK/ RETINAL TEAR ).    Family History:  family history includes Arthritis in her sister and sister; Brain cancer in her mother; Breast cancer in some other family members; Cancer in her mother; Early death in her mother; Hyperlipidemia in her sister and sister; Stroke in her father, paternal aunt, and sister.    Social History:   reports that she has never smoked. She has never been exposed to tobacco smoke. She has never used smokeless tobacco. She reports current alcohol use of about 4.0 standard drinks of alcohol per week. She reports that she does not use drugs.    Physical Exam:  /76   Pulse 83   Ht 5' 6" (1.676 m)   Wt 88.2 " "kg (194 lb 7.1 oz)   LMP 01/01/2000   BMI 31.38 kg/m²   GEN: No acute distress. Calm, comfortable  HENT: Normocephalic, atraumatic, moist mucous membranes  EYE: Anicteric sclera, non-injected.   CV: Non-diaphoretic. Regular Rate. Radial Pulses 2+.  RESP: Breathing comfortably. Chest expansion symmetric.  EXT: No clubbing, cyanosis.   SKIN: Warm, & dry to palpation. No visible rashes or lesions of exposed skin.   PSYCH: Pleasant mood and appropriate affect. Recent and remote memory intact.   GAIT: Independent, nonantalgic ambulation    Lumbar Spine Exam:       Inspection: No erythema, bruising. No surgical incisions      Palpation: (+) TTP of sacroiliac joint on left.       ROM: Not Limited in flexion, extension, lateral bending      (-) Facet loading bilaterally      (-) Straight Leg Raise bilaterally      (+) TYLOR on left      (+) Dior'sTest on left  Hip Exam:      Inspection: No gross deformity or apparent leg length discrepancy      Palpation: +TTP of left GTB.       ROM: Full internal rotation, external rotation without pain.       (-) FADIR bilaterally but with "tightness" and "stretching" on the left  Neurologic Exam:     Alert. Speech is fluent and appropriate.     Strength: 5/5 throughout bilateral lower extremities     Sensation: Abnormal to LT in LFCN distribution on the left, otherwise grossly intact to light touch in bilateral lower extremities     Reflexes: 2+ in b/l patella, achilles     Tone: No abnormality appreciated in bilateral lower extremities     No Clonus     Downgoing toes on plantar stimulation     (-) Urias bilaterally    Imaging:  - Pt has had MRI L-spine done at outside facility but will need to bring disc.     Labs:  BMP  Lab Results   Component Value Date     09/11/2023    K 4.2 09/11/2023     09/11/2023    CO2 25 09/11/2023    BUN 14 09/11/2023    CREATININE 0.8 09/11/2023    CALCIUM 9.1 09/11/2023    ANIONGAP 10 09/11/2023    ESTGFRAFRICA >60.0 01/07/2021    EGFRNONAA " >60.0 01/07/2021     Lab Results   Component Value Date    ALT 21 09/11/2023    AST 23 09/11/2023    ALKPHOS 57 09/11/2023    BILITOT 0.4 09/11/2023     Lab Results   Component Value Date     09/11/2023       Assessment:  Ginger Marshall is a 68 y.o. female with the following diagnoses based on history, exam, and imaging:    Problem List Items Addressed This Visit          Neuro    DDD (degenerative disc disease), lumbar - Primary    Relevant Orders    MRI Lumbar Spine Without Contrast    Procedure Request Order for Pain Management     Other Visit Diagnoses       Lumbar radiculopathy, chronic        Relevant Orders    MRI Lumbar Spine Without Contrast    Procedure Request Order for Pain Management    Chronic pain syndrome        Relevant Orders    MRI Lumbar Spine Without Contrast    Procedure Request Order for Pain Management              This is a pleasant 68 y.o. lady presenting with:     - Left thigh numbness which appears consistent with Lateral Femoral Cutaneous Neuropathy/Meralgia Paresthetica: a snapshot of her L-spine MRI which shows a potential herniated disc with nerve impingement but symptoms fit less with this diagnosis and we will need to see all the images.   - Left low back pain: SIJ pain on exam  - Left lateral thigh/hip pain with GTBursitis and ITB syndrome  - Comorbidities: HTN, HLD, Osteopenia, NORBERT. BMI >30.     10/04/2022 67-year-old female that was provided a left lateral femoral cutaneous nerve block last clinic visit she reports 50% relief however continues to experience pain in the left lateral leg described as numbness and burning that is worse when walking, standing performing ADLs.  She also complains of left low back pain, she did have SI joint pain again upon exam.  She also has pain in the left lateral hip that is likely related to GT bursitis.    01/18/[id number]-ykdf-old female with a history of left thigh numbness that did not get better following a left femoral cutaneous nerve  block.  However she did get better from her left leg and left low back pain following an SI joint injection.  Based on history and exam today it does appear that she continues to have left SI joint pain, her last than left SI joint injection was in October 2022.  She states she had significant relief and would like to repeat this injection.  I discussed that it would be appropriate to repeat it at this time however I would not want to repeat this again in the proximally 6-7 month.    8/21/2023- Ginger Marshall is a 68 y.o. female who  has a past medical history of Basal cell carcinoma (08/20/2020) and Osteopenia.  By history and examination this patient has chronic low back pain with radiculopathy.  The underlying cause cause is facet arthritis, degenerative disc disease, foraminal stenosis, and sacroiliitis.  Pathology is confirmed by imaging.  We discussed the underlying diagnoses and multiple treatment options including non-opioid medications, interventional procedures, physical therapy, home exercise, and weight loss.  The risks and benefits of each treatment option were discussed and all questions were answered.      10/13/2023-Ginger Masrhall is a 68 y.o. female who  has a past medical history of Basal cell carcinoma (08/20/2020) and Osteopenia.  By history and examination this patient has chronic low back pain with LEFT lateral radiculopathy.  The underlying cause cause is facet arthritis, degenerative disc disease, foraminal stenosis, and sacroiliitis.  Pathology is confirmed by imaging.  We discussed the underlying diagnoses and multiple treatment options including non-opioid medications, interventional procedures, home exercise, core muscle enhancement, activity modification, and weight loss.  The risks and benefits of each treatment option were discussed and all questions were answered.      2022 lumbar MRI shows worst pathology at L3-4 which she has moderate diffuse disc bulge lateralizing toward the left with a  faint left paracentral annular tear and minimal inferior extrusion.  There is moderate left and lateral recess and foraminal stenosis.  Descending left L4 nerve root and the existing L3 nerve root are about it.  She also has moderate left and mild right neural foraminal stenosis at L4-5 and at L5-S1 and annular tear right      02/22/2024-Ginger Marshall is a 68 y.o. female who  has a past medical history of Basal cell carcinoma (08/20/2020) and Osteopenia.  By history and examination this patient has chronic low back pain with RIGHT  radiculopathy.  The underlying cause  is facet arthritis, degenerative disc disease, foraminal stenosis, and central canal stenosis.  Pathology is confirmed by imaging.  We discussed the underlying diagnoses and multiple treatment options including non-opioid medications, interventional procedures, physical therapy, home exercise, core muscle enhancement, and activity modification.  The risks and benefits of each treatment option were discussed and all questions were answered.    Her MRI is significant for disc bulges at L3-4 this lateralizes towards the left with a faint lateral paracentral annular tear and minimal inferior extrusion of disc material into the left paracentral region.  Additionally at  L4-5 disc bulge, facet arthropathy which results in moderate left and mild right neural foraminal narrowing.  At L5-S1 she has a mild diffuse disc bulge with right paracentral annular tear.  She also has bilateral facet arthropathy this level.      Treatment Plan:   - PT/OT/HEP:  Continue to work on SIJ dysfunction, GTB, and IT band tightness with home exercise plan.  Discussed benefits of exercise for pain.   - Procedures:  Scheduled for repeat left L3-4 and L4-5 TF KIKI                            Consider repeating left SI joint injection this gave her 2-3 months of relief  - Medications:  No changes recommended in medications at this time   - Consider adding gabapentin in the future for  neuropathic symptoms.   - Imaging:  Updating lumbar MRI today   - Labs: Reviewed. Medications are appropriately dosed for current hepatorenal function.  --Referral to nsgy  following Lumbar MRI       Follow Up:  2-3 weeks p injection      Adrian Portillo NP-C  [facility]    Disclaimer: This note was partly generated using dictation software which may occasionally result in transcription errors.

## 2024-02-28 ENCOUNTER — CLINICAL SUPPORT (OUTPATIENT)
Dept: DERMATOLOGY | Facility: CLINIC | Age: 69
End: 2024-02-28
Payer: MEDICARE

## 2024-02-28 DIAGNOSIS — Z48.02 VISIT FOR SUTURE REMOVAL: Primary | ICD-10-CM

## 2024-02-28 NOTE — PROGRESS NOTES
Suture Removal note:  CC: 68 y.o. female patient is here for suture removal.         HPI: Patient is s/p excision of RESIDUAL ATYPICAL JUNCTIONAL MELANOCYTIC HYPERPLASIA from the L-upper back on 02/14/2024.  Patient reports no problems.    WOUND PE:  Sutures intact.  Wound healing well.  Good approximation of skin edges.  No signs or symptoms of infection.    IMPRESSION: margins clear.    PLAN:  Sutures removed today.  Continue wound care.    RTC: In 6 months.

## 2024-03-01 ENCOUNTER — HOSPITAL ENCOUNTER (OUTPATIENT)
Dept: RADIOLOGY | Facility: HOSPITAL | Age: 69
Discharge: HOME | End: 2024-03-01
Attending: NURSE PRACTITIONER
Payer: MEDICARE

## 2024-03-01 DIAGNOSIS — M54.16 LUMBAR RADICULOPATHY, CHRONIC: ICD-10-CM

## 2024-03-01 DIAGNOSIS — M51.36 DDD (DEGENERATIVE DISC DISEASE), LUMBAR: ICD-10-CM

## 2024-03-01 DIAGNOSIS — G89.4 CHRONIC PAIN SYNDROME: ICD-10-CM

## 2024-03-01 PROCEDURE — 72148 MRI LUMBAR SPINE W/O DYE: CPT | Mod: 26,,, | Performed by: RADIOLOGY

## 2024-03-01 PROCEDURE — 72148 MRI LUMBAR SPINE W/O DYE: CPT | Mod: TC

## 2024-03-02 ENCOUNTER — PATIENT MESSAGE (OUTPATIENT)
Dept: PRIMARY CARE CLINIC | Facility: CLINIC | Age: 69
End: 2024-03-02
Payer: MEDICARE

## 2024-03-06 ENCOUNTER — TELEPHONE (OUTPATIENT)
Dept: PAIN MEDICINE | Facility: CLINIC | Age: 69
End: 2024-03-06
Payer: MEDICARE

## 2024-03-07 ENCOUNTER — PATIENT MESSAGE (OUTPATIENT)
Dept: PAIN MEDICINE | Facility: CLINIC | Age: 69
End: 2024-03-07
Payer: MEDICARE

## 2024-03-07 ENCOUNTER — OFFICE VISIT (OUTPATIENT)
Dept: PRIMARY CARE CLINIC | Facility: CLINIC | Age: 69
End: 2024-03-07
Payer: MEDICARE

## 2024-03-07 VITALS
BODY MASS INDEX: 32.56 KG/M2 | HEART RATE: 85 BPM | WEIGHT: 202.63 LBS | DIASTOLIC BLOOD PRESSURE: 78 MMHG | OXYGEN SATURATION: 96 % | SYSTOLIC BLOOD PRESSURE: 126 MMHG | HEIGHT: 66 IN

## 2024-03-07 DIAGNOSIS — M54.16 LUMBAR RADICULOPATHY, CHRONIC: Primary | ICD-10-CM

## 2024-03-07 DIAGNOSIS — I10 ESSENTIAL HYPERTENSION: Primary | ICD-10-CM

## 2024-03-07 DIAGNOSIS — M46.1 SACROILIITIS: ICD-10-CM

## 2024-03-07 DIAGNOSIS — R00.2 PALPITATIONS: ICD-10-CM

## 2024-03-07 DIAGNOSIS — M51.36 DDD (DEGENERATIVE DISC DISEASE), LUMBAR: ICD-10-CM

## 2024-03-07 PROCEDURE — 3074F SYST BP LT 130 MM HG: CPT | Mod: CPTII,S$GLB,, | Performed by: NURSE PRACTITIONER

## 2024-03-07 PROCEDURE — 93010 ELECTROCARDIOGRAM REPORT: CPT | Mod: S$GLB,,, | Performed by: INTERNAL MEDICINE

## 2024-03-07 PROCEDURE — 3008F BODY MASS INDEX DOCD: CPT | Mod: CPTII,S$GLB,, | Performed by: NURSE PRACTITIONER

## 2024-03-07 PROCEDURE — 99999 PR PBB SHADOW E&M-EST. PATIENT-LVL III: CPT | Mod: PBBFAC,,, | Performed by: NURSE PRACTITIONER

## 2024-03-07 PROCEDURE — 99214 OFFICE O/P EST MOD 30 MIN: CPT | Mod: S$GLB,,, | Performed by: NURSE PRACTITIONER

## 2024-03-07 PROCEDURE — 3078F DIAST BP <80 MM HG: CPT | Mod: CPTII,S$GLB,, | Performed by: NURSE PRACTITIONER

## 2024-03-07 PROCEDURE — 93005 ELECTROCARDIOGRAM TRACING: CPT | Mod: S$GLB,,, | Performed by: NURSE PRACTITIONER

## 2024-03-12 ENCOUNTER — PATIENT MESSAGE (OUTPATIENT)
Dept: PRIMARY CARE CLINIC | Facility: CLINIC | Age: 69
End: 2024-03-12
Payer: MEDICARE

## 2024-03-12 LAB
OHS QRS DURATION: 78 MS
OHS QTC CALCULATION: 480 MS

## 2024-03-12 NOTE — PRE-PROCEDURE INSTRUCTIONS
Patient reviewed on 3/12/2024.  Okay to proceed at [facility]. The following pre-procedure instructions and arrival time have been reviewed with patient via phone and sent to patient portal for review.  Patient verbalized an understanding.      Dear iGnger ,     You are scheduled for a procedure with Dr. Mendoza on 3/13/2024.  Your scheduled arrival time is 12:20 pm.  This arrival time is roughly 1 hour before your anticipated procedure time to allow sufficient time for pre-op..  Please wear comfortable clothes.  Most patients do not need to change into a gown.  Please do not wear a dress.  This procedure will take place at the [facility] at the corner of [facility] and [facility].  It is in the [facility] next to [facility].  The address is:     [address] [place] [address].  [place] [id number]     After entering the building, you will proceed to the second floor where you can check in with registration. You should take any medications that you routinely take for blood pressure, heart medications, thyroid, cholesterol, etc.      The fasting restrictions are dependent on whether or not you are receiving sedation.  Sedation is not available for all procedures.      Your fasting instructions are as follow:  No sedation.  You do not need to fast before this procedure.  You can eat and drink like normal.  You can drive yourself (with stipulations).  There are some rare cases where you may need to call an uber if you are unable to drive after the procedure due to weakness/dizziness.      If you are on blood thinners, you need to follow the anticoagulation instructions that had been discussed previously.  You should only stop the blood thinners if it was approved by your primary care physician or your cardiologist.  In the event that you are not able to stop your blood thinners, a blood thinner was not listed on your medication list, or we were not able to get clearance from your  cardiologist, then the procedure may have to be postponed/canceled.      IF you were told to stop your blood thinners, this is how long you should generally hold some of the more common ones.  Remember that stopping blood thinners is only necessary for certain procedures. If you are unsure of your instructions, please call us.   Aspirin - 5 days  Plavix/Clopidogrel - 7 days  Warfarin / Coumadin - 5 days  Eliquis - 3 days  Pradaxa/Dabigatran - 4 days  Xarelto/Rivaroxaban - 3 days     If you are a diabetic, do not take your medication if you will be fasting, but bring it with you. Please plan on being here for roughly 2 hours.     Please call us if you have been sick (running fever, having any flu-like symptoms) or have been taking antibiotics in the past 2 weeks or had any outpatient procedures other than with us (colonoscopy, endoscopy, OBGYN, dental, etc.). If you have been previously COVID positive, you will need to hold off on your procedure until you are symptom free for 10 days. If you did not have any symptoms, you can have your procedure 10 days from your positive test result.       *HOLD ALL VITAMINS, MINERALS, HERBS (INCLUDING HERBAL TEAS) AND SUPPLEMENTS  *SHOWER WITH ANTIBACTERIAL SOAP (EX. DIAL) NIGHT BEFORE AND MORNING OF PROCEDURE  *DO NOT APPLY ANY LOTIONS, OILS, POWDERS, PERFUME/COLOGNE, OINTMENTS, GELS, CREAMS, MAKEUP OR DEODORANT TO YOUR SKIN MORNING OF PROCEDURE  *LEAVE JEWELRY AND ANY VALUABLES AT HOME  *WEAR LOOSE COMFORTABLE CLOTHING (PREFERABLY A BUTTON UP SHIRT)     Please reply to this message as receipt of delivery.     Thank you,  [facility]  [place], LPN [facility]  Pre-Admit

## 2024-03-13 ENCOUNTER — HOSPITAL ENCOUNTER (OUTPATIENT)
Facility: HOSPITAL | Age: 69
Discharge: HOME | End: 2024-03-13
Attending: STUDENT IN AN ORGANIZED HEALTH CARE EDUCATION/TRAINING PROGRAM | Admitting: STUDENT IN AN ORGANIZED HEALTH CARE EDUCATION/TRAINING PROGRAM
Payer: MEDICARE

## 2024-03-13 ENCOUNTER — PATIENT MESSAGE (OUTPATIENT)
Dept: PRIMARY CARE CLINIC | Facility: CLINIC | Age: 69
End: 2024-03-13
Payer: MEDICARE

## 2024-03-13 VITALS
OXYGEN SATURATION: 98 % | SYSTOLIC BLOOD PRESSURE: 137 MMHG | HEART RATE: 64 BPM | RESPIRATION RATE: 16 BRPM | DIASTOLIC BLOOD PRESSURE: 73 MMHG | WEIGHT: 196 LBS | HEIGHT: 66 IN | BODY MASS INDEX: 31.5 KG/M2 | TEMPERATURE: 99 F

## 2024-03-13 DIAGNOSIS — G89.29 CHRONIC PAIN: ICD-10-CM

## 2024-03-13 DIAGNOSIS — M54.16 LUMBAR RADICULOPATHY: Primary | ICD-10-CM

## 2024-03-13 DIAGNOSIS — M51.36 DDD (DEGENERATIVE DISC DISEASE), LUMBAR: ICD-10-CM

## 2024-03-13 LAB — POCT GLUCOSE: 99 MG/DL (ref 70–110)

## 2024-03-13 PROCEDURE — 64483 NJX AA&/STRD TFRM EPI L/S 1: CPT | Mod: LT

## 2024-03-13 PROCEDURE — 64484 NJX AA&/STRD TFRM EPI L/S EA: CPT | Mod: LT

## 2024-03-13 PROCEDURE — 25000003 PHARM REV CODE 250: Performed by: STUDENT IN AN ORGANIZED HEALTH CARE EDUCATION/TRAINING PROGRAM

## 2024-03-13 PROCEDURE — 63600175 PHARM REV CODE 636 W HCPCS: Performed by: STUDENT IN AN ORGANIZED HEALTH CARE EDUCATION/TRAINING PROGRAM

## 2024-03-13 PROCEDURE — 25500020 PHARM REV CODE 255: Performed by: STUDENT IN AN ORGANIZED HEALTH CARE EDUCATION/TRAINING PROGRAM

## 2024-03-13 RX ORDER — LIDOCAINE HYDROCHLORIDE 10 MG/ML
INJECTION INFILTRATION; PERINEURAL
Status: DISCONTINUED | OUTPATIENT
Start: 2024-03-13 | End: 2024-03-13 | Stop reason: HOSPADM

## 2024-03-13 RX ORDER — LIDOCAINE HYDROCHLORIDE 20 MG/ML
INJECTION, SOLUTION EPIDURAL; INFILTRATION; INTRACAUDAL; PERINEURAL
Status: DISCONTINUED | OUTPATIENT
Start: 2024-03-13 | End: 2024-03-13 | Stop reason: HOSPADM

## 2024-03-13 RX ORDER — SODIUM CHLORIDE 9 MG/ML
INJECTION, SOLUTION INTRAVENOUS CONTINUOUS
Status: DISCONTINUED | OUTPATIENT
Start: 2024-03-13 | End: 2024-03-13 | Stop reason: HOSPADM

## 2024-03-13 RX ORDER — DEXAMETHASONE SODIUM PHOSPHATE 10 MG/ML
INJECTION INTRAMUSCULAR; INTRAVENOUS
Status: DISCONTINUED | OUTPATIENT
Start: 2024-03-13 | End: 2024-03-13 | Stop reason: HOSPADM

## 2024-03-13 NOTE — DISCHARGE SUMMARY
Discharge Note  Short Stay      SUMMARY     Admit Date: 3/13/2024    Attending Physician: Glenny Mendoza      Discharge Physician: Glenny Mendoza      Discharge Date: 3/13/2024 1:33 PM    Procedure(s) (LRB):  LT L3-4 and L4-5 TFESI (Left)    Final Diagnosis: Lumbar radiculopathy, chronic [M54.16]    Disposition: Home or self care    Patient Instructions:   Current Discharge Medication List        CONTINUE these medications which have NOT CHANGED    Details   amLODIPine (NORVASC) 5 MG tablet Take 1 tablet (5 mg total) by mouth once daily.  Qty: 90 tablet, Refills: 3    Comments: .  Associated Diagnoses: Essential hypertension      atorvastatin (LIPITOR) 40 MG tablet Take 1 tablet (40 mg total) by mouth once daily.  Qty: 90 tablet, Refills: 3    Associated Diagnoses: Hyperlipidemia, unspecified hyperlipidemia type      doxycycline (MONODOX) 100 MG capsule Take 1 capsule (100 mg total) by mouth 2 (two) times daily. Take with food and water. Remain upright x 1 hr after taking.  Qty: 20 capsule, Refills: 0    Associated Diagnoses: Inflamed epidermoid cyst of skin      levothyroxine (SYNTHROID) 112 MCG tablet Take 1 tablet (112 mcg total) by mouth once daily.  Qty: 90 tablet, Refills: 3    Associated Diagnoses: Hypothyroidism, unspecified type      metFORMIN (GLUCOPHAGE) 500 MG tablet Take 1 tablet (500 mg total) by mouth daily with breakfast.  Qty: 90 tablet, Refills: 3      multivitamin capsule Take 1 capsule by mouth once daily.      valACYclovir (VALTREX) 500 MG tablet Take 1 tablet (500 mg total) by mouth once daily.  Qty: 30 tablet, Refills: 5    Associated Diagnoses: HSV (herpes simplex virus) infection      gentamicin (GARAMYCIN) 0.1 % ointment Apply topically 2 (two) times daily. To excision sites on back  Qty: 30 g, Refills: 1    Associated Diagnoses: Atypical nevus      scopolamine (TRANSDERM-SCOP) 1.3-1.5 mg (1 mg over 3 days) Place 1 patch onto the skin every 72 hours.  Qty: 5 patch, Refills: 1     Associated Diagnoses: Motion sickness, subsequent encounter                 Discharge Diagnosis: Lumbar radiculopathy, chronic [M54.16]  Condition on Discharge: Stable with no complications to procedure   Diet on Discharge: Same as before.  Activity: as per instruction sheet.  Discharge to: Home with a responsible adult.  Follow up: 2-4 weeks       Please call my office or pager at [phone] if experienced any weakness or loss of sensation, fever > 101.5, pain uncontrolled with oral medications, persistent nausea/vomiting/or diarrhea, redness or drainage from the incisions, or any other worrisome concerns. If physician on call was not reached or could not communicate with our office for any reason please go to the nearest emergency department

## 2024-03-13 NOTE — OP NOTE
Lumbar Transforaminal Epidural Steroid Injection under Fluoroscopic Guidance    The procedure, risks, benefits, and options were discussed with the patient. There are no contraindications to the procedure. The patent expressed understanding and agreed to the procedure. Informed written consent was obtained prior to the start of the procedure and can be found in the patient's chart.    PATIENT NAME: Ginger Marshall   MRN: [id number]     DATE OF PROCEDURE: 03/13/2024    PROCEDURE:  Left  L3/4 and L4/5 Lumbar Transforaminal Epidural Steroid Injection under Fluoroscopic Guidance    PRE-OP DIAGNOSIS: Lumbar radiculopathy, chronic [M54.16] Lumbar radiculopathy [M54.16]    POST-OP DIAGNOSIS: Same    PHYSICIAN: Glenny Mendoza DO    ASSISTANTS: None     MEDICATIONS INJECTED: Preservative-free Decadron 10mg with 5cc of Lidocaine 1% MPF     LOCAL ANESTHETIC INJECTED: Xylocaine 2%     SEDATION: None    ESTIMATED BLOOD LOSS: None    COMPLICATIONS: None    TECHNIQUE: Time-out was performed to identify the patient and procedure to be performed. With the patient laying in a prone position, the surgical area was prepped and draped in the usual sterile fashion using ChloraPrep and a fenestrated drape.The levels were determined under fluoroscopy guidance. Skin anesthesia was achieved by injecting Lidocaine 2% over the injection sites. The transforaminal spaces were then approached with a 22 gauge, 3.5 inch spinal quinke needle that was introduced under fluoroscopic guidance in the AP and Lateral views. Once the needle tip was in the area of the transforaminal space, and there was no blood, CSF or paraesthesias, contrast dye Omnipaque (300mg/mL) was injected to confirm placement and there was no vascular runoff. Fluoroscopic imaging in the AP and lateral views revealed a clear outline of the spinal nerve with proximal spread of agent through the neural foramen into the epidural space. 3 mL of the medication mixture listed above was  injected slowly at each site. Displacement of the radio opaque contrast after injection of the medication confirmed that the medication went into the area of the transforaminal spaces. The needles were removed and bleeding was nil. A sterile dressing was applied. No specimens collected. The patient tolerated the procedure well.       The patient was monitored after the procedure in the recovery area. They were given post-procedure and discharge instructions to follow at home. The patient was discharged in a stable condition.      Glenny Mendoza DO

## 2024-03-13 NOTE — DISCHARGE INSTRUCTIONS
Home Care Instructions Pain Management:    1.  DIET:    You may resume your normal diet today.    2.  BATHING:    You may shower with luke warm water.    3.  DRESSING:    You may remove your bandage today.    4.  ACTIVITY LEVEL:      You may resume your normal activities 24 hours after your procedure.    5.  MEDICATIONS:    You may resume your normal medications today.    6.  SPECIAL INSTRUCTIONS:    No heat to the injection site for 24 hours including bath or shower, heating pad, moist heat or hot tubs.    Use an ice pack to the injection site for any pain or discomfort.  Apply ice packs for 20 minute intervals as needed.    If you have received any sedatives by mouth today, you can not drive for 12 hours.    If you have received sedation through an IV, you can not drive for 24 hours.    PLEASE CALL YOUR DOCTOR FOR THE FOLLOWIN.  Redness or swelling around the injection site.  2.  Fever of 101 degrees.  3.  Drainage (pus) from the injection site.  4.  For any continuous bleeding (some dried blood over the incision is normal.)    FOR EMERGENCIES:    If any unusual problems or difficulties occur during clinic hours, call [phone] or dial [phone].    Follow up with with your physician in 2-3 weeks.

## 2024-03-18 ENCOUNTER — TELEPHONE (OUTPATIENT)
Dept: PAIN MEDICINE | Facility: CLINIC | Age: 69
End: 2024-03-18
Payer: MEDICARE

## 2024-03-28 ENCOUNTER — PATIENT MESSAGE (OUTPATIENT)
Dept: ADMINISTRATIVE | Facility: OTHER | Age: 69
End: 2024-03-28
Payer: MEDICARE

## 2024-03-28 ENCOUNTER — PATIENT MESSAGE (OUTPATIENT)
Dept: OTHER | Facility: OTHER | Age: 69
End: 2024-03-28
Payer: MEDICARE

## 2024-04-19 ENCOUNTER — TELEPHONE (OUTPATIENT)
Dept: NEUROSURGERY | Facility: CLINIC | Age: 69
End: 2024-04-19
Payer: MEDICARE

## 2024-04-19 DIAGNOSIS — M54.16 LUMBAR RADICULOPATHY: Primary | ICD-10-CM

## 2024-04-22 ENCOUNTER — HOSPITAL ENCOUNTER (OUTPATIENT)
Dept: CARDIOLOGY | Facility: HOSPITAL | Age: 69
Discharge: HOME | End: 2024-04-22
Attending: NURSE PRACTITIONER
Payer: MEDICARE

## 2024-04-22 DIAGNOSIS — R00.2 PALPITATIONS: ICD-10-CM

## 2024-04-22 PROCEDURE — 93225 XTRNL ECG REC<48 HRS REC: CPT

## 2024-04-22 PROCEDURE — 93227 XTRNL ECG REC<48 HR R&I: CPT | Mod: ,,, | Performed by: STUDENT IN AN ORGANIZED HEALTH CARE EDUCATION/TRAINING PROGRAM

## 2024-04-24 ENCOUNTER — HOSPITAL ENCOUNTER (OUTPATIENT)
Dept: RADIOLOGY | Facility: HOSPITAL | Age: 69
Discharge: HOME | End: 2024-04-24
Attending: NEUROLOGICAL SURGERY
Payer: MEDICARE

## 2024-04-24 ENCOUNTER — OFFICE VISIT (OUTPATIENT)
Dept: NEUROSURGERY | Facility: CLINIC | Age: 69
End: 2024-04-24
Payer: MEDICARE

## 2024-04-24 VITALS
WEIGHT: 196 LBS | HEIGHT: 66 IN | DIASTOLIC BLOOD PRESSURE: 74 MMHG | HEART RATE: 64 BPM | BODY MASS INDEX: 31.5 KG/M2 | SYSTOLIC BLOOD PRESSURE: 118 MMHG

## 2024-04-24 DIAGNOSIS — M48.061 FORAMINAL STENOSIS OF LUMBAR REGION: ICD-10-CM

## 2024-04-24 DIAGNOSIS — M54.16 LUMBAR RADICULOPATHY: ICD-10-CM

## 2024-04-24 DIAGNOSIS — M51.36 DDD (DEGENERATIVE DISC DISEASE), LUMBAR: ICD-10-CM

## 2024-04-24 DIAGNOSIS — M54.16 LUMBAR RADICULOPATHY, CHRONIC: ICD-10-CM

## 2024-04-24 DIAGNOSIS — M41.50 DEGENERATIVE SCOLIOSIS IN ADULT PATIENT: Primary | ICD-10-CM

## 2024-04-24 DIAGNOSIS — M48.062 SPINAL STENOSIS OF LUMBAR REGION WITH NEUROGENIC CLAUDICATION: ICD-10-CM

## 2024-04-24 LAB
OHS CV EVENT MONITOR DAY: 0
OHS CV HOLTER LENGTH DECIMAL HOURS: 47.98
OHS CV HOLTER LENGTH HOURS: 47
OHS CV HOLTER LENGTH MINUTES: 59
OHS CV HOLTER SINUS AVERAGE HR: 69
OHS CV HOLTER SINUS MAX HR: 107
OHS CV HOLTER SINUS MIN HR: 44

## 2024-04-24 PROCEDURE — 3078F DIAST BP <80 MM HG: CPT | Mod: CPTII,S$GLB,, | Performed by: NEUROLOGICAL SURGERY

## 2024-04-24 PROCEDURE — 3008F BODY MASS INDEX DOCD: CPT | Mod: CPTII,S$GLB,, | Performed by: NEUROLOGICAL SURGERY

## 2024-04-24 PROCEDURE — 72084 X-RAY EXAM ENTIRE SPI 6/> VW: CPT | Mod: 26,,, | Performed by: RADIOLOGY

## 2024-04-24 PROCEDURE — 1125F AMNT PAIN NOTED PAIN PRSNT: CPT | Mod: CPTII,S$GLB,, | Performed by: NEUROLOGICAL SURGERY

## 2024-04-24 PROCEDURE — 1159F MED LIST DOCD IN RCRD: CPT | Mod: CPTII,S$GLB,, | Performed by: NEUROLOGICAL SURGERY

## 2024-04-24 PROCEDURE — 99204 OFFICE O/P NEW MOD 45 MIN: CPT | Mod: S$GLB,,, | Performed by: NEUROLOGICAL SURGERY

## 2024-04-24 PROCEDURE — 72082 X-RAY EXAM ENTIRE SPI 2/3 VW: CPT | Mod: TC,FY

## 2024-04-24 PROCEDURE — 3288F FALL RISK ASSESSMENT DOCD: CPT | Mod: CPTII,S$GLB,, | Performed by: NEUROLOGICAL SURGERY

## 2024-04-24 PROCEDURE — 72110 X-RAY EXAM L-2 SPINE 4/>VWS: CPT | Mod: TC,FY,59

## 2024-04-24 PROCEDURE — 1101F PT FALLS ASSESS-DOCD LE1/YR: CPT | Mod: CPTII,S$GLB,, | Performed by: NEUROLOGICAL SURGERY

## 2024-04-24 PROCEDURE — 3074F SYST BP LT 130 MM HG: CPT | Mod: CPTII,S$GLB,, | Performed by: NEUROLOGICAL SURGERY

## 2024-04-24 PROCEDURE — 99999 PR PBB SHADOW E&M-EST. PATIENT-LVL III: CPT | Mod: PBBFAC,,, | Performed by: NEUROLOGICAL SURGERY

## 2024-04-24 RX ORDER — PREGABALIN 25 MG/1
25 CAPSULE ORAL 3 TIMES DAILY
Qty: 90 CAPSULE | Refills: 6 | Status: SHIPPED | OUTPATIENT
Start: 2024-04-24 | End: 2024-05-15

## 2024-04-24 NOTE — PROGRESS NOTES
Oswestry Low Back Pain Disability Questionnaire    DATE OF SERVICE:  04/24/2024    ATTENDING PHYSICIAN:  Mikie Bartlett MD    Instructions    This questionnaire has been designed to give us information as to how your back or leg pain is affecting your ability to manage in everyday life. Please answer by checking ONE box in each section for the statement which best applies to you. We realize you may consider that two or more statements in any one section apply but please just shade out the spot that indicates the statement which most clearly describes your problem.    Section 1 - Pain intensity    * I have no pain at the moment.  * The pain is very mild at the moment.  * The pain is moderate at the moment.  * The pain is fairly severe at the moment.  * The pain is very severe at the moment.  * The pain is the worst imaginable at the moment.    Score : 2    Section 2 - Personal care (washing, dressing etc)    * I can look after myself normally without causing extra pain.  * I can look after myself normally but it causes extra pain.  * It is painful to look after myself and I am slow and careful.  * I need some help but manage most of my personal care.  * I need help every day in most aspects of self-care.  * I do not get dressed, I wash with difficulty and stay in bed.    Score : 1    Section 3 - Lifting    * I can lift heavy weights without extra pain.  * I can lift heavy weights but it gives extra pain.  * Pain prevents me from lifting heavy weights off the floor, but I can manage if they are conveniently placed eg. on a table.  * Pain prevents me from lifting heavy weights, but I can manage light to medium weights if they are conveniently positioned.  * I can lift very light weights.  * I cannot lift or carry anything at all.    Score : 1    Section 4 - Walking    * Pain does not prevent me walking any distance.  * Pain prevents me from walking more than 1 mile.         * Pain prevents me from walking more than  1/2 mile.         * Pain prevents me from walking more than 100 yards.            * I can only walk using a stick or crutches.  * I am in bed most of the time.    Score : 0    Section 5 - Sitting    * I can sit in any chair as long as I like.  * I can only sit in my favourite chair as long as I like.  * Pain prevents me sitting more than one hour.  * Pain prevents me from sitting more than 30 minutes.  * Pain prevents me from sitting more than 10 minutes.  * Pain prevents me from sitting at all.    Score : 1    Section 6 - Standing    * I can stand as long as I want without extra pain.  * I can stand as long as I want but it gives me extra pain.  * Pain prevents me from standing for more than 1 hour  * Pain prevents me from standing for more than 30 minutes.  * Pain prevents me from standing for more than 10 minutes.  * Pain prevents me from standing at all.     Score : 1    Section 7 - Sleeping    * My sleep is never disturbed by pain.  * My sleep is occasionally disturbed by pain.  * Because of pain I have less than 6 hours sleep.   * Because of pain I have less than 4 hours sleep.   * Because of pain I have less than 2 hours sleep.  * Pain prevents me from sleeping at all.    Score : 1    Section 8 - Sex life (if applicable)    * My sex life is normal and causes no extra pain.  * My sex life is normal but causes some extra pain.  * My sex life is nearly normal but is very painful.   * My sex life is severely restricted by pain.  * My sex life is nearly absent because of pain.   * Pain prevents any sex life at all.    Score : 1    Section 9 - Social life    * My social life is normal and gives me no extra pain.  * My social life is normal but increases the degree of pain.  * Pain has no significant effect on my social life apart from limiting my more energetic interests eg, sport.  * Pain has restricted my social life and I do not go out as often.  * Pain has restricted my social life to my home.   * I have no  social life because of pain.     Score : 2    Section 10 - Travelling    * I can travel anywhere without pain.  * I can travel anywhere but it gives me extra pain.  * Pain is bad but I manage journeys over two hours.  * Pain restricts me to journeys of less than one hour.  * Pain restricts me to short necessary journeys under 30 minutes.  * Pain prevents me from travelling except to receive treatment.    Score : 1      TOTAL SCORE :  11 / 50 x 2 = 22 %      References  1. [place] RENETTA, Henrietta PB. The Oswestry Disability Index. Spine 2000 Nov 15;25(22):2945-52; discussion 52.  from standing for more than from standing for more than from standing for more than from standing at all

## 2024-04-24 NOTE — PROGRESS NOTES
NEUROSURGICAL OUTPATIENT CONSULTATION NOTE    DATE OF SERVICE:  2024    ATTENDING PHYSICIAN:  Mikie Bartlett MD    CONSULT REQUESTED BY:  Adrian MARQUEZ    REASON FOR CONSULT:  Lumbar radiculopathy, spinal stenosis      SUBJECTIVE:    HISTORY:  This is a 69 y.o. female who has been complaining of worsening left leg pain for more than 2 years.  She has been treated with physical therapy in 2022.  She also has received multiple spinal injections and epidural steroid injection with pain management.  She reports only mild to moderate pain relief with the spinal injections.  Pain is usually worse with prolonged standing or walking.  She has to sit down to have some relief.  Pain can be severe in intensity.  Pain is interfering with her quality of life and functional status.  Pain is mainly felt in the left buttock area down the lateral thigh above the knee.  The pain does not travel below-the-knee.  Pain is associated with numbness.  No motor weakness no sphincter dysfunction symptoms.  Has not tried neuropathic pain medicine.      PAST MEDICAL HISTORY:  Active Ambulatory Problems     Diagnosis Date Noted    Osteopenia of lumbar spine 2020    Hyperlipidemia 2020    Hypothyroidism 2020    Prediabetes 2020    Essential hypertension 10/02/2020    Risk factors for obstructive sleep apnea     NORBERT (obstructive sleep apnea)     Lumbar radiculopathy 2022    DDD (degenerative disc disease), lumbar 2023    Sacroiliitis 2024     Resolved Ambulatory Problems     Diagnosis Date Noted    Decreased functional mobility and endurance 2022     Past Medical History:   Diagnosis Date    Basal cell carcinoma 2020    Osteopenia        PAST SURGICAL HISTORY:  Past Surgical History:   Procedure Laterality Date    ADENOIDECTOMY  ?    Had tonsils removed, not sure about adenoids.     SECTION      COLONOSCOPY N/A 2022    Procedure: COLONOSCOPY;  Surgeon: Abelardo  ABRAM Dean MD;  Location: [facility] ENDO (4TH FLR);  Service: Endoscopy;  Laterality: N/A;  fully vaccinated, prep inst portal -ml  1/21-covid marii-tb    EPIDURAL STEROID INJECTION INTO LUMBAR SPINE Left 3/13/2024    Procedure: LT L3-4 and L4-5 TFESI;  Surgeon: Glenny Mendoza DO;  Location: [facility] PAIN MANAGEMENT;  Service: Pain Management;  Laterality: Left;  20 mins    EYE SURGERY  LASIK/ RETINAL TEAR 2015    Cataract surgery    INJECTION, SACROILIAC JOINT Left 10/13/2022    Procedure: Left SI Joint Injection and Left GTB Injection;  Surgeon: Beny Villalobos Jr., MD;  Location: [facility] PAIN MGT;  Service: Pain Management;  Laterality: Left;    INJECTION, SACROILIAC JOINT Left 03/02/2023    Procedure: INJECTION,SACROILIAC JOINT left;  Surgeon: Kasron Farris MD;  Location: [facility] PAIN MGT;  Service: Pain Management;  Laterality: Left;    LASIK      TONSILLECTOMY      TRANSFORAMINAL EPIDURAL INJECTION OF STEROID Left 07/26/2023    Procedure: INJECTION, STEROID, EPIDURAL, TRANSFORAMINAL APPROACH;  Surgeon: Glenny Mendoza DO;  Location: [facility] PAIN MANAGEMENT;  Service: Pain Management;  Laterality: Left;       SOCIAL HISTORY:   Social History     Socioeconomic History    Marital status:    Tobacco Use    Smoking status: Never     Passive exposure: Never    Smokeless tobacco: Never   Substance and Sexual Activity    Alcohol use: Yes     Alcohol/week: 4.0 standard drinks of alcohol     Types: 2 Cans of beer, 2 Drinks containing 0.5 oz of alcohol per week     Comment: Social drinker    Drug use: Never    Sexual activity: Yes     Partners: Male     Birth control/protection: Post-menopausal, None     Comment: Post menopause     Social Determinants of Health     Financial Resource Strain: Low Risk  (1/16/2024)    Overall Financial Resource Strain (CARDIA)     Difficulty of Paying Living Expenses: Not hard at all   Food Insecurity: No Food Insecurity (1/16/2024)    Hunger Vital Sign     Worried About Running Out  of Food in the Last Year: Never true     Ran Out of Food in the Last Year: Never true   Transportation Needs: No Transportation Needs (2024)    PRAPARE - Transportation     Lack of Transportation (Medical): No     Lack of Transportation (Non-Medical): No   Physical Activity: Insufficiently Active (2024)    Exercise Vital Sign     Days of Exercise per Week: 1 day     Minutes of Exercise per Session: 30 min   Stress: No Stress Concern Present (2024)    [place] [employer] - Occupational Stress Questionnaire     Feeling of Stress : Not at all   Social Connections: Unknown (2024)    Social Connection and Isolation Panel [NHANES]     Frequency of Communication with Friends and Family: More than three times a week     Frequency of Social Gatherings with Friends and Family: More than three times a week     Active Member of Clubs or Organizations: Patient declined     Attends Club or Organization Meetings: Patient declined     Marital Status:    Housing Stability: Low Risk  (2024)    Housing Stability Vital Sign     Unable to Pay for Housing in the Last Year: No     Number of Places Lived in the Last Year: 1     Unstable Housing in the Last Year: No       FAMILY HISTORY:  Family History   Problem Relation Name Age of Onset    Brain cancer Mother Kristin Cleveland     Cancer Mother Kristin [place]          of brain cancer    Early death Mother Kristin [place]          at age 54 of brain cancer    Stroke Father Gianluca         Age 74    Breast cancer Other Jemima Niece     Breast cancer Other Janene Niece     Arthritis Sister Kandis     Hyperlipidemia Sister Kandis         High cholesterol    Arthritis Sister Barrientos     Hyperlipidemia Sister Barrientos         High cholesterol    Stroke Sister Barrientos         Several strokes caused severe short term memory problems    Stroke Paternal Aunt Oriana         Stroke caused death       CURRENTS MEDICATIONS:  Current Outpatient Medications on File Prior to  Visit   Medication Sig Dispense Refill    amLODIPine (NORVASC) 5 MG tablet Take 1 tablet (5 mg total) by mouth once daily. 90 tablet 3    atorvastatin (LIPITOR) 40 MG tablet Take 1 tablet (40 mg total) by mouth once daily. 90 tablet 3    levothyroxine (SYNTHROID) 112 MCG tablet Take 1 tablet (112 mcg total) by mouth once daily. 90 tablet 3    metFORMIN (GLUCOPHAGE) 500 MG tablet Take 1 tablet (500 mg total) by mouth daily with breakfast. 90 tablet 3    multivitamin capsule Take 1 capsule by mouth once daily.      valACYclovir (VALTREX) 500 MG tablet Take 1 tablet (500 mg total) by mouth once daily. 30 tablet 5    doxycycline (MONODOX) 100 MG capsule Take 1 capsule (100 mg total) by mouth 2 (two) times daily. Take with food and water. Remain upright x 1 hr after taking. 20 capsule 0    gentamicin (GARAMYCIN) 0.1 % ointment Apply topically 2 (two) times daily. To excision sites on back 30 g 1    scopolamine (TRANSDERM-SCOP) 1.3-1.5 mg (1 mg over 3 days) Place 1 patch onto the skin every 72 hours. 5 patch 1     No current facility-administered medications on file prior to visit.       ALLERGIES:  Review of patient's allergies indicates:  No Known Allergies    REVIEW OF SYSTEMS:  Review of Systems   Constitutional:  Negative for diaphoresis, fever and weight loss.   Respiratory:  Negative for shortness of breath.    Cardiovascular:  Negative for chest pain.   Gastrointestinal:  Negative for blood in stool.   Genitourinary:  Negative for hematuria.   Endo/Heme/Allergies:  Does not bruise/bleed easily.   All other systems reviewed and are negative.      OBJECTIVE:    PHYSICAL EXAMINATION:   Vitals:    04/24/24 1426   BP: 118/74   Pulse: 64       Physical Exam:  Vitals reviewed.    Constitutional: She appears well-developed and well-nourished.     Eyes: Pupils are equal, round, and reactive to light. Conjunctivae and EOM are normal.     Cardiovascular: Normal distal pulses and no edema.     Abdominal: Soft.     Skin: Skin  displays no rash on trunk and no rash on extremities. Skin displays no lesions on trunk and no lesions on extremities.     Psych/Behavior: She is alert. She is oriented to person, place, and time. She has a normal mood and affect.     Musculoskeletal:        Neck: Range of motion is full.     Neurological:        DTRs: Tricep reflexes are 2+ on the right side and 2+ on the left side. Bicep reflexes are 2+ on the right side and 2+ on the left side. Brachioradialis reflexes are 2+ on the right side and 2+ on the left side. Patellar reflexes are 2+ on the right side and 2+ on the left side. Achilles reflexes are 0 on the right side and 0 on the left side.       Back Exam     Muscle Strength   Right Quadriceps:  5/5   Left Quadriceps:  5/5   Right Hamstrings:  5/5   Left Hamstrings:  5/5               Neurologic Exam     Mental Status   Oriented to person, place, and time.   Speech: speech is normal   Level of consciousness: alert    Cranial Nerves   Cranial nerves II through XII intact.     CN III, IV, VI   Pupils are equal, round, and reactive to light.  Extraocular motions are normal.     Motor Exam   Muscle bulk: normal  Overall muscle tone: normal    Strength   Right deltoid: 5/5  Left deltoid: 5/5  Right biceps: 5/5  Left biceps: 5/5  Right triceps: 5/5  Left triceps: 5/5  Right wrist flexion: 5/5  Left wrist flexion: 5/5  Right wrist extension: 5/5  Left wrist extension: 5/5  Right interossei: 5/5  Left interossei: 5/5  Right iliopsoas: 5/5  Left iliopsoas: 5/5  Right quadriceps: 5/5  Left quadriceps: 5/5  Right hamstrin/5  Left hamstrin/5  Right anterior tibial: 5/5  Left anterior tibial: 5/5  Right posterior tibial: 5/5  Left posterior tibial: 5/5  Right peroneal: 5/5  Left peroneal: 5/5  Right gastroc: 5/5  Left gastroc: 5/5    Sensory Exam   Light touch normal.   Pinprick normal.     Gait, Coordination, and Reflexes     Gait  Gait: normal    Coordination   Finger to nose coordination: normal  Tandem  walking coordination: normal    Reflexes   Right brachioradialis: 2+  Left brachioradialis: 2+  Right biceps: 2+  Left biceps: 2+  Right triceps: 2+  Left triceps: 2+  Right patellar: 2+  Left patellar: 2+  Right achilles: 0  Left achilles: 0  Right plantar: normal  Left plantar: normal  Right Urias: absent  Left Urias: absent  Right ankle clonus: absent  Left ankle clonus: absent        DIAGNOSTIC DATA:  I personally interpreted the following imaging:   Lumbar spine MRI showing L4-5 severe spinal stenosis with facet arthropathy and severe left foraminal stenosis, L5-S1 moderate left foraminal stenosis, facet arthropathy    Scoliosis parameters    Degenerative scoliosis, fractional curve from L4-S1 causing left L4-5 and L5-S1 foraminal stenosis, main curve from L1-L4, left convexity, measured at 15 degree, L4-5 grade 1 degenerative spondylolisthesis        ASSESSMENT:  This is a 69 y.o. female with     Problem List Items Addressed This Visit          Neuro    DDD (degenerative disc disease), lumbar     Other Visit Diagnoses       Degenerative scoliosis in adult patient    -  Primary    Lumbar radiculopathy, chronic        Foraminal stenosis of lumbar region        Spinal stenosis of lumbar region with neurogenic claudication                PLAN:  We will try Lyrica 25 mg 3 times daily.  The patient was instructed to contact me on the portal for increasing the dose in 7 days as needed.    Regarding her lumbar radiculopathy and spinal stenosis issue.  I think she would be a candidate for a left L4-5 oblique, L5-S1 anterior interbody fusion, placement of interbody spacers, L4-S1 posterior segmental instrumentation and L4-5 minimally invasive laminectomy.  I think a lumbar fusion is required due to her degenerative scoliosis, severe foraminal stenosis and spondylolisthesis.  In order to achieve a good decompression a lumbar fusion is necessary    Patient is considering surgical options.  She wants to try the Lyrica  1st.  She will reach out to me on the portal.  All questions answered        Mikie Bartlett MD  Cell:[phone]

## 2024-04-25 DIAGNOSIS — R00.2 PALPITATIONS: Primary | ICD-10-CM

## 2024-05-14 ENCOUNTER — PATIENT MESSAGE (OUTPATIENT)
Dept: ADMINISTRATIVE | Facility: OTHER | Age: 69
End: 2024-05-14
Payer: MEDICARE

## 2024-05-14 ENCOUNTER — PATIENT MESSAGE (OUTPATIENT)
Dept: PRIMARY CARE CLINIC | Facility: CLINIC | Age: 69
End: 2024-05-14
Payer: MEDICARE

## 2024-05-14 ENCOUNTER — PATIENT MESSAGE (OUTPATIENT)
Dept: NEUROSURGERY | Facility: CLINIC | Age: 69
End: 2024-05-14
Payer: MEDICARE

## 2024-05-15 RX ORDER — PREGABALIN 50 MG/1
50 CAPSULE ORAL 3 TIMES DAILY
Qty: 90 CAPSULE | Refills: 6 | Status: SHIPPED | OUTPATIENT
Start: 2024-05-15 | End: 2024-11-13

## 2024-05-16 ENCOUNTER — OFFICE VISIT (OUTPATIENT)
Dept: PRIMARY CARE CLINIC | Facility: CLINIC | Age: 69
End: 2024-05-16
Payer: MEDICARE

## 2024-05-16 VITALS
HEART RATE: 68 BPM | HEIGHT: 66 IN | BODY MASS INDEX: 33.38 KG/M2 | SYSTOLIC BLOOD PRESSURE: 126 MMHG | OXYGEN SATURATION: 96 % | DIASTOLIC BLOOD PRESSURE: 70 MMHG | WEIGHT: 207.69 LBS

## 2024-05-16 DIAGNOSIS — E11.9 TYPE 2 DIABETES MELLITUS WITHOUT COMPLICATION, WITHOUT LONG-TERM CURRENT USE OF INSULIN: ICD-10-CM

## 2024-05-16 DIAGNOSIS — R60.0 PEDAL EDEMA: Primary | ICD-10-CM

## 2024-05-16 PROCEDURE — 99999 PR PBB SHADOW E&M-EST. PATIENT-LVL III: CPT | Mod: PBBFAC,,, | Performed by: INTERNAL MEDICINE

## 2024-05-16 PROCEDURE — 1159F MED LIST DOCD IN RCRD: CPT | Mod: CPTII,S$GLB,, | Performed by: INTERNAL MEDICINE

## 2024-05-16 PROCEDURE — 3008F BODY MASS INDEX DOCD: CPT | Mod: CPTII,S$GLB,, | Performed by: INTERNAL MEDICINE

## 2024-05-16 PROCEDURE — 99213 OFFICE O/P EST LOW 20 MIN: CPT | Mod: S$GLB,,, | Performed by: INTERNAL MEDICINE

## 2024-05-16 PROCEDURE — 3074F SYST BP LT 130 MM HG: CPT | Mod: CPTII,S$GLB,, | Performed by: INTERNAL MEDICINE

## 2024-05-16 PROCEDURE — 3078F DIAST BP <80 MM HG: CPT | Mod: CPTII,S$GLB,, | Performed by: INTERNAL MEDICINE

## 2024-05-16 NOTE — Clinical Note
Saw our mutual patient today, good pain response to lyrica but significnat pedal edema since starting, I suggested changing to gabapentin, I dont know if you have a preference  Both list peripheral edema as possible side effect but the rate was lower for gabapentin?

## 2024-05-16 NOTE — PROGRESS NOTES
[facility] Note    Chief Complaint      Chief Complaint   Patient presents with    Foot Swelling     Left x 2 weeks      History of Present Illness      Ginger Marshall is a 69 y.o. female who presents today for chief complaint pedal edema .   HPI   Pedal edeam l>r x2 weeks since starting lyrica, peripheral edema is listed as a side effect   Non painful, no edema on awakening   Active Problem List with Overview Notes    Diagnosis Date Noted    Type 2 diabetes mellitus without complication, without long-term current use of insulin 2024    Sacroiliitis 2024    DDD (degenerative disc disease), lumbar 2023    Lumbar radiculopathy 2022    NORBERT (obstructive sleep apnea)     Risk factors for obstructive sleep apnea     Essential hypertension 10/02/2020    Prediabetes 2020    Osteopenia of lumbar spine 2020    Hyperlipidemia 2020    Hypothyroidism 2020       Health Maintenance   Topic Date Due    Mammogram  2024    DEXA Scan  2025    Lipid Panel  2028    Colorectal Cancer Screening  2029    TETANUS VACCINE  10/26/2032    Hepatitis C Screening  Completed    Shingles Vaccine  Completed       Past Medical History:   Diagnosis Date    Basal cell carcinoma 2020    forehead    Osteopenia        Past Surgical History:   Procedure Laterality Date    ADENOIDECTOMY  ?    Had tonsils removed, not sure about adenoids.     SECTION      COLONOSCOPY N/A 2022    Procedure: COLONOSCOPY;  Surgeon: Abelardo Dean MD;  Location: [address]);  Service: Endoscopy;  Laterality: N/A;  fully vaccinated, prep inst portal -ml  -covid marii-tb    EPIDURAL STEROID INJECTION INTO LUMBAR SPINE Left 3/13/2024    Procedure: LT L3-4 and L4-5 TFESI;  Surgeon: Glenny Mendoza DO;  Location: [facility] PAIN MANAGEMENT;  Service: Pain Management;  Laterality: Left;  20 mins    EYE SURGERY  LASIK/ RETINAL TEAR     Cataract surgery     INJECTION, SACROILIAC JOINT Left 10/13/2022    Procedure: Left SI Joint Injection and Left GTB Injection;  Surgeon: Beny Villalobos Jr., MD;  Location: [facility] PAIN T;  Service: Pain Management;  Laterality: Left;    INJECTION, SACROILIAC JOINT Left 03/02/2023    Procedure: INJECTION,SACROILIAC JOINT left;  Surgeon: Karson Farris MD;  Location: [facility] PAIN T;  Service: Pain Management;  Laterality: Left;    LASIK      TONSILLECTOMY      TRANSFORAMINAL EPIDURAL INJECTION OF STEROID Left 07/26/2023    Procedure: INJECTION, STEROID, EPIDURAL, TRANSFORAMINAL APPROACH;  Surgeon: Glenny Mendoza DO;  Location: [facility] PAIN MANAGEMENT;  Service: Pain Management;  Laterality: Left;       family history includes Arthritis in her sister and sister; Brain cancer in her mother; Breast cancer in some other family members; Cancer in her mother; Early death in her mother; Hyperlipidemia in her sister and sister; Stroke in her father, paternal aunt, and sister.    Social History     Tobacco Use    Smoking status: Never     Passive exposure: Never    Smokeless tobacco: Never   Substance Use Topics    Alcohol use: Yes     Alcohol/week: 4.0 standard drinks of alcohol     Types: 2 Cans of beer, 2 Drinks containing 0.5 oz of alcohol per week     Comment: Social drinker    Drug use: Never       Review of Systems   Constitutional:  Negative for chills, fever, malaise/fatigue and weight loss.   Respiratory:  Negative for cough, sputum production, shortness of breath and wheezing.    Cardiovascular:  Positive for leg swelling. Negative for chest pain, palpitations and orthopnea.   Gastrointestinal:  Negative for constipation, diarrhea, nausea and vomiting.   Genitourinary:  Negative for dysuria, frequency, hematuria and urgency.        Outpatient Encounter Medications as of 5/16/2024   Medication Sig Note Dispense Refill    amLODIPine (NORVASC) 5 MG tablet Take 1 tablet (5 mg total) by mouth once daily.  90 tablet 3    atorvastatin  "(LIPITOR) 40 MG tablet Take 1 tablet (40 mg total) by mouth once daily.  90 tablet 3    levothyroxine (SYNTHROID) 112 MCG tablet Take 1 tablet (112 mcg total) by mouth once daily.  90 tablet 3    metFORMIN (GLUCOPHAGE) 500 MG tablet Take 1 tablet (500 mg total) by mouth daily with breakfast.  90 tablet 3    multivitamin capsule Take 1 capsule by mouth once daily. 3/12/2024: HOLD AM OF PROCEDURE          pregabalin (LYRICA) 50 MG capsule Take 1 capsule (50 mg total) by mouth 3 (three) times daily.  90 capsule 6    valACYclovir (VALTREX) 500 MG tablet Take 1 tablet (500 mg total) by mouth once daily.  30 tablet 5     No facility-administered encounter medications on file as of 5/16/2024.        Review of patient's allergies indicates:  No Known Allergies        Physical Exam      Vital Signs  Pulse: 68  SpO2: 96 %  BP: 126/70  BP Location: Left arm  Patient Position: Sitting  Height and Weight  Height: 5' 6" (167.6 cm)  Weight: 94.2 kg (207 lb 10.8 oz)  BSA (Calculated - sq m): 2.09 sq meters  BMI (Calculated): 33.5  Weight in (lb) to have BMI = 25: 154.6]    Physical Exam  Vitals reviewed.   Constitutional:       General: She is not in acute distress.     Appearance: She is well-developed. She is not diaphoretic.   HENT:      Head: Normocephalic and atraumatic.      Right Ear: External ear normal.      Left Ear: External ear normal.      Nose: Nose normal.   Eyes:      General:         Right eye: No discharge.         Left eye: No discharge.      Conjunctiva/sclera: Conjunctivae normal.   Pulmonary:      Effort: Pulmonary effort is normal. No respiratory distress.   Musculoskeletal:         General: Normal range of motion.      Cervical back: Normal range of motion.      Right lower leg: Edema present.      Left lower leg: Edema present.   Skin:     Coloration: Skin is not pale.      Findings: No rash.   Neurological:      Mental Status: She is alert and oriented to person, place, and time.   Psychiatric:         " "Behavior: Behavior normal.         Thought Content: Thought content normal.          Laboratory:  CBC:  No results for input(s): "WBC", "RBC", "HGB", "HCT", "PLT", "MCV", "MCH", "MCHC" in the last 2160 hours.  CMP:  No results for input(s): "GLU", "CALCIUM", "ALBUMIN", "PROT", "NA", "K", "CO2", "CL", "BUN", "ALKPHOS", "ALT", "AST", "BILITOT" in the last 2160 hours.    Invalid input(s): "CREATININ"  URINALYSIS:  No results for input(s): "COLORU", "CLARITYU", "SPECGRAV", "PHUR", "PROTEINUA", "GLUCOSEU", "BILIRUBINCON", "BLOODU", "WBCU", "RBCU", "BACTERIA", "MUCUS", "NITRITE", "LEUKOCYTESUR", "UROBILINOGEN", "HYALINECASTS" in the last 2160 hours.   LIPIDS:  No results for input(s): "TSH", "HDL", "CHOL", "TRIG", "LDLCALC", "CHOLHDL", "NONHDLCHOL", "TOTALCHOLEST" in the last 2160 hours.  TSH:  No results for input(s): "TSH" in the last 2160 hours.  A1C:  No results for input(s): "HGBA1C" in the last 2160 hours.    Assessment/Plan     Ginger Marshall is a 69 y.o.female with:    1. Pedal edema    2. Type 2 diabetes mellitus without complication, without long-term current use of insulin  Assessment & Plan:  Last A1c at goal        Message sent to Dr Bartlett,, suggest change to gabapentin vs add lasix as last resort   Encouraged compression and elevation     Use of the [facility] Patient Portal discussed and encouraged during today's visit  -Continue current medications and maintain follow up with specialists.   Future Appointments   Date Time Provider Department Center   6/4/2024 11:00 AM Jacques Rasheed MD [facility]  [place] Leobardo Zamorano MD  5/16/2024 1:41 PM    Primary Care Internal Medicine                     "

## 2024-05-30 NOTE — PROGRESS NOTES
[facility] Card[facility] [address]     SUBJECTIVE:     History of Present Illness:  Patient is a 69 y.o. female presents with abnormal heart monitor study. Had a feeling of irregularity of her heart beat, and the apple watch showed atrial fibrillation and had monitor placed. She has had this for years     Primary Diagnosis:   Hypertension: positive  DM: prediabetic  Smoker: none  Family history of early CAD:  none  Heart disease: none   ROS  No chest pains  No shortness of breath; no PND or orthopnea  No syncope  Still has palpitations but not often; ignores and goes away  Activity: no restrictions  Blood pressures normal at home  Review of patient's allergies indicates:  No Known Allergies    Past Medical History:   Diagnosis Date    Basal cell carcinoma 2020    forehead    Osteopenia        Past Surgical History:   Procedure Laterality Date    ADENOIDECTOMY  ?    Had tonsils removed, not sure about adenoids.     SECTION      COLONOSCOPY N/A 2022    Procedure: COLONOSCOPY;  Surgeon: Abelardo Dean MD;  Location: [address]);  Service: Endoscopy;  Laterality: N/A;  fully vaccinated, prep inst portal -ml  -covid marii-tb    EPIDURAL STEROID INJECTION INTO LUMBAR SPINE Left 3/13/2024    Procedure: LT L3-4 and L4-5 TFESI;  Surgeon: Glenny Mendoza DO;  Location: [facility] PAIN MANAGEMENT;  Service: Pain Management;  Laterality: Left;  20 mins    EYE SURGERY  LASIK/ RETINAL TEAR     Cataract surgery    INJECTION, SACROILIAC JOINT Left 10/13/2022    Procedure: Left SI Joint Injection and Left GTB Injection;  Surgeon: Beny Villalobos Jr., MD;  Location: [facility] PAIN T;  Service: Pain Management;  Laterality: Left;    INJECTION, SACROILIAC JOINT Left 2023    Procedure: INJECTION,SACROILIAC JOINT left;  Surgeon: Karson Farris MD;  Location: [facility] PAIN MGT;  Service: Pain Management;  Laterality: Left;    LASIK      TONSILLECTOMY      TRANSFORAMINAL EPIDURAL INJECTION OF STEROID Left  "07/26/2023    Procedure: INJECTION, STEROID, EPIDURAL, TRANSFORAMINAL APPROACH;  Surgeon: Glenny Mendoza DO;  Location: [facility] PAIN MANAGEMENT;  Service: Pain Management;  Laterality: Left;           Past Hospitalization:         Cardiac meds:    Amlodipine 5 mg  Atorvastatin 40 mg  thyroid      OBJECTIVE:     Vital Signs (Most Recent)  Vitals:    06/04/24 1052   BP: 118/78   Pulse: 61   SpO2: 95%   Weight: 94 kg (207 lb 3.7 oz)   Height: 5' 6" (1.676 m)         Physical Exam:  Neck: normal carotids, no bruits; normal JVP  Lungs :clear  Heart: RR, normal S1,S2, soft systolic  murmur LLSB, no gallops  Abd: no masses; no bruits;   Exts: normal DP and PT pulses bilaterally, normal radials; no edema noted               Labs  9/23: A1c 5.8, ,     Diagnostic Results:    1.EKG: 3/24: normal  2.Echo: stress echo: 2020: negative for ischemia; normal EF   3. Stress test  4. cath  5. Holter 4/24: sinus; PAC's PVC's : premature beats are actually sinus arryhthmia with pauses longest being 1.7 seconds  which is from sinus arrhythmia   Chart review:        ASSESSMENT/PLAN:     Hypertension: controlled  No symptoms of angina or failure  Lower extremity trace edema from amlodipine  Soft systolic murmur: most likely flow   5. Prediabetic: getting controlled  6. PVC's and PAC's mostly sinus arrhythmia: asymptomatic  Plan: reassurance  Return as needed     Jacques Rasheed MD    "

## 2024-06-04 ENCOUNTER — OFFICE VISIT (OUTPATIENT)
Dept: CARDIOLOGY | Facility: CLINIC | Age: 69
End: 2024-06-04
Payer: MEDICARE

## 2024-06-04 VITALS
OXYGEN SATURATION: 95 % | HEART RATE: 61 BPM | DIASTOLIC BLOOD PRESSURE: 78 MMHG | WEIGHT: 207.25 LBS | SYSTOLIC BLOOD PRESSURE: 118 MMHG | BODY MASS INDEX: 33.31 KG/M2 | HEIGHT: 66 IN

## 2024-06-04 DIAGNOSIS — I49.49 PREMATURE BEATS: Primary | ICD-10-CM

## 2024-06-04 DIAGNOSIS — R00.2 PALPITATIONS: ICD-10-CM

## 2024-06-04 DIAGNOSIS — I49.8 SINUS ARRHYTHMIA: ICD-10-CM

## 2024-06-04 PROCEDURE — 1160F RVW MEDS BY RX/DR IN RCRD: CPT | Mod: CPTII,S$GLB,, | Performed by: INTERNAL MEDICINE

## 2024-06-04 PROCEDURE — 1101F PT FALLS ASSESS-DOCD LE1/YR: CPT | Mod: CPTII,S$GLB,, | Performed by: INTERNAL MEDICINE

## 2024-06-04 PROCEDURE — 99999 PR PBB SHADOW E&M-EST. PATIENT-LVL III: CPT | Mod: PBBFAC,,, | Performed by: INTERNAL MEDICINE

## 2024-06-04 PROCEDURE — 99204 OFFICE O/P NEW MOD 45 MIN: CPT | Mod: S$GLB,,, | Performed by: INTERNAL MEDICINE

## 2024-06-04 PROCEDURE — 1159F MED LIST DOCD IN RCRD: CPT | Mod: CPTII,S$GLB,, | Performed by: INTERNAL MEDICINE

## 2024-06-04 PROCEDURE — 1125F AMNT PAIN NOTED PAIN PRSNT: CPT | Mod: CPTII,S$GLB,, | Performed by: INTERNAL MEDICINE

## 2024-06-04 PROCEDURE — 3074F SYST BP LT 130 MM HG: CPT | Mod: CPTII,S$GLB,, | Performed by: INTERNAL MEDICINE

## 2024-06-04 PROCEDURE — 3078F DIAST BP <80 MM HG: CPT | Mod: CPTII,S$GLB,, | Performed by: INTERNAL MEDICINE

## 2024-06-04 PROCEDURE — 3288F FALL RISK ASSESSMENT DOCD: CPT | Mod: CPTII,S$GLB,, | Performed by: INTERNAL MEDICINE

## 2024-06-04 PROCEDURE — 3008F BODY MASS INDEX DOCD: CPT | Mod: CPTII,S$GLB,, | Performed by: INTERNAL MEDICINE

## 2024-06-15 ENCOUNTER — PATIENT MESSAGE (OUTPATIENT)
Dept: PRIMARY CARE CLINIC | Facility: CLINIC | Age: 69
End: 2024-06-15
Payer: MEDICARE

## 2024-06-15 DIAGNOSIS — Z91.89 AT RISK FOR INFECTIOUS DISEASE DUE TO RECENT FOREIGN TRAVEL: Primary | ICD-10-CM

## 2024-06-17 RX ORDER — ATOVAQUONE AND PROGUANIL HYDROCHLORIDE 250; 100 MG/1; MG/1
4 TABLET, FILM COATED ORAL DAILY
Qty: 25 TABLET | Refills: 0 | Status: SHIPPED | OUTPATIENT
Start: 2024-06-17

## 2024-06-17 NOTE — TELEPHONE ENCOUNTER
Pt requesting malaria med for upcoming trip to [facility], pended travel clinic referral if necessary

## 2024-06-19 ENCOUNTER — PATIENT OUTREACH (OUTPATIENT)
Dept: ADMINISTRATIVE | Facility: HOSPITAL | Age: 69
End: 2024-06-19
Payer: MEDICARE

## 2024-06-19 DIAGNOSIS — Z12.31 OTHER SCREENING MAMMOGRAM: Primary | ICD-10-CM

## 2024-06-19 NOTE — PROGRESS NOTES
Health Maintenance Due   Topic Date Due    COVID-19 Vaccine (7 - 2023-24 season) 02/12/2024    Mammogram  06/08/2024     Chart review completed. HM Updated. Triggered Links. Immunizations reviewed and updated. Care Everywhere Updated. Care Team Updated.  Placed mammogram order. Outreached patient via portal. Appointment scheduled for 6/27/24.

## 2024-06-27 ENCOUNTER — HOSPITAL ENCOUNTER (OUTPATIENT)
Dept: RADIOLOGY | Facility: HOSPITAL | Age: 69
Discharge: HOME | End: 2024-06-27
Attending: INTERNAL MEDICINE
Payer: MEDICARE

## 2024-06-27 DIAGNOSIS — Z12.31 OTHER SCREENING MAMMOGRAM: ICD-10-CM

## 2024-06-27 PROCEDURE — 77067 SCR MAMMO BI INCL CAD: CPT | Mod: TC

## 2024-07-21 DIAGNOSIS — B00.9 HSV (HERPES SIMPLEX VIRUS) INFECTION: ICD-10-CM

## 2024-07-23 RX ORDER — VALACYCLOVIR HYDROCHLORIDE 500 MG/1
500 TABLET, FILM COATED ORAL
Qty: 30 TABLET | Refills: 5 | Status: SHIPPED | OUTPATIENT
Start: 2024-07-23

## 2024-07-23 NOTE — TELEPHONE ENCOUNTER
Encounter Date: 01/17/2024    HSV (herpes simplex virus) infection  -     valACYclovir (VALTREX) 500 MG tablet; Take 1 tablet (500 mg total) by mouth once daily.  Dispense: 30 tablet; Refill: 5

## 2024-08-11 DIAGNOSIS — E78.5 HYPERLIPIDEMIA, UNSPECIFIED HYPERLIPIDEMIA TYPE: ICD-10-CM

## 2024-08-11 DIAGNOSIS — I10 ESSENTIAL HYPERTENSION: ICD-10-CM

## 2024-08-11 NOTE — TELEPHONE ENCOUNTER
Care Due:                  Date            Visit Type   Department     Provider  --------------------------------------------------------------------------------                                EP -                              PRIMARY      [facility] PRIMARY  Last Visit: 05-      CARE ([facility])   TONY Zamorano  Next Visit: None Scheduled  None         None Found                                                            Last  Test          Frequency    Reason                     Performed    Due Date  --------------------------------------------------------------------------------    CMP.........  12 months..  atorvastatin, metFORMIN..  09- 09-    HBA1C.......  6 months...  metFORMIN................  09- 03-    Lipid Panel.  12 months..  atorvastatin.............  09- 09-    TSH.........  12 months..  levothyroxine............  09- 09-    Health [facility] Embedded Care Due Messages. Reference number: [id number].   8/11/2024 5:21:43 AM CDT

## 2024-08-12 RX ORDER — ATORVASTATIN CALCIUM 40 MG/1
40 TABLET, FILM COATED ORAL
Qty: 90 TABLET | Refills: 0 | Status: SHIPPED | OUTPATIENT
Start: 2024-08-12

## 2024-08-12 RX ORDER — AMLODIPINE BESYLATE 5 MG/1
5 TABLET ORAL
Qty: 90 TABLET | Refills: 3 | Status: SHIPPED | OUTPATIENT
Start: 2024-08-12

## 2024-08-12 NOTE — TELEPHONE ENCOUNTER
Provider Staff:  Action required for this patient     Please see care gap opportunities below in Care Due Message.    Thanks!  [facility]     Appointments      Date Provider   Last Visit   5/16/2024 Deysi Zamorano MD   Next Visit   Visit date not found Deysi Zamorano MD      Refill Decision Note   Ginger Marshall  is requesting a refill authorization.  Brief Assessment and Rationale for Refill:  Approve     Medication Therapy Plan:         Comments:     Note composed:12:39 AM 08/12/2024

## 2024-09-08 ENCOUNTER — PATIENT MESSAGE (OUTPATIENT)
Dept: ADMINISTRATIVE | Facility: OTHER | Age: 69
End: 2024-09-08
Payer: MEDICARE

## 2024-09-08 DIAGNOSIS — E03.9 HYPOTHYROIDISM, UNSPECIFIED TYPE: ICD-10-CM

## 2024-09-08 RX ORDER — PREGABALIN 50 MG/1
50 CAPSULE ORAL 3 TIMES DAILY
Qty: 90 CAPSULE | Refills: 6 | Status: SHIPPED | OUTPATIENT
Start: 2024-09-08 | End: 2025-03-09

## 2024-09-08 NOTE — TELEPHONE ENCOUNTER
No care due was identified.  [facility] Embedded Care Due Messages. Reference number: [id number].   9/08/2024 11:29:39 AM CDT

## 2024-09-09 RX ORDER — LEVOTHYROXINE SODIUM 112 UG/1
112 TABLET ORAL DAILY
Qty: 90 TABLET | Refills: 3 | Status: SHIPPED | OUTPATIENT
Start: 2024-09-09

## 2024-09-17 ENCOUNTER — PATIENT OUTREACH (OUTPATIENT)
Dept: ADMINISTRATIVE | Facility: HOSPITAL | Age: 69
End: 2024-09-17
Payer: MEDICARE

## 2024-09-17 DIAGNOSIS — E78.5 HYPERLIPIDEMIA, UNSPECIFIED HYPERLIPIDEMIA TYPE: Primary | ICD-10-CM

## 2024-09-17 NOTE — PROGRESS NOTES
Health Maintenance Due   Topic Date Due    Diabetes Urine Screening  Never done    Foot Exam  Never done    Eye Exam  03/25/2020    Hemoglobin A1c  03/11/2024    Influenza Vaccine (1) 09/01/2024    COVID-19 Vaccine (7 - 2023-24 season) 09/01/2024    Lipid Panel  09/11/2024     Chart review completed. HM Updated. Triggered Links. Immunizations reviewed and updated. Care Everywhere Updated. Care Team Updated.  Outreached patient via portal. Placed lipid panel ordered. Scheduled lab appt.

## 2024-09-24 ENCOUNTER — PATIENT MESSAGE (OUTPATIENT)
Dept: ADMINISTRATIVE | Facility: OTHER | Age: 69
End: 2024-09-24
Payer: MEDICARE

## 2024-10-02 NOTE — TELEPHONE ENCOUNTER
Refill Routing Note   Medication(s) are not appropriate for processing by [facility] for the following reason(s):        Required labs outdated    [facility] action(s):  Defer             Appointments  past 12m or future 3m with PCP    Date Provider   Last Visit   5/16/2024 Deysi Zamorano MD   Next Visit   Visit date not found Deysi Zamorano MD   ED visits in past 90 days: 0        Note composed:1:55 PM 10/02/2024

## 2024-10-02 NOTE — TELEPHONE ENCOUNTER
No care due was identified.  Health [facility] Embedded Care Due Messages. Reference number: [id number].   10/02/2024 8:32:48 AM CDT

## 2024-10-03 RX ORDER — METFORMIN HYDROCHLORIDE 500 MG/1
500 TABLET ORAL
Qty: 90 TABLET | Refills: 0 | Status: SHIPPED | OUTPATIENT
Start: 2024-10-03

## 2024-10-07 ENCOUNTER — LAB VISIT (OUTPATIENT)
Dept: LAB | Facility: HOSPITAL | Age: 69
End: 2024-10-07
Attending: INTERNAL MEDICINE
Payer: MEDICARE

## 2024-10-07 DIAGNOSIS — E78.5 HYPERLIPIDEMIA, UNSPECIFIED HYPERLIPIDEMIA TYPE: ICD-10-CM

## 2024-10-07 DIAGNOSIS — E11.9 TYPE 2 DIABETES MELLITUS WITHOUT COMPLICATION, WITHOUT LONG-TERM CURRENT USE OF INSULIN: ICD-10-CM

## 2024-10-07 LAB
ALBUMIN SERPL BCP-MCNC: 3.8 G/DL (ref 3.5–5.2)
ALBUMIN/CREAT UR: 6.5 UG/MG (ref 0–30)
ALP SERPL-CCNC: 57 U/L (ref 55–135)
ALT SERPL W/O P-5'-P-CCNC: 21 U/L (ref 10–44)
ANION GAP SERPL CALC-SCNC: 6 MMOL/L (ref 8–16)
AST SERPL-CCNC: 20 U/L (ref 10–40)
BILIRUB SERPL-MCNC: 0.3 MG/DL (ref 0.1–1)
BUN SERPL-MCNC: 16 MG/DL (ref 8–23)
CALCIUM SERPL-MCNC: 9.5 MG/DL (ref 8.7–10.5)
CHLORIDE SERPL-SCNC: 106 MMOL/L (ref 95–110)
CHOLEST SERPL-MCNC: 213 MG/DL (ref 120–199)
CHOLEST/HDLC SERPL: 3.7 {RATIO} (ref 2–5)
CO2 SERPL-SCNC: 28 MMOL/L (ref 23–29)
CREAT SERPL-MCNC: 0.8 MG/DL (ref 0.5–1.4)
CREAT UR-MCNC: 93 MG/DL (ref 15–325)
EST. GFR  (NO RACE VARIABLE): >60 ML/MIN/1.73 M^2
ESTIMATED AVG GLUCOSE: 111 MG/DL (ref 68–131)
GLUCOSE SERPL-MCNC: 120 MG/DL (ref 70–110)
HBA1C MFR BLD: 5.5 % (ref 4–5.6)
HDLC SERPL-MCNC: 58 MG/DL (ref 40–75)
HDLC SERPL: 27.2 % (ref 20–50)
LDLC SERPL CALC-MCNC: 127.8 MG/DL (ref 63–159)
MICROALBUMIN UR DL<=1MG/L-MCNC: 6 UG/ML
NONHDLC SERPL-MCNC: 155 MG/DL
POTASSIUM SERPL-SCNC: 3.8 MMOL/L (ref 3.5–5.1)
PROT SERPL-MCNC: 7 G/DL (ref 6–8.4)
SODIUM SERPL-SCNC: 140 MMOL/L (ref 136–145)
TRIGL SERPL-MCNC: 136 MG/DL (ref 30–150)

## 2024-10-07 PROCEDURE — 80061 LIPID PANEL: CPT | Performed by: INTERNAL MEDICINE

## 2024-10-07 PROCEDURE — 36415 COLL VENOUS BLD VENIPUNCTURE: CPT | Performed by: INTERNAL MEDICINE

## 2024-10-07 PROCEDURE — 83036 HEMOGLOBIN GLYCOSYLATED A1C: CPT | Performed by: INTERNAL MEDICINE

## 2024-10-07 PROCEDURE — 82570 ASSAY OF URINE CREATININE: CPT | Performed by: INTERNAL MEDICINE

## 2024-10-07 PROCEDURE — 82043 UR ALBUMIN QUANTITATIVE: CPT | Performed by: INTERNAL MEDICINE

## 2024-10-07 PROCEDURE — 80053 COMPREHEN METABOLIC PANEL: CPT | Performed by: INTERNAL MEDICINE

## 2024-10-08 ENCOUNTER — TELEPHONE (OUTPATIENT)
Dept: PRIMARY CARE CLINIC | Facility: CLINIC | Age: 69
End: 2024-10-08
Payer: MEDICARE

## 2024-10-08 NOTE — TELEPHONE ENCOUNTER
October 8, 2024  Me to Ginger Marshall       10/8/24  4:35 PM  Neither of those show any significant changes compared to the last few years. Your total cholesterol tends to stay baseline just over 200. What we really look at the most is your LDL (bad cholesterol) and that was completely normal.      Thank you,   ALTA Hummel    This MyChart message has not been read.    Ginger Marshall to DAVI Jo Staff (supporting Deysi Zamorano MD)  10/8/24  4:20 PM  What about the high cholesterol and low anion gap?  Deysi Zamorano MD to Ginger Marshall 10/8/24  4:17 PM    Hi Ginger,  Your bloodwork looks fine and does not require any change in treatment.      Please contact me if you have any additional concerns.     Deysi Zamorano MD  10/8/2024 4:17 PM    Last read by Ginger Marshall at  4:20 PM on 10/8/2024.

## 2024-11-15 NOTE — PROGRESS NOTES
[facility] Note    Chief Complaint      Chief Complaint   Patient presents with    Hypertension       History of Present Illness      Ginger Marshall is a 68 y.o. female with chronic conditions of ddd, htn, hld, hypothyroidism who presents today for: here for blood pressure check. Getting high readings at home, 100- 149/60s-80s. Average 128/72. Hr 60-76. Has enrolled in digital medicine program, not seeing readings uploaded. In office readings similar to BP cuff. Does also report occasional palpitations, EKG  showed NSR with PACs, will repeat. Denies associated chest pain/sob/fatigue.  Does watch diet and exercise.     Sacroilitis: sees pain management.     Past Medical History:  Past Medical History:   Diagnosis Date    Basal cell carcinoma 2020    forehead    Osteopenia        Past Surgical History:   has a past surgical history that includes LASIK; Tonsillectomy;  section; Colonoscopy (N/A, 2022); injection, sacroiliac joint (Left, 10/13/2022); injection, sacroiliac joint (Left, 2023); Transforaminal epidural injection of steroid (Left, 2023); Adenoidectomy (?); and Eye surgery (LASIK/ RETINAL TEAR ).    Family History:  family history includes Arthritis in her sister and sister; Brain cancer in her mother; Breast cancer in some other family members; Cancer in her mother; Early death in her mother; Hyperlipidemia in her sister and sister; Stroke in her father, paternal aunt, and sister.     Social History:  Social History     Tobacco Use    Smoking status: Never     Passive exposure: Never    Smokeless tobacco: Never   Substance Use Topics    Alcohol use: Yes     Alcohol/week: 4.0 standard drinks of alcohol     Types: 2 Cans of beer, 2 Drinks containing 0.5 oz of alcohol per week     Comment: Social drinker    Drug use: Never       Review of Systems   Constitutional:  Negative for chills and fever.   Respiratory:  Negative for cough and shortness of breath.     Cardiovascular:  Positive for palpitations. Negative for chest pain.   Gastrointestinal:  Negative for constipation, diarrhea, nausea and vomiting.   Genitourinary:  Negative for dysuria and hematuria.   Musculoskeletal:  Negative for falls.   Neurological:  Negative for headaches.        Medications:  Outpatient Encounter Medications as of 3/7/2024   Medication Sig Dispense Refill    amLODIPine (NORVASC) 5 MG tablet Take 1 tablet (5 mg total) by mouth once daily. 90 tablet 3    atorvastatin (LIPITOR) 40 MG tablet Take 1 tablet (40 mg total) by mouth once daily. 90 tablet 3    doxycycline (MONODOX) 100 MG capsule Take 1 capsule (100 mg total) by mouth 2 (two) times daily. Take with food and water. Remain upright x 1 hr after taking. 20 capsule 0    gentamicin (GARAMYCIN) 0.1 % ointment Apply topically 2 (two) times daily. To excision sites on back 30 g 1    levothyroxine (SYNTHROID) 112 MCG tablet Take 1 tablet (112 mcg total) by mouth once daily. 90 tablet 3    metFORMIN (GLUCOPHAGE) 500 MG tablet Take 1 tablet (500 mg total) by mouth daily with breakfast. 90 tablet 3    multivitamin capsule Take 1 capsule by mouth once daily.      scopolamine (TRANSDERM-SCOP) 1.3-1.5 mg (1 mg over 3 days) Place 1 patch onto the skin every 72 hours. 5 patch 1    valACYclovir (VALTREX) 500 MG tablet Take 1 tablet (500 mg total) by mouth once daily. 30 tablet 5     No facility-administered encounter medications on file as of 3/7/2024.       Allergies:  Review of patient's allergies indicates:  No Known Allergies    Health Maintenance:  Immunization History   Administered Date(s) Administered    COVID-19, MRNA, LN-S, PF (Pfizer) (Gray Cap) 04/14/2022    COVID-19, MRNA, LN-S, PF (Pfizer) (Purple Cap) 02/22/2021, 03/15/2021, 11/19/2021    COVID-19, mRNA, LNP-S, PF, neeta-sucrose, 30 mcg/0.3 mL (Pfizer 2023 Ages 12+) 10/12/2023    COVID-19, mRNA, LNP-S, bivalent booster, PF (PFIZER OMICRON) 10/11/2022    Hepatitis A, Adult 03/25/2013  "   Hepatitis B, Adult 04/12/2016    Influenza 01/23/2014, 11/20/2021    Influenza - Intradermal - Quadrivalent - PF 01/23/2014    Influenza - Quadrivalent 01/23/2017, 10/01/2019    Influenza - Quadrivalent - High Dose - PF (65 years and older) 10/26/2022, 10/12/2023    Influenza - Quadrivalent - PF *Preferred* (6 months and older) 01/08/2011, 11/05/2017, 10/09/2018, 10/02/2020, 10/12/2021    Pneumococcal Conjugate - 20 Valent 08/23/2022    Poliovirus 03/25/2013    Rsv, Bivalent, Rsvpref (Abrysvo) 10/16/2023    Tdap 04/12/2016, 10/26/2022    Typhoid - ViCPs 03/25/2013, 03/23/2016    Yellow Fever 03/25/2013    Zoster Recombinant 06/19/2018, 12/12/2018      Health Maintenance   Topic Date Due    Mammogram  06/08/2024    DEXA Scan  08/24/2025    Lipid Panel  09/11/2028    Colorectal Cancer Screening  01/24/2029    TETANUS VACCINE  10/26/2032    Hepatitis C Screening  Completed    Shingles Vaccine  Completed        Physical Exam      Vital Signs  Pulse: 85  SpO2: 96 %  BP: 126/78  BP Location: Left arm  Patient Position: Sitting  Height and Weight  Height: 5' 6" (167.6 cm)  Weight: 91.9 kg (202 lb 9.6 oz)  BSA (Calculated - sq m): 2.07 sq meters  BMI (Calculated): 32.7  Weight in (lb) to have BMI = 25: 154.6]    Physical Exam  Constitutional:       Appearance: She is well-developed.   HENT:      Head: Normocephalic and atraumatic.   Cardiovascular:      Rate and Rhythm: Normal rate and regular rhythm.      Heart sounds: Normal heart sounds. No murmur heard.  Pulmonary:      Effort: Pulmonary effort is normal. No respiratory distress.      Breath sounds: Normal breath sounds.   Abdominal:      Palpations: Abdomen is soft.   Skin:     General: Skin is warm and dry.   Neurological:      Mental Status: She is alert. Mental status is at baseline.   Psychiatric:         Behavior: Behavior normal.          Laboratory:  CBC:  Recent Labs   Lab 08/24/22  0930 09/11/23  0816   WBC 7.42 5.75   RBC 4.43 4.66   Hemoglobin 13.7 14.0 "   Hematocrit 41.2 41.9   Platelets 244 243   MCV 93 90   MCH 30.9 30.0   MCHC 33.3 33.4     CMP:  Recent Labs   Lab 08/24/22  0930 09/11/23  0816   Glucose 100 97   Calcium 9.3 9.1   Albumin 4.1 4.3   Total Protein 7.3 7.2   Sodium 139 141   Potassium 4.2 4.2   CO2 26 25   Chloride 106 106   BUN 13 14   Alkaline Phosphatase 64 57   ALT 19 21   AST 17 23   Total Bilirubin 0.4 0.4     URINALYSIS:  Recent Labs   Lab 12/14/21  1011   Color, UA Yellow   Specific Gravity, UA 1.020   pH, UA 5.0   Protein, UA Negative   Bacteria Few A   Nitrite, UA Negative   Leukocytes, UA 3+ A      LIPIDS:  Recent Labs   Lab 08/24/22  0930 09/11/23  0816   TSH 1.427 3.164   HDL 68 59   Cholesterol 201 H 208 H   Triglycerides 91 118   LDL Cholesterol 114.8 125.4   HDL/Cholesterol Ratio 33.8 28.4   Non-HDL Cholesterol 133 149   Total Cholesterol/HDL Ratio 3.0 3.5     TSH:  Recent Labs   Lab 08/24/22  0930 09/11/23  0816   TSH 1.427 3.164     A1C:  Recent Labs   Lab 08/24/22  0930 09/11/23  0816   Hemoglobin A1C 5.6 5.8 H       Assessment/Plan     Ginger Marshall is a 68 y.o.female with:    1. Essential hypertension  Bp at goal. Continue current meds.    Enrolled in digital medicine  Did discuss lifestyle and diet.    2. Palpitations  - Holter monitor - 48 hour; Future  - IN OFFICE EKG 12-LEAD (to Muse)- NSR    3. Sacroilitis  Stable. Continue follow up with specialists.        Chronic conditions status updated as per HPI.  Other than changes above, cont current medications and maintain follow up with specialists.  No follow-ups on file.    No future appointments.    Adreinne Cotaya, FNP [facility]                   in acute distress.     Appearance: Normal appearance. She is well-developed.   HENT:      Head: Normocephalic.   Eyes:      Conjunctiva/sclera: Conjunctivae normal.      Pupils: Pupils are equal, round, and reactive to light.   Neck:      Thyroid: No thyromegaly.      Vascular: No carotid bruit or JVD.      Trachea: No tracheal deviation.   Cardiovascular:      Rate and Rhythm: Normal rate and regular rhythm.      Heart sounds: Normal heart sounds. No murmur heard.  Pulmonary:      Effort: Pulmonary effort is normal. No respiratory distress.      Breath sounds: Normal breath sounds. No wheezing or rhonchi.   Musculoskeletal:         General: Normal range of motion.      Cervical back: Normal range of motion and neck supple.   Lymphadenopathy:      Cervical: No cervical adenopathy.   Skin:     General: Skin is warm and dry.      Findings: No rash.   Neurological:      Mental Status: She is alert.   Psychiatric:         Mood and Affect: Mood normal.         Behavior: Behavior normal.         Thought Content: Thought content normal.         ASSESSMENT/PLAN:  1. Chronic bilateral low back pain without sciatica  -Overall stable.  Continue diclofenac, gabapentin at current doses.  -Discussed increasing gabapentin if not improving.  Current dose of 300 mg bid is effective.  UDS, controlled Rx contract, [facility].  Will update next visit.  - diclofenac (VOLTAREN) 75 MG EC tablet; Take 1 tablet by mouth 2 times daily as needed for Pain  Dispense: 180 tablet; Refill: 3    2. Degeneration of intervertebral disc of lumbar region with discogenic back pain      3. Chronic allergic rhinitis  -Stable, continue Xyzal, Astelin  - levocetirizine (XYZAL) 5 MG tablet; Take 1 tablet by mouth nightly  Dispense: 90 tablet; Refill: 3  - azelastine (ASTELIN) 0.1 % nasal spray; 2 sprays by Nasal route 2 times daily Use in each nostril as directed  Dispense: 3 each; Refill: 3    4. Familial hypercholesterolemia  -Stable, improved, continue

## 2024-11-16 DIAGNOSIS — G89.28 OTHER CHRONIC POSTPROCEDURAL PAIN: Primary | ICD-10-CM

## 2024-11-17 RX ORDER — PREGABALIN 50 MG/1
CAPSULE ORAL
Qty: 90 CAPSULE | Refills: 4 | Status: SHIPPED | OUTPATIENT
Start: 2024-11-17

## 2024-12-16 ENCOUNTER — PATIENT MESSAGE (OUTPATIENT)
Dept: ADMINISTRATIVE | Facility: HOSPITAL | Age: 69
End: 2024-12-16
Payer: MEDICARE

## 2024-12-24 ENCOUNTER — PATIENT MESSAGE (OUTPATIENT)
Dept: PRIMARY CARE CLINIC | Facility: CLINIC | Age: 69
End: 2024-12-24
Payer: MEDICARE

## 2025-01-03 RX ORDER — METFORMIN HYDROCHLORIDE 500 MG/1
500 TABLET ORAL
Qty: 90 TABLET | Refills: 0 | Status: SHIPPED | OUTPATIENT
Start: 2025-01-03

## 2025-01-03 NOTE — TELEPHONE ENCOUNTER
Care Due:                  Date            Visit Type   Department     Provider  --------------------------------------------------------------------------------                                EP -                              PRIMARY      [facility]  Last Visit: 05-      CARE ([facility])   TONY Zamorano  Next Visit: None Scheduled  None         None Found                                                            Last  Test          Frequency    Reason                     Performed    Due Date  --------------------------------------------------------------------------------    TSH.........  12 months..  levothyroxine............  09- 09-    [facility] Embedded Care Due Messages. Reference number: [id number].   1/03/2025 7:43:04 AM CST

## 2025-01-04 NOTE — TELEPHONE ENCOUNTER
Provider Staff:  Action required for this patient    Requires labs      Please see care gap opportunities below in Care Due Message.    Thanks!  [facility]     Appointments      Date Provider   Last Visit   5/16/2024 Deysi Zamorano MD   Next Visit   Visit date not found Deysi Zamorano MD     Refill Decision Note   Ginger Marshall  is requesting a refill authorization.  Brief Assessment and Rationale for Refill:  Approve     Medication Therapy Plan:         Comments:     Note composed:8:07 PM 01/03/2025

## 2025-01-06 ENCOUNTER — PATIENT MESSAGE (OUTPATIENT)
Dept: ADMINISTRATIVE | Facility: OTHER | Age: 70
End: 2025-01-06
Payer: MEDICARE

## 2025-01-09 ENCOUNTER — PATIENT MESSAGE (OUTPATIENT)
Dept: PRIMARY CARE CLINIC | Facility: CLINIC | Age: 70
End: 2025-01-09

## 2025-01-09 ENCOUNTER — OFFICE VISIT (OUTPATIENT)
Dept: PRIMARY CARE CLINIC | Facility: CLINIC | Age: 70
End: 2025-01-09
Payer: MEDICARE

## 2025-01-09 VITALS
SYSTOLIC BLOOD PRESSURE: 130 MMHG | WEIGHT: 213.31 LBS | DIASTOLIC BLOOD PRESSURE: 78 MMHG | BODY MASS INDEX: 34.43 KG/M2 | HEART RATE: 65 BPM | OXYGEN SATURATION: 97 %

## 2025-01-09 DIAGNOSIS — E11.9 TYPE 2 DIABETES MELLITUS WITHOUT COMPLICATION, WITHOUT LONG-TERM CURRENT USE OF INSULIN: ICD-10-CM

## 2025-01-09 DIAGNOSIS — M54.16 LUMBAR RADICULOPATHY: ICD-10-CM

## 2025-01-09 DIAGNOSIS — G47.33 OSA (OBSTRUCTIVE SLEEP APNEA): ICD-10-CM

## 2025-01-09 DIAGNOSIS — G89.28 OTHER CHRONIC POSTPROCEDURAL PAIN: ICD-10-CM

## 2025-01-09 DIAGNOSIS — E66.2 OBESITY WITH ALVEOLAR HYPOVENTILATION AND BODY MASS INDEX (BMI) OF 30 TO LESS THAN 35: ICD-10-CM

## 2025-01-09 DIAGNOSIS — E78.2 MIXED HYPERLIPIDEMIA: ICD-10-CM

## 2025-01-09 DIAGNOSIS — M85.88 OSTEOPENIA OF LUMBAR SPINE: ICD-10-CM

## 2025-01-09 DIAGNOSIS — M46.1 SACROILIITIS: ICD-10-CM

## 2025-01-09 DIAGNOSIS — M72.2 PLANTAR FASCIITIS OF LEFT FOOT: ICD-10-CM

## 2025-01-09 DIAGNOSIS — I10 ESSENTIAL HYPERTENSION: ICD-10-CM

## 2025-01-09 DIAGNOSIS — E03.9 ACQUIRED HYPOTHYROIDISM: ICD-10-CM

## 2025-01-09 DIAGNOSIS — Z76.89 ENCOUNTER TO ESTABLISH CARE: Primary | ICD-10-CM

## 2025-01-09 PROCEDURE — 99999 PR PBB SHADOW E&M-EST. PATIENT-LVL III: CPT | Mod: PBBFAC,,,

## 2025-01-09 RX ORDER — MELOXICAM 15 MG/1
15 TABLET ORAL DAILY
Qty: 14 TABLET | Refills: 0 | Status: SHIPPED | OUTPATIENT
Start: 2025-01-09 | End: 2025-01-09

## 2025-01-09 RX ORDER — MELOXICAM 15 MG/1
15 TABLET ORAL DAILY
Qty: 14 TABLET | Refills: 0 | Status: SHIPPED | OUTPATIENT
Start: 2025-01-09

## 2025-01-09 RX ORDER — PREGABALIN 75 MG/1
75 CAPSULE ORAL 3 TIMES DAILY
Qty: 90 CAPSULE | Refills: 11 | Status: SHIPPED | OUTPATIENT
Start: 2025-01-09 | End: 2026-01-04

## 2025-01-09 RX ORDER — TIRZEPATIDE 2.5 MG/.5ML
2.5 INJECTION, SOLUTION SUBCUTANEOUS
Qty: 2 ML | Refills: 1 | Status: SHIPPED | OUTPATIENT
Start: 2025-01-09 | End: 2025-01-10

## 2025-01-10 ENCOUNTER — TELEPHONE (OUTPATIENT)
Dept: PRIMARY CARE CLINIC | Facility: CLINIC | Age: 70
End: 2025-01-10
Payer: MEDICARE

## 2025-01-10 ENCOUNTER — PATIENT MESSAGE (OUTPATIENT)
Dept: PRIMARY CARE CLINIC | Facility: CLINIC | Age: 70
End: 2025-01-10
Payer: MEDICARE

## 2025-01-10 DIAGNOSIS — E11.9 TYPE 2 DIABETES MELLITUS WITHOUT COMPLICATION, WITHOUT LONG-TERM CURRENT USE OF INSULIN: ICD-10-CM

## 2025-01-10 DIAGNOSIS — E66.2 OBESITY WITH ALVEOLAR HYPOVENTILATION AND BODY MASS INDEX (BMI) OF 30 TO LESS THAN 35: ICD-10-CM

## 2025-01-10 DIAGNOSIS — G47.33 OSA (OBSTRUCTIVE SLEEP APNEA): ICD-10-CM

## 2025-01-10 DIAGNOSIS — E11.9 TYPE 2 DIABETES MELLITUS WITHOUT COMPLICATION, WITHOUT LONG-TERM CURRENT USE OF INSULIN: Primary | ICD-10-CM

## 2025-01-10 PROBLEM — M72.2 PLANTAR FASCIITIS OF LEFT FOOT: Status: ACTIVE | Noted: 2025-01-10

## 2025-01-10 RX ORDER — TIRZEPATIDE 2.5 MG/.5ML
2.5 INJECTION, SOLUTION SUBCUTANEOUS
Qty: 2 ML | Refills: 1 | Status: SHIPPED | OUTPATIENT
Start: 2025-01-10

## 2025-01-10 RX ORDER — TIRZEPATIDE 2.5 MG/.5ML
2.5 INJECTION, SOLUTION SUBCUTANEOUS
Qty: 2 ML | Refills: 1 | Status: SHIPPED | OUTPATIENT
Start: 2025-01-10 | End: 2025-01-10 | Stop reason: SDUPTHER

## 2025-01-10 RX ORDER — TIRZEPATIDE 2.5 MG/.5ML
2.5 INJECTION, SOLUTION SUBCUTANEOUS
Qty: 2 ML | Refills: 1 | Status: SHIPPED | OUTPATIENT
Start: 2025-01-10 | End: 2025-01-10

## 2025-01-10 NOTE — ASSESSMENT & PLAN NOTE
- Recommend daily supplement with calcium (calcium carbonate: 1000mg daily) and vitamin D3: 25mcg daily or 1000units daily). This can be taken individually or as a combination therapy (ex Citracal).  - Continue aerobic exercise as tolerated, goal 150min weekly  - Future DEXA scan in 2-5yrs years.

## 2025-01-10 NOTE — ASSESSMENT & PLAN NOTE
Wt Readings from Last 8 Encounters:   01/09/25 96.7 kg (213 lb 4.7 oz)   06/04/24 94 kg (207 lb 3.7 oz)   05/16/24 94.2 kg (207 lb 10.8 oz)   04/24/24 88.9 kg (195 lb 15.8 oz)   03/13/24 88.9 kg (196 lb)   03/07/24 91.9 kg (202 lb 9.6 oz)   02/22/24 88.2 kg (194 lb 7.1 oz)   10/13/23 92.2 kg (203 lb 4.2 oz)   - Risk and Benefits of GLP-1 and DPP-4 injector medications discussed  - Ordered Zepbound to manage obesity, DM2, and NORBERT  - Discussed possible future referral to nutrition  - Continue low fat/sugar diet  - Recommend food diary  - Aerobic exercise as tolerated, goal 150min/week.

## 2025-01-10 NOTE — PROGRESS NOTES
[facility]      Subjective     Patient Name: Ginger Marshall  YOB: 1955  Patient Age: 69 y.o.  Patient Sex: female  Patient Phone: [phone]  PCP: Timbo Rodriguez MD  Last PCP Appointment: 2025    History of Present Illness    CHIEF COMPLAINT:  Ms. Marshall presents today by herself to establish care with chief complaint of chronic back pain related symptoms.    BACK PAIN:  She reports chronic lower back and buttocks pain with radiation in a ribbon-like pattern to the left heel, persisting for over 10 years. Pain is particularly notable when getting up from a reclined position. She has L4, L5 stenosis and currently takes Lyrica 50mg 3 times daily with some benefit. Previous treatments including injections provided only 1-2 weeks of relief, and physical therapy has been attempted. She expresses interest in pursuing ablation as an alternative to surgery. Despite chronic pain, she maintains ability to walk up to five miles.    HEEL PAIN:  She experiences heel pain when getting up in the morning or after reclining, lasting about five minutes. She has high arches and uses cushioned inserts for support. She takes acetaminophen for pain management. The heel pain has been present for about a year.    MEDICAL HISTORY:  She has type 2 diabetes with A1C of 5.5 three months ago. She has hypothyroidism with good thyroid function and hyperlipidemia with cholesterol of 213.    SURGICAL HISTORY:  History includes , console removal, and Lasik surgery for detached retina.    FAMILY HISTORY:  Mother had brain cancer. Family history also includes strokes, high cholesterol, and arthritis.    MEDICATIONS:  Current medications include Amlodipine 5mg, Atorvastatin 40mg, Synthroid 112mcg, Metformin 500mg daily, and Lyrica 50mg 3 times daily.    SOCIAL HISTORY:  She is  with one adult child. She drinks socially, consuming approximately two drinks per week.    WEIGHT MANAGEMENT:  She has an elevated  BMI of 34 and expresses interest in weight loss medications, specifically ZepBound and Contrave.      ROS:  ROS is negative unless otherwise indicated in HPI.         Health Maintenance and Care Gaps  Covid is not UTD.  RSV is UTD.  Influenza is not UTD.  Shingles  is UTD.     Pneumovax is UTD.   TDap is UTD.   Colonoscopy is UTD.   DEXA  is UTD.  Mammogram  is UTD.  Hepatitis C is UTD in pts born between 0729-5878.   HIV is UTD     4Ms for Medical Decision-Making in Older Adults    Last Completed EAWV: None    MOBILITY:  Get Up and Go:       No data to display              Activities of Daily Living:       No data to display              Whisper Test:       No data to display              Disability Status:       No data to display              Nutrition Screening:       No data to display             Screening Score: 0-7 Malnourished, 8-11 At Risk, 12-14 Normal  Fall Risk:      1/9/2025     1:40 PM 6/4/2024    11:00 AM 4/24/2024     2:30 PM   Fall Risk Assessment - Outpatient   Mobility Status Ambulatory Ambulatory Ambulatory   Number of falls 0 0 0   Identified as fall risk False False False           MENTATION:   Depression Patient Health Questionnaire:      1/9/2025     1:51 PM   Depression Patient Health Questionnaire   Over the last two weeks how often have you been bothered by little interest or pleasure in doing things Not at all   Over the last two weeks how often have you been bothered by feeling down, depressed or hopeless Not at all   PHQ-2 Total Score 0     Has Dementia Dx: No  Has Anxiety Dx: No    Cognitive Function Screening:       No data to display              Cognitive Function Screening Total - Less than 4 = Abnormal,  Greater than or equal to 4 = Normal        MEDICATIONS:  High Risk Medications:  Total Active Medications: 1  pregabalin - 75 MG    WHAT MATTERS MOST:  Advance Care Planning   ACP Status:   Patient does not have an ACP conversation on file  Living Will: No  Power of :  No  LaPOST: No    What is most important right now is to focus on remaining as independent as possible and symptom/pain control    Accordingly, we have decided that the best plan to meet the patient's goals includes continuing with treatment      What matters most to patient today is: establishing care and managing back pain                 Social History     Socioeconomic History    Marital status:     Number of children: 1   Tobacco Use    Smoking status: Never     Passive exposure: Never    Smokeless tobacco: Never   Substance and Sexual Activity    Alcohol use: Yes     Alcohol/week: 4.0 standard drinks of alcohol     Types: 2 Cans of beer, 2 Drinks containing 0.5 oz of alcohol per week     Comment: Social drinker    Drug use: Never    Sexual activity: Yes     Partners: Male     Birth control/protection: Post-menopausal, None     Comment: Post menopause     Social Drivers of Health     Financial Resource Strain: Low Risk  (1/16/2024)    Overall Financial Resource Strain (CARDIA)     Difficulty of Paying Living Expenses: Not hard at all   Food Insecurity: No Food Insecurity (1/16/2024)    Hunger Vital Sign     Worried About Running Out of Food in the Last Year: Never true     Ran Out of Food in the Last Year: Never true   Transportation Needs: No Transportation Needs (1/16/2024)    PRAPARE - Transportation     Lack of Transportation (Medical): No     Lack of Transportation (Non-Medical): No   Physical Activity: Insufficiently Active (1/16/2024)    Exercise Vital Sign     Days of Exercise per Week: 1 day     Minutes of Exercise per Session: 30 min   Stress: No Stress Concern Present (1/16/2024)    [employer] - Occupational Stress Questionnaire     Feeling of Stress : Not at all   Housing Stability: Low Risk  (1/16/2024)    Housing Stability Vital Sign     Unable to Pay for Housing in the Last Year: No     Number of Places Lived in the Last Year: 1     Unstable Housing in the  Last Year: No       Past Medical History:   Diagnosis Date    Basal cell carcinoma 08/20/2020    forehead    NORBERT (obstructive sleep apnea)     Osteopenia        Prior to Admission medications    Medication Sig Start Date End Date Taking? Authorizing Provider   amLODIPine (NORVASC) 5 MG tablet TAKE 1 TABLET(5 MG) BY MOUTH EVERY DAY 8/12/24  Yes Deysi Zamorano MD   atorvastatin (LIPITOR) 40 MG tablet TAKE 1 TABLET(40 MG) BY MOUTH EVERY DAY 8/12/24  Yes Deysi Zamorano MD   levothyroxine (SYNTHROID) 112 MCG tablet Take 1 tablet (112 mcg total) by mouth once daily. 9/9/24  Yes Deysi Zamorano MD   metFORMIN (GLUCOPHAGE) 500 MG tablet TAKE 1 TABLET(500 MG) BY MOUTH DAILY WITH BREAKFAST 1/3/25  Yes Deysi Zamorano MD   multivitamin capsule Take 1 capsule by mouth once daily.   Yes Provider, Historical   valACYclovir (VALTREX) 500 MG tablet TAKE 1 TABLET(500 MG) BY MOUTH EVERY DAY 7/23/24  Yes Katty Vickers MD   meloxicam (MOBIC) 15 MG tablet Take 1 tablet (15 mg total) by mouth once daily. 1/9/25   Timbo Rodriguez MD   pregabalin (LYRICA) 75 MG capsule Take 1 capsule (75 mg total) by mouth 3 (three) times daily. TAKE 1 CAPSULE(75 MG) BY MOUTH THREE TIMES DAILY 1/9/25 1/4/26  Timbo Rodriguez MD   tirzepatide, weight loss, (ZEPBOUND) 2.5 mg/0.5 mL PnIj Inject 2.5 mg into the skin every 7 days. 1/9/25   Timbo Rodriguez MD       OBJECTIVE:     Vitals:    01/09/25 1347   BP: 130/78   BP Location: Left arm   Patient Position: Sitting   Pulse: 65   SpO2: 97%   Weight: 96.7 kg (213 lb 4.7 oz)       Body mass index is 34.43 kg/m².     PHYSICAL EXAM  GEN - A+OX4, NAD, obese   HEENT - PERRL, EOMI, OP clear  Neck - No thyromegaly or cervical LAD. No thyroid masses felt.  CV - RRR, no m/r   Chest - CTAB, no wheezing or rhonchi  Abd - S/NT/ND/+BS.   Ext - 2+BDP and radial pulses. No C/C/E.  MSK - normal ROM, no tenderness  Neuro - 5/5 BUE and BLE strength. Diabetic foot exam completed with no deficits, left heel tenderness to  deep palpation   LN - No axillary or inguinal LAD appreciated.  Skin - No rash.     LABS  Lab Results   Component Value Date    WBC 5.75 09/11/2023    HGB 14.0 09/11/2023    HCT 41.9 09/11/2023    MCV 90 09/11/2023     09/11/2023         CMP  Sodium   Date Value Ref Range Status   10/07/2024 140 136 - 145 mmol/L Final     Potassium   Date Value Ref Range Status   10/07/2024 3.8 3.5 - 5.1 mmol/L Final     Chloride   Date Value Ref Range Status   10/07/2024 106 95 - 110 mmol/L Final     CO2   Date Value Ref Range Status   10/07/2024 28 23 - 29 mmol/L Final     Glucose   Date Value Ref Range Status   10/07/2024 120 (H) 70 - 110 mg/dL Final     BUN   Date Value Ref Range Status   10/07/2024 16 8 - 23 mg/dL Final     Creatinine   Date Value Ref Range Status   10/07/2024 0.8 0.5 - 1.4 mg/dL Final     Calcium   Date Value Ref Range Status   10/07/2024 9.5 8.7 - 10.5 mg/dL Final     Total Protein   Date Value Ref Range Status   10/07/2024 7.0 6.0 - 8.4 g/dL Final     Albumin   Date Value Ref Range Status   10/07/2024 3.8 3.5 - 5.2 g/dL Final     Total Bilirubin   Date Value Ref Range Status   10/07/2024 0.3 0.1 - 1.0 mg/dL Final     Comment:     For infants and newborns, interpretation of results should be based  on gestational age, weight and in agreement with clinical  observations.    Premature Infant recommended reference ranges:  Up to 24 hours.............<8.0 mg/dL  Up to 48 hours............<12.0 mg/dL  3-5 days..................<15.0 mg/dL  6-29 days.................<15.0 mg/dL       Alkaline Phosphatase   Date Value Ref Range Status   10/07/2024 57 55 - 135 U/L Final     AST   Date Value Ref Range Status   10/07/2024 20 10 - 40 U/L Final     ALT   Date Value Ref Range Status   10/07/2024 21 10 - 44 U/L Final     Anion Gap   Date Value Ref Range Status   10/07/2024 6 (L) 8 - 16 mmol/L Final     eGFR   Date Value Ref Range Status   10/07/2024 >60 >60 mL/min/1.73 m^2 Final       ASSESSMENT & PLAN:   Ms. Ginger MCCARTNEY  Rolando is a 69 y.o. female who was seen in clinic today to establish care. Assessed chronic back pain related to L4-L5 severe stenosis; will inquire about ablation as next step before surgery. Evaluated heel pain; likely neuropathic in nature, possibly related to plantar fasciitis. Reviewed diabetes, NORBERT, and weight management options; ZepBound would be a great therapy to manage all three problems. Assessed prediabetes/type 2 diabetes management; A1C at 5.5. Evaluated lipid management; high-intensity statin therapy appropriate with current cholesterol at 213. Routine fasting labs ordered with RTC in 1 month        1. Encounter to establish care    2. Sacroiliitis  Overview:  - Managed with gabapentin and meloxicam  - Followed by PMR    Assessment & Plan:  - Continue management regimen and follow up with PMR  - See lumbar radiculopathy for more detail      3. Lumbar radiculopathy  Overview:  - MRI on 3/01/24 s/f spinal canal and neural foraminal narrowing most prominent at L4-L5 with severe canal stenosis  - Managed with meloxicam 15gm daily and lyrica 75mg TID    Assessment & Plan:  - Continue current therapy  - Follow up with PMR as scheduled    Orders:  -     pregabalin (LYRICA) 75 MG capsule; Take 1 capsule (75 mg total) by mouth 3 (three) times daily. TAKE 1 CAPSULE(75 MG) BY MOUTH THREE TIMES DAILY  Dispense: 90 capsule; Refill: 11    4. Osteopenia of lumbar spine  Overview:  - DEXA from 8/23/22 s/f osteopenia:  Lumbar spine (L1-L4):              BMD is 0.875 g/cm2, T-score is -1.6, and Z-score is 0.2.  Total hip:                                BMD is 0.849 g/cm2, T-score is -0.8, and Z-score is 0.5.  Femoral neck:                          BMD is 0.773 g/cm2, T-score is -0.7, and Z-score is 0.8.  Distal 1/3 radius:                      Not applicable  7.2% risk of a major osteoporotic fracture in the next 10 years.  0.4% risk of hip fracture in the next 10 years.    Assessment & Plan:  - Recommend daily  supplement with calcium (calcium carbonate: 1000mg daily) and vitamin D3: 25mcg daily or 1000units daily). This can be taken individually or as a combination therapy (ex Citracal).  - Continue aerobic exercise as tolerated, goal 150min weekly  - Future DEXA scan in 2-5yrs years.        5. Type 2 diabetes mellitus without complication, without long-term current use of insulin  Overview:  - Managed with metformin 500mg qd    Assessment & Plan:  Lab Results   Component Value Date    HGBA1C 5.5 10/07/2024    HGBA1C 5.8 (H) 09/11/2023    HGBA1C 5.6 08/24/2022    HGBA1C 5.7 (H) 01/07/2021    HGBA1C 5.7 (H) 09/02/2020   - A1C trending down  - Continue current regimen  - Ordered Zepbound to manage obesity, DM2, and NORBERT  - Low sugar/carb diet encouraged  - Aerobic exercise as tolerated, goal 150min/week  - Regular home blood glucose checks  - Complete annual eye & foot examinations.        Orders:  -     Hemoglobin A1C; Future; Expected date: 01/09/2025  -     tirzepatide, weight loss, (ZEPBOUND) 2.5 mg/0.5 mL PnIj; Inject 2.5 mg into the skin every 7 days.  Dispense: 2 mL; Refill: 1    6. Mixed hyperlipidemia  Overview:  - Managed with atorvastatin 40mg qd    Assessment & Plan:  Lab Results   Component Value Date    CHOL 213 (H) 10/07/2024    HDL 58 10/07/2024    LDLCALC 127.8 10/07/2024    TRIG 136 10/07/2024   - Cholesterol mildly elevated  - Continue statin therapy and low cholesterol diet  - Aerobic exercise as tolerated, goal 150min/week  - Trend future lipid panel.    Orders:  -     Lipid Panel; Future; Expected date: 01/09/2025    7. Acquired hypothyroidism  -     TSH; Future; Expected date: 01/09/2025    8. 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  Overview:  - Obesity managed with diet and exercise    Assessment & Plan:  Wt Readings from Last 8 Encounters:   01/09/25 96.7 kg (213 lb 4.7 oz)   06/04/24 94 kg (207 lb 3.7 oz)   05/16/24 94.2 kg (207 lb 10.8 oz)   04/24/24  88.9 kg (195 lb 15.8 oz)   03/13/24 88.9 kg (196 lb)   03/07/24 91.9 kg (202 lb 9.6 oz)   02/22/24 88.2 kg (194 lb 7.1 oz)   10/13/23 92.2 kg (203 lb 4.2 oz)   - Risk and Benefits of GLP-1 and DPP-4 injector medications discussed  - Ordered Zepbound to manage obesity, DM2, and NORBERT  - Discussed possible future referral to nutrition  - Continue low fat/sugar diet  - Recommend food diary  - Aerobic exercise as tolerated, goal 150min/week.      Orders:  -     CBC Auto Differential; Future; Expected date: 01/09/2025  -     Comprehensive Metabolic Panel; Future; Expected date: 01/09/2025  -     TSH; Future; Expected date: 01/09/2025  -     tirzepatide, weight loss, (ZEPBOUND) 2.5 mg/0.5 mL PnIj; Inject 2.5 mg into the skin every 7 days.  Dispense: 2 mL; Refill: 1    9. NORBERT (obstructive sleep apnea)  Overview:  - Managed with CPAP  - Likely associated with body habitus  - Discussed BMI trends and management strategies    Assessment & Plan:  - No reported issues with device, continue nightly use  - Future in-clinic [facility]  referral  - Ordered Zepbound to manage obesity, DM2, and NORBERT    Orders:  -     tirzepatide, weight loss, (ZEPBOUND) 2.5 mg/0.5 mL PnIj; Inject 2.5 mg into the skin every 7 days.  Dispense: 2 mL; Refill: 1    10. Other chronic postprocedural pain  -     pregabalin (LYRICA) 75 MG capsule; Take 1 capsule (75 mg total) by mouth 3 (three) times daily. TAKE 1 CAPSULE(75 MG) BY MOUTH THREE TIMES DAILY  Dispense: 90 capsule; Refill: 11    11. Plantar fasciitis of left foot  Overview:  - Acute on chronic left heel pain  - suspicious for plantar fascitis     Assessment & Plan:  - Manage with meloxicam 15mg qd    Orders:  -     Discontinue: meloxicam (MOBIC) 15 MG tablet; Take 1 tablet (15 mg total) by mouth once daily.  Dispense: 14 tablet; Refill: 0  -     meloxicam (MOBIC) 15 MG tablet; Take 1 tablet (15 mg total) by mouth once daily.  Dispense: 14 tablet; Refill: 0    12. Essential hypertension  Overview:  -  Managed with amlodipine 5mg qd    Assessment & Plan:  BP Readings from Last 5 Encounters:   01/09/25 130/78   06/04/24 118/78   05/16/24 126/70   04/24/24 118/74   03/13/24 137/73   - BP well controlled  - Continue current BP regimen  - Daily home BP checks/logs  - Cardiac diet encouraged  - Aerobic exercise as tolerated, goal 150min/week.           Follow up in about 4 weeks (around 2/6/2025).     All questions answered. Pt to call/message the [facility] for any additional concerns    I spent a total of 51 minutes on the day of the visit.        Timbo Rodriguez MD  [facility] - [facility]    This note was generated with the assistance of ambient listening technology. Verbal consent was obtained by the patient and accompanying visitor(s) for the recording of patient appointment to facilitate this note. I attest to having reviewed and edited the generated note for accuracy, though some syntax or spelling errors may persist. Please contact the author of this note for any clarification.

## 2025-01-10 NOTE — TELEPHONE ENCOUNTER
Returned pt's call and gave her the info that Yina left on her notes. Pt has further questions rewarding the med and copays, Would like Yina to call her back. She prefers the Rx Mounjaro to be send to [facility] instead.    Please assist pt.

## 2025-01-10 NOTE — ASSESSMENT & PLAN NOTE
Lab Results   Component Value Date    CHOL 213 (H) 10/07/2024    HDL 58 10/07/2024    LDLCALC 127.8 10/07/2024    TRIG 136 10/07/2024   - Cholesterol mildly elevated  - Continue statin therapy and low cholesterol diet  - Aerobic exercise as tolerated, goal 150min/week  - Trend future lipid panel.

## 2025-01-10 NOTE — ASSESSMENT & PLAN NOTE
BP Readings from Last 5 Encounters:   01/09/25 130/78   06/04/24 118/78   05/16/24 126/70   04/24/24 118/74   03/13/24 137/73   - BP well controlled  - Continue current BP regimen  - Daily home BP checks/logs  - Cardiac diet encouraged  - Aerobic exercise as tolerated, goal 150min/week.

## 2025-01-10 NOTE — TELEPHONE ENCOUNTER
----- Message from Lakeshia sent at 1/10/2025 10:16 AM CST -----  Contact: [phone]  Patient is returning a phone call.  Who left a message for the patient: Yina  Does patient know what this is regarding:    Would you like a call back, or a response through your [facility] portal?:   call back   Comments:      Pt missed call

## 2025-01-10 NOTE — ASSESSMENT & PLAN NOTE
- No reported issues with device, continue nightly use  - Future in-clinic [facility]  referral  - Ordered Zepbound to manage obesity, DM2, and NORBERT

## 2025-01-10 NOTE — TELEPHONE ENCOUNTER
Outgoing call regarding Mounjaro rx, but no answer; lvm. Calling to let patient know that the medication seems to be approved and will have a $25 copay. Seems to be currently out-of-stock at [facility]. Pt will have to call them ([phone]) to get an accurate timeline for dispense.

## 2025-01-10 NOTE — ASSESSMENT & PLAN NOTE
Lab Results   Component Value Date    HGBA1C 5.5 10/07/2024    HGBA1C 5.8 (H) 09/11/2023    HGBA1C 5.6 08/24/2022    HGBA1C 5.7 (H) 01/07/2021    HGBA1C 5.7 (H) 09/02/2020   - A1C trending down  - Continue current regimen  - Ordered Zepbound to manage obesity, DM2, and NORBERT  - Low sugar/carb diet encouraged  - Aerobic exercise as tolerated, goal 150min/week  - Regular home blood glucose checks  - Complete annual eye & foot examinations.

## 2025-01-13 ENCOUNTER — TELEPHONE (OUTPATIENT)
Dept: PAIN MEDICINE | Facility: CLINIC | Age: 70
End: 2025-01-13
Payer: MEDICARE

## 2025-01-14 ENCOUNTER — TELEPHONE (OUTPATIENT)
Dept: PAIN MEDICINE | Facility: CLINIC | Age: 70
End: 2025-01-14
Payer: MEDICARE

## 2025-01-14 ENCOUNTER — OFFICE VISIT (OUTPATIENT)
Dept: PAIN MEDICINE | Facility: CLINIC | Age: 70
End: 2025-01-14
Payer: MEDICARE

## 2025-01-14 VITALS
BODY MASS INDEX: 34.33 KG/M2 | DIASTOLIC BLOOD PRESSURE: 80 MMHG | HEIGHT: 66 IN | WEIGHT: 213.63 LBS | SYSTOLIC BLOOD PRESSURE: 122 MMHG | HEART RATE: 88 BPM

## 2025-01-14 DIAGNOSIS — M51.361 DEGENERATION OF INTERVERTEBRAL DISC OF LUMBAR REGION WITH LOWER EXTREMITY PAIN: ICD-10-CM

## 2025-01-14 DIAGNOSIS — M47.819 ARTHROPATHY OF FACET JOINTS AT MULTIPLE LEVELS: ICD-10-CM

## 2025-01-14 DIAGNOSIS — M47.816 LUMBAR SPONDYLOSIS: Primary | ICD-10-CM

## 2025-01-14 DIAGNOSIS — G89.4 CHRONIC PAIN SYNDROME: ICD-10-CM

## 2025-01-14 DIAGNOSIS — M47.819 SPONDYLOSIS WITHOUT MYELOPATHY: Primary | ICD-10-CM

## 2025-01-14 PROCEDURE — 3074F SYST BP LT 130 MM HG: CPT | Mod: CPTII,S$GLB,, | Performed by: NURSE PRACTITIONER

## 2025-01-14 PROCEDURE — 99999 PR PBB SHADOW E&M-EST. PATIENT-LVL III: CPT | Mod: PBBFAC,,, | Performed by: NURSE PRACTITIONER

## 2025-01-14 PROCEDURE — 3079F DIAST BP 80-89 MM HG: CPT | Mod: CPTII,S$GLB,, | Performed by: NURSE PRACTITIONER

## 2025-01-14 PROCEDURE — 99214 OFFICE O/P EST MOD 30 MIN: CPT | Mod: S$GLB,,, | Performed by: NURSE PRACTITIONER

## 2025-01-14 PROCEDURE — 1125F AMNT PAIN NOTED PAIN PRSNT: CPT | Mod: CPTII,S$GLB,, | Performed by: NURSE PRACTITIONER

## 2025-01-14 PROCEDURE — G2211 COMPLEX E/M VISIT ADD ON: HCPCS | Mod: S$GLB,,, | Performed by: NURSE PRACTITIONER

## 2025-01-14 PROCEDURE — 1159F MED LIST DOCD IN RCRD: CPT | Mod: CPTII,S$GLB,, | Performed by: NURSE PRACTITIONER

## 2025-01-14 PROCEDURE — 1160F RVW MEDS BY RX/DR IN RCRD: CPT | Mod: CPTII,S$GLB,, | Performed by: NURSE PRACTITIONER

## 2025-01-14 PROCEDURE — 3008F BODY MASS INDEX DOCD: CPT | Mod: CPTII,S$GLB,, | Performed by: NURSE PRACTITIONER

## 2025-01-14 PROCEDURE — 3288F FALL RISK ASSESSMENT DOCD: CPT | Mod: CPTII,S$GLB,, | Performed by: NURSE PRACTITIONER

## 2025-01-14 PROCEDURE — 1101F PT FALLS ASSESS-DOCD LE1/YR: CPT | Mod: CPTII,S$GLB,, | Performed by: NURSE PRACTITIONER

## 2025-01-14 NOTE — H&P (VIEW-ONLY)
[facility] Established Clinic Visit    Chief Complaint:   Chief Complaint   Patient presents with    Low-back Pain    Foot Pain     Interval update 01/14/25:  68-year-old female that presents today complaining of left low back and left lateral leg pain.  She was recently seen by Neurosurgery and decompressive surgery was recommended however patient declined at the time.  She would like to consider other pain interventions in effort to help reduce some of her axial low back pain.  Neurosurgery did start her on a low dose Lyrica with the goal of increasing if tolerated she reports this has significantly reduce some of her radicular symptoms in the left leg.  She also was put on Mobic by her PCP for what appeared to be plantar fasciitis of her heel which she also states has helped with heel pain and low back pain.  She is here today to discuss more lower axial back pain that has been ongoing pain is exacerbate with extension, and lateral movements.  She denies any recent incident or trauma denies any profound weakness denies any bowel or bladder dysfunction at this time.    Interval History 2/22/2024:  68-year-old female that presents today complaining of left low back and left lateral leg pain.  She is established office and was previously provided a left L3-4 and L4-5 TF KIKI that provided her with 50% for 3-4 months and then pain slowly returned.  She has also been provide a left SI joint injection that lasted for 2 months.  She denies any recent incident or trauma denies any profound weakness she does report numbness tingling burning mainly numbness in her left leg.  Is exacerbated after walking for long periods of time and standing she has requesting a repeat left TF KIKI.      Interval History  (10/13/2023):   Ginger Marshall returns today for follow up for continued left low back and left lateral leg pain.  She is known to our office and has been previously provided a left L3-4 and L4-5 TF KIKI that provided  relief for 2-3 weeks she is also been provided a left SI joint injection that helped for 2 months.  Her pain is intermittent when she walks, stands feels tingling and burning in her left lateral leg now it is going past her knee previously was not.  It does not prevent her from performing ADLs working.  She reports that she will be leaving for vacation next week and would like to be considered for more injections and a plan of care moving forward.  She denies any profound weakness denies any recent incident or trauma denies any bowel bladder dysfunction,.  Currently she is taking Tylenol and BC powder both which alleviate her symptoms.    .    Current Pain Scales:  Current: 6/10              Typical Range: 6-7/10    Interval History(08/21/2023):   Ginger Marshall returns today for follow up.  She is s/p a Left L3/4 and L4/5 Lumbar Transforaminal Epidural Steroid Injection that provided her 30% relief. She continues to have pain in her left low back particular when active. She reports better relief following her previous Left SI joint. She denies any B/B dysfunction, denies any new pain, denies any profound weakness.     Current Pain Scales:  Current: 5/10              Typical Range: 5-6/10     History of Present Illness: Ginger Marshall is a 69 y.o. female referred by Dr. Rachel Camacho for low back and left lower extremity pain. Lateral left leg pain started about 12 years ago as numbness but about a year ago it started becoming more painful with pins and needles and a burning sensation with activity. She was seeing a chiropractor who tried manipulations and a spinal decompression device which did not help. Wakes her up at night. Somewhat better with rest.     Onset: 12 years  Location: low back left leg  Radiation: left leg, up to knee  Timing: constant  Quality: Aching, Dull, Burning, Tingling, Deep, Numb and Hot  Exacerbating Factors: exercise, lifting, lying down, standing for more than 10 minutes and walking for more  than 20 minutes  Alleviating Factors: rest and sitting  Associated Symptoms: denies urinary incontinence, bowel incontinence, significant weight loss and significant motor weakness    Severity: Currently: 3/10   Typical Range: 3-8/10     Exacerbation: 8/10     Interval History   01/18/[id number]-elzx-old female presents today with returning left low back pain she also has intermittent numbness in her lateral left leg that she described as burning worse when standing or walking for long periods of time.  She did report improvement following her previous left SI joint injection and left GTB  that was provided to her on 10/13/2022 per Dr. Camacho.  Is the most relief she has received she has been provided a left femoral cutaneous nerve block 50% relief but for a short period time.      (10/04/2022):  Ginger Marshall returns today for follow up.  At the last clinic visit she was provide a LFCN block 50% relief.  She reports continued pain in the lateral aspect of her left leg, she does describe burning that is worse when standing or walking for long periods of time, she did report mild improvement but continues to have numbing in that area.  She also complains of left low back pain.     Current Pain Scales:  Current: 2/10              Typical Range: 2-3/10       (09/07/2022):  Ginger Marshall returns today for follow up.  At the last clinic visit, referred to PT and scheduled LFCN block.    She is in PT and this is going well.     Currently, the back and lateral hip pain is stable.  She still has left leg numbness. She denies any significant changes since previous visit.       Current Pain Scales:  Current: 4/10              Typical Range: 3-8/10            Previous Interventions:  -07/26/2023 Left  L3/4 and L4/5 Lumbar Transforaminal Epidural Steroid Injection 50%   -10/13/2022 Left Sacroiliac Joint Injection and Left Greater Trochanter Bursa Injection   -09/08/2022 left femoral cutaneous nerve block 50% relief    Previous  Therapies:  PT/OT: yes   Relevant Surgery: no   Previous Medications:   - NSAIDS: Occasionally uses Ibuprofen, Tylenol which help a little  - Muscle Relaxants:    - TCAs:   - SNRIs:   - Topicals:   - Anticonvulsants:    - Opioids:     Current Pain Medications:  1-2x/week BC Powder or Ibuprofen PM     Blood Thinners: None    Full Medication List:    Current Outpatient Medications:     amLODIPine (NORVASC) 5 MG tablet, TAKE 1 TABLET(5 MG) BY MOUTH EVERY DAY, Disp: 90 tablet, Rfl: 3    atorvastatin (LIPITOR) 40 MG tablet, TAKE 1 TABLET(40 MG) BY MOUTH EVERY DAY, Disp: 90 tablet, Rfl: 0    levothyroxine (SYNTHROID) 112 MCG tablet, Take 1 tablet (112 mcg total) by mouth once daily., Disp: 90 tablet, Rfl: 3    meloxicam (MOBIC) 15 MG tablet, Take 1 tablet (15 mg total) by mouth once daily., Disp: 14 tablet, Rfl: 0    metFORMIN (GLUCOPHAGE) 500 MG tablet, TAKE 1 TABLET(500 MG) BY MOUTH DAILY WITH BREAKFAST, Disp: 90 tablet, Rfl: 0    multivitamin capsule, Take 1 capsule by mouth once daily., Disp: , Rfl:     [START ON 1/15/2025] phentermine-topiramate (QSYMIA) 3.75-23 mg CM24, Take 1 capsule by mouth once daily. for 14 days, Disp: 14 capsule, Rfl: 0    [START ON 1/30/2025] phentermine-topiramate (QSYMIA) 7.5-46 mg CM24, Take 1 capsule by mouth once daily., Disp: 84 capsule, Rfl: 0    pregabalin (LYRICA) 75 MG capsule, Take 1 capsule (75 mg total) by mouth 3 (three) times daily. TAKE 1 CAPSULE(75 MG) BY MOUTH THREE TIMES DAILY, Disp: 90 capsule, Rfl: 11    tirzepatide (MOUNJARO) 2.5 mg/0.5 mL PnIj, Inject 2.5 mg into the skin every 7 days., Disp: 2 mL, Rfl: 1    valACYclovir (VALTREX) 500 MG tablet, TAKE 1 TABLET(500 MG) BY MOUTH EVERY DAY, Disp: 30 tablet, Rfl: 5     Review of Systems:  Review of Systems   Musculoskeletal:  Positive for back pain.       Allergies:  Patient has no known allergies.     Medical History:   has a past medical history of Basal cell carcinoma (08/20/2020), NORBERT (obstructive sleep apnea), and  "Osteopenia.    Surgical History:   has a past surgical history that includes LASIK; Tonsillectomy;  section; Colonoscopy (N/A, 2022); injection, sacroiliac joint (Left, 10/13/2022); injection, sacroiliac joint (Left, 2023); Transforaminal epidural injection of steroid (Left, 2023); Adenoidectomy (?); Eye surgery (LASIK/ RETINAL TEAR ); and Epidural steroid injection into lumbar spine (Left, 3/13/2024).    Family History:  family history includes Arthritis in her sister and sister; Brain cancer in her mother; Breast cancer in some other family members; Cancer in her mother; Early death in her mother; Hyperlipidemia in her sister and sister; Stroke in her father, paternal aunt, and sister.    Social History:   reports that she has never smoked. She has never been exposed to tobacco smoke. She has never used smokeless tobacco. She reports current alcohol use of about 4.0 standard drinks of alcohol per week. She reports that she does not use drugs.    Physical Exam:  /80   Pulse 88   Ht 5' 6" (1.676 m)   Wt 96.9 kg (213 lb 10 oz)   LMP 2000   BMI 34.48 kg/m²   GEN: No acute distress. Calm, comfortable  HENT: Normocephalic, atraumatic, moist mucous membranes  EYE: Anicteric sclera, non-injected.   CV: Non-diaphoretic. Regular Rate. Radial Pulses 2+.  RESP: Breathing comfortably. Chest expansion symmetric.  EXT: No clubbing, cyanosis.   SKIN: Warm, & dry to palpation. No visible rashes or lesions of exposed skin.   PSYCH: Pleasant mood and appropriate affect. Recent and remote memory intact.   GAIT: Independent, nonantalgic ambulation    Lumbar Spine Exam:       Inspection: No erythema, bruising. No surgical incisions      Palpation: (+) TTP of sacroiliac joint on left.       ROM: Not Limited in flexion, extension, lateral bending      (+) Facet loading bilaterally      (-) Straight Leg Raise bilaterally      (+) TYLOR on left      (+) Dior'sTest on left  Hip Exam:      " "Inspection: No gross deformity or apparent leg length discrepancy      Palpation: +TTP of left GTB.       ROM: Full internal rotation, external rotation without pain.       (-) FADIR bilaterally but with "tightness" and "stretching" on the left  Neurologic Exam:     Alert. Speech is fluent and appropriate.     Strength: 5/5 throughout bilateral lower extremities     Sensation: Abnormal to LT in LFCN distribution on the left, otherwise grossly intact to light touch in bilateral lower extremities     Reflexes: 2+ in b/l patella, achilles     Tone: No abnormality appreciated in bilateral lower extremities     No Clonus     Downgoing toes on plantar stimulation     (-) Urias bilaterally    Imaging:  - Pt has had MRI L-spine done at outside facility but will need to bring disc.     Labs:  BMP  Lab Results   Component Value Date     10/07/2024    K 3.8 10/07/2024     10/07/2024    CO2 28 10/07/2024    BUN 16 10/07/2024    CREATININE 0.8 10/07/2024    CALCIUM 9.5 10/07/2024    ANIONGAP 6 (L) 10/07/2024    ESTGFRAFRICA >60.0 01/07/2021    EGFRNONAA >60.0 01/07/2021     Lab Results   Component Value Date    ALT 21 10/07/2024    AST 20 10/07/2024    ALKPHOS 57 10/07/2024    BILITOT 0.3 10/07/2024     Lab Results   Component Value Date     09/11/2023       Assessment:  Ginger Marshall is a 69 y.o. female with the following diagnoses based on history, exam, and imaging:    Problem List Items Addressed This Visit    None          This is a pleasant 69 y.o. lady presenting with:     - Left thigh numbness which appears consistent with Lateral Femoral Cutaneous Neuropathy/Meralgia Paresthetica: a snapshot of her L-spine MRI which shows a potential herniated disc with nerve impingement but symptoms fit less with this diagnosis and we will need to see all the images.   - Left low back pain: SIJ pain on exam  - Left lateral thigh/hip pain with GTBursitis and ITB syndrome  - Comorbidities: HTN, HLD, Osteopenia, NORBERT. " BMI >30.     10/04/2022 67-year-old female that was provided a left lateral femoral cutaneous nerve block last clinic visit she reports 50% relief however continues to experience pain in the left lateral leg described as numbness and burning that is worse when walking, standing performing ADLs.  She also complains of left low back pain, she did have SI joint pain again upon exam.  She also has pain in the left lateral hip that is likely related to GT bursitis.    01/18/20230614-37-gubd-old female with a history of left thigh numbness that did not get better following a left femoral cutaneous nerve block.  However she did get better from her left leg and left low back pain following an SI joint injection.  Based on history and exam today it does appear that she continues to have left SI joint pain, her last than left SI joint injection was in October 2022.  She states she had significant relief and would like to repeat this injection.  I discussed that it would be appropriate to repeat it at this time however I would not want to repeat this again in the proximally 6-7 month.    8/21/2023- Ginger Marshall is a 69 y.o. female who  has a past medical history of Basal cell carcinoma (08/20/2020), NORBERT (obstructive sleep apnea), and Osteopenia.  By history and examination this patient has chronic low back pain with radiculopathy.  The underlying cause cause is facet arthritis, degenerative disc disease, foraminal stenosis, and sacroiliitis.  Pathology is confirmed by imaging.  We discussed the underlying diagnoses and multiple treatment options including non-opioid medications, interventional procedures, physical therapy, home exercise, and weight loss.  The risks and benefits of each treatment option were discussed and all questions were answered.      10/13/2023-Ginger Marshall is a 69 y.o. female who  has a past medical history of Basal cell carcinoma (08/20/2020), NORBERT (obstructive sleep apnea), and Osteopenia.  By history and  examination this patient has chronic low back pain with LEFT lateral radiculopathy.  The underlying cause cause is facet arthritis, degenerative disc disease, foraminal stenosis, and sacroiliitis.  Pathology is confirmed by imaging.  We discussed the underlying diagnoses and multiple treatment options including non-opioid medications, interventional procedures, home exercise, core muscle enhancement, activity modification, and weight loss.  The risks and benefits of each treatment option were discussed and all questions were answered.      2022 lumbar MRI shows worst pathology at L3-4 which she has moderate diffuse disc bulge lateralizing toward the left with a faint left paracentral annular tear and minimal inferior extrusion.  There is moderate left and lateral recess and foraminal stenosis.  Descending left L4 nerve root and the existing L3 nerve root are about it.  She also has moderate left and mild right neural foraminal stenosis at L4-5 and at L5-S1 and annular tear right      02/22/2024-Ginger Marshall is a 69 y.o. female who  has a past medical history of Basal cell carcinoma (08/20/2020), NORBERT (obstructive sleep apnea), and Osteopenia.  By history and examination this patient has chronic low back pain with RIGHT  radiculopathy.  The underlying cause  is facet arthritis, degenerative disc disease, foraminal stenosis, and central canal stenosis.  Pathology is confirmed by imaging.  We discussed the underlying diagnoses and multiple treatment options including non-opioid medications, interventional procedures, physical therapy, home exercise, core muscle enhancement, and activity modification.  The risks and benefits of each treatment option were discussed and all questions were answered.    Her MRI is significant for disc bulges at L3-4 this lateralizes towards the left with a faint lateral paracentral annular tear and minimal inferior extrusion of disc material into the left paracentral region.  Additionally at   L4-5 disc bulge, facet arthropathy which results in moderate left and mild right neural foraminal narrowing.  At L5-S1 she has a mild diffuse disc bulge with right paracentral annular tear.  She also has bilateral facet arthropathy this level.    1/14/2025-Ginger Marshall is a 69 y.o. female who  has a past medical history of Basal cell carcinoma (08/20/2020), NORBERT (obstructive sleep apnea), and Osteopenia.  By history and examination this patient has chronic low back pain with radiculopathy.  The underlying cause cause is facet arthritis, degenerative disc disease, foraminal stenosis, central canal stenosis, and muscle dysfunction.  Pathology is confirmed by imaging.  We discussed the underlying diagnoses and multiple treatment options including non-opioid medications, interventional procedures, physical therapy, home exercise, core muscle enhancement, activity modification, and weight loss.  The risks and benefits of each treatment option were discussed and all questions were answered.          Treatment Plan:   - PT/OT/HEP:  Continue to work on SIJ dysfunction, GTB, and IT band tightness with home exercise plan.  Discussed benefits of exercise for pain.   - Procedures:  s/f diagnostic lumbar MBB targeting L3, L4 and L5 bilaterally to help with axial low back pain x2 and RFA if appropriate.                           Consider repeating left SI joint injection this gave her 2-3 months of relief  - Medications:  Continue Lyrica 25 mg increasing as tolerated per Neurosurgery                             Discuss she could continue utilizing Mobic for her PCP only as needed educated patient I do not want her to take this medication chronically.  Patient verbalized understanding.   - Imaging:  All previous imaging reviewed and discussed with the patient using spine model and images from chart.  - Labs: Reviewed. Medications are appropriately dosed for current hepatorenal function.      Follow Up:  1-2 days after 1st diagnostic  block per office for [facility] care      Adrian Portillo NP-RADHA  Interventional Pain Management    Disclaimer: This note was partly generated using dictation software which may occasionally result in transcription errors.

## 2025-01-14 NOTE — TELEPHONE ENCOUNTER
----- Message from ALMA Renae sent at 2025 10:49 AM CST -----  Regarding: Order for JAKE ALICEA    Patient Name: JAKE ALICEA([id number])  Sex: Female  : 1955      PCP: FIOR MCMAHAN    Center: [facility]     Level of Service:09466     [place] OFFICE/OUTPT VISIT, EST, LEVL IV, 30-39 MIN    Types of orders made on 2025: Procedure Request    Order Date:2025  Ordering User:TRENT DAWKINS [[id number]]  Encounter Provider:Trent Dawkins FNP [[id number]]  Authorizing Provider: Trent Dawkins FNP [[id number]]  Supervising Provider:DEEPA ARRIAZA [[id number]]  Type of Supervision:Collaborating Physician  Department:[facility]IN [facility]NA[facility][[id number]]    Common Order Information  Procedure -> Medial Branch Block (Specify level and laterality) Cmt: L3,4,5             Bilaterally    Pre-op Diagnosis -> Lumbar spondylosis       -> Lumbar facet arthropathy     Order Specific Information  Order: Procedure Request Order for Pain Management [Custom: [id number]]  Order #:          [id number]: 1 FUTURE    Priority: Routine  Class: Clinic Performed    Future Order Information      Expires on:2026            Expected by:2025                   Associated Diagnoses      M47.819 Arthropathy of facet joints at multiple levels      M47.819 Spondylosis without myelopathy      G89.4 Chronic pain syndrome      Physician -> Reji         Is patient on anti-coagulants? -> No         Facility Name: -> [place]         Follow-up: -> 2 weeks           Priority: Routine  Class: Clinic Performed    Future Order Information      Expires on:2026            Expected by:2025                   Associated Diagnoses      M47.819 Arthropathy of facet joints at multiple levels      M47.819 Spondylosis without myelopathy      G89.4 Chronic pain syndrome      Procedure -> Medial Branch Block (Specify level and laterality) Cmt: L3,4,5                 Bilaterally        Physician -> Gelter         Is patient on  anti-coagulants? -> No         Pre-op Diagnosis -> Lumbar spondylosis           -> Lumbar facet arthropathy         Facility Name: -> [place]         Follow-up: -> 2 weeks

## 2025-01-14 NOTE — PROGRESS NOTES
[facility] Established Clinic Visit    Chief Complaint:   Chief Complaint   Patient presents with    Low-back Pain    Foot Pain     Interval update 01/14/25:  68-year-old female that presents today complaining of left low back and left lateral leg pain.  She was recently seen by Neurosurgery and decompressive surgery was recommended however patient declined at the time.  She would like to consider other pain interventions in effort to help reduce some of her axial low back pain.  Neurosurgery did start her on a low dose Lyrica with the goal of increasing if tolerated she reports this has significantly reduce some of her radicular symptoms in the left leg.  She also was put on Mobic by her PCP for what appeared to be plantar fasciitis of her heel which she also states has helped with heel pain and low back pain.  She is here today to discuss more lower axial back pain that has been ongoing pain is exacerbate with extension, and lateral movements.  She denies any recent incident or trauma denies any profound weakness denies any bowel or bladder dysfunction at this time.    Interval History 2/22/2024:  68-year-old female that presents today complaining of left low back and left lateral leg pain.  She is established office and was previously provided a left L3-4 and L4-5 TF KIKI that provided her with 50% for 3-4 months and then pain slowly returned.  She has also been provide a left SI joint injection that lasted for 2 months.  She denies any recent incident or trauma denies any profound weakness she does report numbness tingling burning mainly numbness in her left leg.  Is exacerbated after walking for long periods of time and standing she has requesting a repeat left TF KIKI.      Interval History  (10/13/2023):   Ginger Marshall returns today for follow up for continued left low back and left lateral leg pain.  She is known to our office and has been previously provided a left L3-4 and L4-5 TF KIKI that provided  relief for 2-3 weeks she is also been provided a left SI joint injection that helped for 2 months.  Her pain is intermittent when she walks, stands feels tingling and burning in her left lateral leg now it is going past her knee previously was not.  It does not prevent her from performing ADLs working.  She reports that she will be leaving for vacation next week and would like to be considered for more injections and a plan of care moving forward.  She denies any profound weakness denies any recent incident or trauma denies any bowel bladder dysfunction,.  Currently she is taking Tylenol and BC powder both which alleviate her symptoms.    .    Current Pain Scales:  Current: 6/10              Typical Range: 6-7/10    Interval History(08/21/2023):   Ginger Marshall returns today for follow up.  She is s/p a Left L3/4 and L4/5 Lumbar Transforaminal Epidural Steroid Injection that provided her 30% relief. She continues to have pain in her left low back particular when active. She reports better relief following her previous Left SI joint. She denies any B/B dysfunction, denies any new pain, denies any profound weakness.     Current Pain Scales:  Current: 5/10              Typical Range: 5-6/10     History of Present Illness: Ginger Marshall is a 69 y.o. female referred by Dr. Rachel Camacho for low back and left lower extremity pain. Lateral left leg pain started about 12 years ago as numbness but about a year ago it started becoming more painful with pins and needles and a burning sensation with activity. She was seeing a chiropractor who tried manipulations and a spinal decompression device which did not help. Wakes her up at night. Somewhat better with rest.     Onset: 12 years  Location: low back left leg  Radiation: left leg, up to knee  Timing: constant  Quality: Aching, Dull, Burning, Tingling, Deep, Numb and Hot  Exacerbating Factors: exercise, lifting, lying down, standing for more than 10 minutes and walking for more  than 20 minutes  Alleviating Factors: rest and sitting  Associated Symptoms: denies urinary incontinence, bowel incontinence, significant weight loss and significant motor weakness    Severity: Currently: 3/10   Typical Range: 3-8/10     Exacerbation: 8/10     Interval History   01/18/[id number]-pojl-old female presents today with returning left low back pain she also has intermittent numbness in her lateral left leg that she described as burning worse when standing or walking for long periods of time.  She did report improvement following her previous left SI joint injection and left GTB  that was provided to her on 10/13/2022 per Dr. Camacho.  Is the most relief she has received she has been provided a left femoral cutaneous nerve block 50% relief but for a short period time.      (10/04/2022):  Ginger Marshall returns today for follow up.  At the last clinic visit she was provide a LFCN block 50% relief.  She reports continued pain in the lateral aspect of her left leg, she does describe burning that is worse when standing or walking for long periods of time, she did report mild improvement but continues to have numbing in that area.  She also complains of left low back pain.     Current Pain Scales:  Current: 2/10              Typical Range: 2-3/10       (09/07/2022):  Ginger Marshall returns today for follow up.  At the last clinic visit, referred to PT and scheduled LFCN block.    She is in PT and this is going well.     Currently, the back and lateral hip pain is stable.  She still has left leg numbness. She denies any significant changes since previous visit.       Current Pain Scales:  Current: 4/10              Typical Range: 3-8/10            Previous Interventions:  -07/26/2023 Left  L3/4 and L4/5 Lumbar Transforaminal Epidural Steroid Injection 50%   -10/13/2022 Left Sacroiliac Joint Injection and Left Greater Trochanter Bursa Injection   -09/08/2022 left femoral cutaneous nerve block 50% relief    Previous  Therapies:  PT/OT: yes   Relevant Surgery: no   Previous Medications:   - NSAIDS: Occasionally uses Ibuprofen, Tylenol which help a little  - Muscle Relaxants:    - TCAs:   - SNRIs:   - Topicals:   - Anticonvulsants:    - Opioids:     Current Pain Medications:  1-2x/week BC Powder or Ibuprofen PM     Blood Thinners: None    Full Medication List:    Current Outpatient Medications:     amLODIPine (NORVASC) 5 MG tablet, TAKE 1 TABLET(5 MG) BY MOUTH EVERY DAY, Disp: 90 tablet, Rfl: 3    atorvastatin (LIPITOR) 40 MG tablet, TAKE 1 TABLET(40 MG) BY MOUTH EVERY DAY, Disp: 90 tablet, Rfl: 0    levothyroxine (SYNTHROID) 112 MCG tablet, Take 1 tablet (112 mcg total) by mouth once daily., Disp: 90 tablet, Rfl: 3    meloxicam (MOBIC) 15 MG tablet, Take 1 tablet (15 mg total) by mouth once daily., Disp: 14 tablet, Rfl: 0    metFORMIN (GLUCOPHAGE) 500 MG tablet, TAKE 1 TABLET(500 MG) BY MOUTH DAILY WITH BREAKFAST, Disp: 90 tablet, Rfl: 0    multivitamin capsule, Take 1 capsule by mouth once daily., Disp: , Rfl:     [START ON 1/15/2025] phentermine-topiramate (QSYMIA) 3.75-23 mg CM24, Take 1 capsule by mouth once daily. for 14 days, Disp: 14 capsule, Rfl: 0    [START ON 1/30/2025] phentermine-topiramate (QSYMIA) 7.5-46 mg CM24, Take 1 capsule by mouth once daily., Disp: 84 capsule, Rfl: 0    pregabalin (LYRICA) 75 MG capsule, Take 1 capsule (75 mg total) by mouth 3 (three) times daily. TAKE 1 CAPSULE(75 MG) BY MOUTH THREE TIMES DAILY, Disp: 90 capsule, Rfl: 11    tirzepatide (MOUNJARO) 2.5 mg/0.5 mL PnIj, Inject 2.5 mg into the skin every 7 days., Disp: 2 mL, Rfl: 1    valACYclovir (VALTREX) 500 MG tablet, TAKE 1 TABLET(500 MG) BY MOUTH EVERY DAY, Disp: 30 tablet, Rfl: 5     Review of Systems:  Review of Systems   Musculoskeletal:  Positive for back pain.       Allergies:  Patient has no known allergies.     Medical History:   has a past medical history of Basal cell carcinoma (08/20/2020), NORBERT (obstructive sleep apnea), and  "Osteopenia.    Surgical History:   has a past surgical history that includes LASIK; Tonsillectomy;  section; Colonoscopy (N/A, 2022); injection, sacroiliac joint (Left, 10/13/2022); injection, sacroiliac joint (Left, 2023); Transforaminal epidural injection of steroid (Left, 2023); Adenoidectomy (?); Eye surgery (LASIK/ RETINAL TEAR ); and Epidural steroid injection into lumbar spine (Left, 3/13/2024).    Family History:  family history includes Arthritis in her sister and sister; Brain cancer in her mother; Breast cancer in some other family members; Cancer in her mother; Early death in her mother; Hyperlipidemia in her sister and sister; Stroke in her father, paternal aunt, and sister.    Social History:   reports that she has never smoked. She has never been exposed to tobacco smoke. She has never used smokeless tobacco. She reports current alcohol use of about 4.0 standard drinks of alcohol per week. She reports that she does not use drugs.    Physical Exam:  /80   Pulse 88   Ht 5' 6" (1.676 m)   Wt 96.9 kg (213 lb 10 oz)   LMP 2000   BMI 34.48 kg/m²   GEN: No acute distress. Calm, comfortable  HENT: Normocephalic, atraumatic, moist mucous membranes  EYE: Anicteric sclera, non-injected.   CV: Non-diaphoretic. Regular Rate. Radial Pulses 2+.  RESP: Breathing comfortably. Chest expansion symmetric.  EXT: No clubbing, cyanosis.   SKIN: Warm, & dry to palpation. No visible rashes or lesions of exposed skin.   PSYCH: Pleasant mood and appropriate affect. Recent and remote memory intact.   GAIT: Independent, nonantalgic ambulation    Lumbar Spine Exam:       Inspection: No erythema, bruising. No surgical incisions      Palpation: (+) TTP of sacroiliac joint on left.       ROM: Not Limited in flexion, extension, lateral bending      (+) Facet loading bilaterally      (-) Straight Leg Raise bilaterally      (+) TYLOR on left      (+) Dior'sTest on left  Hip Exam:      " "Inspection: No gross deformity or apparent leg length discrepancy      Palpation: +TTP of left GTB.       ROM: Full internal rotation, external rotation without pain.       (-) FADIR bilaterally but with "tightness" and "stretching" on the left  Neurologic Exam:     Alert. Speech is fluent and appropriate.     Strength: 5/5 throughout bilateral lower extremities     Sensation: Abnormal to LT in LFCN distribution on the left, otherwise grossly intact to light touch in bilateral lower extremities     Reflexes: 2+ in b/l patella, achilles     Tone: No abnormality appreciated in bilateral lower extremities     No Clonus     Downgoing toes on plantar stimulation     (-) Urias bilaterally    Imaging:  - Pt has had MRI L-spine done at outside facility but will need to bring disc.     Labs:  BMP  Lab Results   Component Value Date     10/07/2024    K 3.8 10/07/2024     10/07/2024    CO2 28 10/07/2024    BUN 16 10/07/2024    CREATININE 0.8 10/07/2024    CALCIUM 9.5 10/07/2024    ANIONGAP 6 (L) 10/07/2024    ESTGFRAFRICA >60.0 01/07/2021    EGFRNONAA >60.0 01/07/2021     Lab Results   Component Value Date    ALT 21 10/07/2024    AST 20 10/07/2024    ALKPHOS 57 10/07/2024    BILITOT 0.3 10/07/2024     Lab Results   Component Value Date     09/11/2023       Assessment:  Ginger Marshall is a 69 y.o. female with the following diagnoses based on history, exam, and imaging:    Problem List Items Addressed This Visit    None          This is a pleasant 69 y.o. lady presenting with:     - Left thigh numbness which appears consistent with Lateral Femoral Cutaneous Neuropathy/Meralgia Paresthetica: a snapshot of her L-spine MRI which shows a potential herniated disc with nerve impingement but symptoms fit less with this diagnosis and we will need to see all the images.   - Left low back pain: SIJ pain on exam  - Left lateral thigh/hip pain with GTBursitis and ITB syndrome  - Comorbidities: HTN, HLD, Osteopenia, NORBERT. " BMI >30.     10/04/2022 67-year-old female that was provided a left lateral femoral cutaneous nerve block last clinic visit she reports 50% relief however continues to experience pain in the left lateral leg described as numbness and burning that is worse when walking, standing performing ADLs.  She also complains of left low back pain, she did have SI joint pain again upon exam.  She also has pain in the left lateral hip that is likely related to GT bursitis.    01/18/[id number]-bpyl-old female with a history of left thigh numbness that did not get better following a left femoral cutaneous nerve block.  However she did get better from her left leg and left low back pain following an SI joint injection.  Based on history and exam today it does appear that she continues to have left SI joint pain, her last than left SI joint injection was in October 2022.  She states she had significant relief and would like to repeat this injection.  I discussed that it would be appropriate to repeat it at this time however I would not want to repeat this again in the proximally 6-7 month.    8/21/2023- Ginger Marshall is a 69 y.o. female who  has a past medical history of Basal cell carcinoma (08/20/2020), NORBERT (obstructive sleep apnea), and Osteopenia.  By history and examination this patient has chronic low back pain with radiculopathy.  The underlying cause cause is facet arthritis, degenerative disc disease, foraminal stenosis, and sacroiliitis.  Pathology is confirmed by imaging.  We discussed the underlying diagnoses and multiple treatment options including non-opioid medications, interventional procedures, physical therapy, home exercise, and weight loss.  The risks and benefits of each treatment option were discussed and all questions were answered.      10/13/2023-Ginger Marshall is a 69 y.o. female who  has a past medical history of Basal cell carcinoma (08/20/2020), NORBERT (obstructive sleep apnea), and Osteopenia.  By history and  examination this patient has chronic low back pain with LEFT lateral radiculopathy.  The underlying cause cause is facet arthritis, degenerative disc disease, foraminal stenosis, and sacroiliitis.  Pathology is confirmed by imaging.  We discussed the underlying diagnoses and multiple treatment options including non-opioid medications, interventional procedures, home exercise, core muscle enhancement, activity modification, and weight loss.  The risks and benefits of each treatment option were discussed and all questions were answered.      2022 lumbar MRI shows worst pathology at L3-4 which she has moderate diffuse disc bulge lateralizing toward the left with a faint left paracentral annular tear and minimal inferior extrusion.  There is moderate left and lateral recess and foraminal stenosis.  Descending left L4 nerve root and the existing L3 nerve root are about it.  She also has moderate left and mild right neural foraminal stenosis at L4-5 and at L5-S1 and annular tear right      02/22/2024-Ginger Marshall is a 69 y.o. female who  has a past medical history of Basal cell carcinoma (08/20/2020), NORBERT (obstructive sleep apnea), and Osteopenia.  By history and examination this patient has chronic low back pain with RIGHT  radiculopathy.  The underlying cause  is facet arthritis, degenerative disc disease, foraminal stenosis, and central canal stenosis.  Pathology is confirmed by imaging.  We discussed the underlying diagnoses and multiple treatment options including non-opioid medications, interventional procedures, physical therapy, home exercise, core muscle enhancement, and activity modification.  The risks and benefits of each treatment option were discussed and all questions were answered.    Her MRI is significant for disc bulges at L3-4 this lateralizes towards the left with a faint lateral paracentral annular tear and minimal inferior extrusion of disc material into the left paracentral region.  Additionally at   L4-5 disc bulge, facet arthropathy which results in moderate left and mild right neural foraminal narrowing.  At L5-S1 she has a mild diffuse disc bulge with right paracentral annular tear.  She also has bilateral facet arthropathy this level.    1/14/2025-Ginger Marshall is a 69 y.o. female who  has a past medical history of Basal cell carcinoma (08/20/2020), NORBERT (obstructive sleep apnea), and Osteopenia.  By history and examination this patient has chronic low back pain with radiculopathy.  The underlying cause cause is facet arthritis, degenerative disc disease, foraminal stenosis, central canal stenosis, and muscle dysfunction.  Pathology is confirmed by imaging.  We discussed the underlying diagnoses and multiple treatment options including non-opioid medications, interventional procedures, physical therapy, home exercise, core muscle enhancement, activity modification, and weight loss.  The risks and benefits of each treatment option were discussed and all questions were answered.          Treatment Plan:   - PT/OT/HEP:  Continue to work on SIJ dysfunction, GTB, and IT band tightness with home exercise plan.  Discussed benefits of exercise for pain.   - Procedures:  s/f diagnostic lumbar MBB targeting L3, L4 and L5 bilaterally to help with axial low back pain x2 and RFA if appropriate.                           Consider repeating left SI joint injection this gave her 2-3 months of relief  - Medications:  Continue Lyrica 25 mg increasing as tolerated per Neurosurgery                             Discuss she could continue utilizing Mobic for her PCP only as needed educated patient I do not want her to take this medication chronically.  Patient verbalized understanding.   - Imaging:  All previous imaging reviewed and discussed with the patient using spine model and images from chart.  - Labs: Reviewed. Medications are appropriately dosed for current hepatorenal function.      Follow Up:  1-2 days after 1st diagnostic  block per office for [facility] care      Adrian Portillo NP-RADHA  Interventional Pain Management    Disclaimer: This note was partly generated using dictation software which may occasionally result in transcription errors.

## 2025-01-15 NOTE — TELEPHONE ENCOUNTER
----- Message from ALMA Renae sent at 2025 10:49 AM CST -----  Regarding: Order for JAKE ALICEA    Patient Name: JAKE ALICEA([id number])  Sex: Female  : 1955      PCP: FIOR MCMAHAN    Center: [facility]     Level of Service:11925     [facility] OFFICE/OUTPT VISIT, EST, LEVL IV, 30-39 MIN    Types of orders made on 2025: Procedure Request    Order Date:2025  Ordering User:TRENT DAWKINS [[id number]]  Encounter Provider:Trent Dawkins FNP [[id number]]  Authorizing Provider: Trent Dawkins FNP [[id number]]  Supervising Provider:DEEPA ARRIAZA [[id number]]  Type of Supervision:Collaborating Physician  Department:[facility]IN [facility]NAGEMENT[[id number]]    Common Order Information  Procedure -> Medial Branch Block (Specify level and laterality) Cmt: L3,4,5             Bilaterally    Pre-op Diagnosis -> Lumbar spondylosis       -> Lumbar facet arthropathy     Order Specific Information  Order: Procedure Request Order for Pain Management [Custom: [id number]]  Order #:          [id number]: 1 FUTURE    Priority: Routine  Class: Clinic Performed    Future Order Information      Expires on:2026            Expected by:2025                   Associated Diagnoses      M47.819 Arthropathy of facet joints at multiple levels      M47.819 Spondylosis without myelopathy      G89.4 Chronic pain syndrome      Physician -> Reji         Is patient on anti-coagulants? -> No         Facility Name: -> [facility]         Follow-up: -> 2 weeks           Priority: Routine  Class: Clinic Performed    Future Order Information      Expires on:2026            Expected by:2025                   Associated Diagnoses      M47.819 Arthropathy of facet joints at multiple levels      M47.819 Spondylosis without myelopathy      G89.4 Chronic pain syndrome      Procedure -> Medial Branch Block (Specify level and laterality) Cmt: L3,4,5                 Bilaterally        Physician -> Gelter         Is patient on  anti-coagulants? -> No         Pre-op Diagnosis -> Lumbar spondylosis           -> Lumbar facet arthropathy         Facility Name: -> [facility]         Follow-up: -> 2 weeks

## 2025-01-26 DIAGNOSIS — E78.5 HYPERLIPIDEMIA, UNSPECIFIED HYPERLIPIDEMIA TYPE: ICD-10-CM

## 2025-01-27 RX ORDER — ATORVASTATIN CALCIUM 40 MG/1
40 TABLET, FILM COATED ORAL
Qty: 90 TABLET | Refills: 0 | Status: SHIPPED | OUTPATIENT
Start: 2025-01-27

## 2025-01-29 ENCOUNTER — PATIENT MESSAGE (OUTPATIENT)
Dept: PRIMARY CARE CLINIC | Facility: CLINIC | Age: 70
End: 2025-01-29
Payer: MEDICARE

## 2025-01-29 ENCOUNTER — TELEPHONE (OUTPATIENT)
Dept: PAIN MEDICINE | Facility: CLINIC | Age: 70
End: 2025-01-29
Payer: MEDICARE

## 2025-01-29 ENCOUNTER — LAB VISIT (OUTPATIENT)
Dept: LAB | Facility: HOSPITAL | Age: 70
End: 2025-01-29
Payer: MEDICARE

## 2025-01-29 DIAGNOSIS — E03.9 ACQUIRED HYPOTHYROIDISM: ICD-10-CM

## 2025-01-29 DIAGNOSIS — E78.2 MIXED HYPERLIPIDEMIA: ICD-10-CM

## 2025-01-29 DIAGNOSIS — E11.9 TYPE 2 DIABETES MELLITUS WITHOUT COMPLICATION, WITHOUT LONG-TERM CURRENT USE OF INSULIN: ICD-10-CM

## 2025-01-29 DIAGNOSIS — E66.2 OBESITY WITH ALVEOLAR HYPOVENTILATION AND BODY MASS INDEX (BMI) OF 30 TO LESS THAN 35: ICD-10-CM

## 2025-01-29 DIAGNOSIS — R11.0 NAUSEA: Primary | ICD-10-CM

## 2025-01-29 LAB
ALBUMIN SERPL BCP-MCNC: 4.1 G/DL (ref 3.5–5.2)
ALP SERPL-CCNC: 55 U/L (ref 40–150)
ALT SERPL W/O P-5'-P-CCNC: 16 U/L (ref 10–44)
ANION GAP SERPL CALC-SCNC: 10 MMOL/L (ref 8–16)
AST SERPL-CCNC: 18 U/L (ref 10–40)
BASOPHILS # BLD AUTO: 0.03 K/UL (ref 0–0.2)
BASOPHILS NFR BLD: 0.6 % (ref 0–1.9)
BILIRUB SERPL-MCNC: 0.5 MG/DL (ref 0.1–1)
BUN SERPL-MCNC: 17 MG/DL (ref 8–23)
CALCIUM SERPL-MCNC: 9.3 MG/DL (ref 8.7–10.5)
CHLORIDE SERPL-SCNC: 108 MMOL/L (ref 95–110)
CHOLEST SERPL-MCNC: 195 MG/DL (ref 120–199)
CHOLEST/HDLC SERPL: 3.1 {RATIO} (ref 2–5)
CO2 SERPL-SCNC: 24 MMOL/L (ref 23–29)
CREAT SERPL-MCNC: 0.9 MG/DL (ref 0.5–1.4)
DIFFERENTIAL METHOD BLD: NORMAL
EOSINOPHIL # BLD AUTO: 0.1 K/UL (ref 0–0.5)
EOSINOPHIL NFR BLD: 2.1 % (ref 0–8)
ERYTHROCYTE [DISTWIDTH] IN BLOOD BY AUTOMATED COUNT: 13.2 % (ref 11.5–14.5)
EST. GFR  (NO RACE VARIABLE): >60 ML/MIN/1.73 M^2
ESTIMATED AVG GLUCOSE: 114 MG/DL (ref 68–131)
GLUCOSE SERPL-MCNC: 96 MG/DL (ref 70–110)
HBA1C MFR BLD: 5.6 % (ref 4–5.6)
HCT VFR BLD AUTO: 42.6 % (ref 37–48.5)
HDLC SERPL-MCNC: 63 MG/DL (ref 40–75)
HDLC SERPL: 32.3 % (ref 20–50)
HGB BLD-MCNC: 14.2 G/DL (ref 12–16)
IMM GRANULOCYTES # BLD AUTO: 0.01 K/UL (ref 0–0.04)
IMM GRANULOCYTES NFR BLD AUTO: 0.2 % (ref 0–0.5)
LDLC SERPL CALC-MCNC: 113.6 MG/DL (ref 63–159)
LYMPHOCYTES # BLD AUTO: 2 K/UL (ref 1–4.8)
LYMPHOCYTES NFR BLD: 37 % (ref 18–48)
MCH RBC QN AUTO: 30.9 PG (ref 27–31)
MCHC RBC AUTO-ENTMCNC: 33.3 G/DL (ref 32–36)
MCV RBC AUTO: 93 FL (ref 82–98)
MONOCYTES # BLD AUTO: 0.6 K/UL (ref 0.3–1)
MONOCYTES NFR BLD: 12.1 % (ref 4–15)
NEUTROPHILS # BLD AUTO: 2.6 K/UL (ref 1.8–7.7)
NEUTROPHILS NFR BLD: 48 % (ref 38–73)
NONHDLC SERPL-MCNC: 132 MG/DL
NRBC BLD-RTO: 0 /100 WBC
PLATELET # BLD AUTO: 219 K/UL (ref 150–450)
PMV BLD AUTO: 11.6 FL (ref 9.2–12.9)
POTASSIUM SERPL-SCNC: 4.2 MMOL/L (ref 3.5–5.1)
PROT SERPL-MCNC: 7.4 G/DL (ref 6–8.4)
RBC # BLD AUTO: 4.6 M/UL (ref 4–5.4)
SODIUM SERPL-SCNC: 142 MMOL/L (ref 136–145)
TRIGL SERPL-MCNC: 92 MG/DL (ref 30–150)
TSH SERPL DL<=0.005 MIU/L-ACNC: 0.44 UIU/ML (ref 0.4–4)
WBC # BLD AUTO: 5.3 K/UL (ref 3.9–12.7)

## 2025-01-29 PROCEDURE — 83036 HEMOGLOBIN GLYCOSYLATED A1C: CPT

## 2025-01-29 PROCEDURE — 36415 COLL VENOUS BLD VENIPUNCTURE: CPT

## 2025-01-29 PROCEDURE — 80053 COMPREHEN METABOLIC PANEL: CPT

## 2025-01-29 PROCEDURE — 85025 COMPLETE CBC W/AUTO DIFF WBC: CPT

## 2025-01-29 PROCEDURE — 80061 LIPID PANEL: CPT

## 2025-01-29 PROCEDURE — 84443 ASSAY THYROID STIM HORMONE: CPT

## 2025-01-30 DIAGNOSIS — E11.9 TYPE 2 DIABETES MELLITUS WITHOUT COMPLICATION, WITHOUT LONG-TERM CURRENT USE OF INSULIN: ICD-10-CM

## 2025-01-30 DIAGNOSIS — G47.33 OSA (OBSTRUCTIVE SLEEP APNEA): ICD-10-CM

## 2025-01-30 DIAGNOSIS — E66.2 OBESITY WITH ALVEOLAR HYPOVENTILATION AND BODY MASS INDEX (BMI) OF 30 TO LESS THAN 35: ICD-10-CM

## 2025-01-30 RX ORDER — TIRZEPATIDE 5 MG/.5ML
5 INJECTION, SOLUTION SUBCUTANEOUS
Qty: 2 ML | Refills: 1 | Status: SHIPPED | OUTPATIENT
Start: 2025-01-30

## 2025-02-03 NOTE — PRE-PROCEDURE INSTRUCTIONS
Unable to reach pt via phone.  Left voicemail with arrival time also informing pt of need for responsible  accompaniment and instructing pt to follow pre-procedure instructions provided via [facility] portal.  The following message was sent to pt's portal.      Dear Ginger,     Please read over the following pre-procedure instructions in it's entirety as there is helpful information here to get you well prepared for your upcoming procedure.     You are scheduled for a procedure with Dr. Mendoza on 2/5/25.     [facility] is located at the corner of [facility] and [facility]. It is in the [place] next to [facility]. The address is: [address] [place] [address]. Take the elevator to the 2nd floor.      Registration check in time: 1:30 pm  Scheduled procedure time: 2:30 pm     You are scheduled to receive:____X___Oral sedation                                                                 _______IV Sedation                                                                 _______No Sedation                                                                                           If you are receiving any sedation, you CANNOT drive yourself and must have a responsible friend or family member (no rideshare) to drive you home.     You should take any medications that you routinely take for blood pressure, heart medications, thyroid, cholesterol, etc.      *The fasting restrictions are dependent on whether or not you are receiving sedation. Sedation is not available for all procedures.      Your fasting instructions for sedation patients are as follow:  IV sedation. Nothing to eat after midnight the night prior to procedure. Patients are encouraged to consume clear liquids up to 2 hours prior to scheduled arrival time. -Clear liquids include Gatorade, water, soda, black coffee or tea (no milk or creamer), and clear juices. - Clear liquids do NOT include anything with pulp or food particles  (chicken broth, ice cream, yogurt, Jello, etc.) You CANNOT drive yourself and must have a .            If you are on blood thinners, you need to follow the anticoagulation instructions that had been discussed previously. You should only stop the blood thinners if it was approved by your primary care physician or your cardiologist. In the event that you are not able to stop your blood thinners, a blood thinner was not listed on your medication list, or we were not able to get clearance from your cardiologist, then the procedure may have to be postponed/canceled.      IF you were told to stop your blood thinners, this is how long you should generally hold some of the more common ones. Remember that stopping blood thinners is only necessary for certain procedures. If you are unsure of your instructions, please call us.   Aspirin - 5 days  Plavix/Clopidogrel - 7 days  Warfarin / Coumadin - 5 days  Eliquis - 3 days  Pradaxa/Dabigatran - 4 days  Xarelto/Rivaroxaban - 3 days        HOLD all non-insulin injections (shots) until after surgery (Semaglutide, Tirzepatide, Ozempic, Mounjaro, Trulicity, Victoza, Byetta, Wegovy and Adlyxin) (Total of 7 days prior)        If you are a diabetic, do not take your medication if you will be fasting, but bring it with you. Please plan on being here for roughly 2-3 hours. Please note that most procedures will not be performed if you blood sugar is >200.     Please call us if you have been sick (running fever, having any flu-like symptoms) or have been taking ANTIBIOTICS in the past 2 weeks or had any outpatient procedures other than with us (colonoscopy, endoscopy, OBGYN, dental, etc.).      If you have been previously COVID positive, you will need to hold off on your procedure until you are symptom free for 10 days. If you did not have any symptoms, you can have your procedure 10 days from your positive test result.         On the morning of your procedure:  *HOLD ALL VITAMINS,  MINERALS, HERBS (INCLUDING HERBAL TEAS) AND SUPPLEMENTS  *SHOWER WITH ANTIBACTERIAL SOAP (EX. DIAL) NIGHT BEFORE AND MORNING OF PROCEDURE  *DO NOT APPLY ANY LOTIONS, OILS, POWDERS, PERFUME/COLOGNE, OINTMENTS, GELS, CREAMS, MAKEUP OR DEODORANT TO YOUR SKIN MORNING OF PROCEDURE  *LEAVE JEWELRY AND ANY VALUABLES AT HOME  *WEAR LOOSE COMFORTABLE CLOTHING       In the event that you are running late or need to reschedule on the day of your procedure, please contact the pre-op desk at [phone].       Please reply to this portal message as receipt of delivery.     Thank you,  [facility]  [place], LPN [facility]  Pre-Admit

## 2025-02-05 ENCOUNTER — HOSPITAL ENCOUNTER (OUTPATIENT)
Facility: HOSPITAL | Age: 70
Discharge: HOME | End: 2025-02-05
Attending: STUDENT IN AN ORGANIZED HEALTH CARE EDUCATION/TRAINING PROGRAM | Admitting: STUDENT IN AN ORGANIZED HEALTH CARE EDUCATION/TRAINING PROGRAM
Payer: MEDICARE

## 2025-02-05 VITALS
OXYGEN SATURATION: 94 % | WEIGHT: 200 LBS | DIASTOLIC BLOOD PRESSURE: 64 MMHG | HEART RATE: 63 BPM | RESPIRATION RATE: 16 BRPM | SYSTOLIC BLOOD PRESSURE: 126 MMHG | HEIGHT: 66 IN | BODY MASS INDEX: 32.14 KG/M2 | TEMPERATURE: 98 F

## 2025-02-05 DIAGNOSIS — M47.816 LUMBAR SPONDYLOSIS: Primary | ICD-10-CM

## 2025-02-05 DIAGNOSIS — G89.29 CHRONIC PAIN: ICD-10-CM

## 2025-02-05 LAB — POCT GLUCOSE: 89 MG/DL (ref 70–110)

## 2025-02-05 PROCEDURE — 63600175 PHARM REV CODE 636 W HCPCS: Performed by: STUDENT IN AN ORGANIZED HEALTH CARE EDUCATION/TRAINING PROGRAM

## 2025-02-05 PROCEDURE — 64493 INJ PARAVERT F JNT L/S 1 LEV: CPT | Mod: 50,,, | Performed by: STUDENT IN AN ORGANIZED HEALTH CARE EDUCATION/TRAINING PROGRAM

## 2025-02-05 PROCEDURE — 64493 INJ PARAVERT F JNT L/S 1 LEV: CPT | Mod: 50 | Performed by: STUDENT IN AN ORGANIZED HEALTH CARE EDUCATION/TRAINING PROGRAM

## 2025-02-05 PROCEDURE — 64494 INJ PARAVERT F JNT L/S 2 LEV: CPT | Mod: 50,,, | Performed by: STUDENT IN AN ORGANIZED HEALTH CARE EDUCATION/TRAINING PROGRAM

## 2025-02-05 PROCEDURE — 64494 INJ PARAVERT F JNT L/S 2 LEV: CPT | Mod: 50 | Performed by: STUDENT IN AN ORGANIZED HEALTH CARE EDUCATION/TRAINING PROGRAM

## 2025-02-05 PROCEDURE — 25000003 PHARM REV CODE 250: Performed by: STUDENT IN AN ORGANIZED HEALTH CARE EDUCATION/TRAINING PROGRAM

## 2025-02-05 PROCEDURE — 82962 GLUCOSE BLOOD TEST: CPT | Performed by: STUDENT IN AN ORGANIZED HEALTH CARE EDUCATION/TRAINING PROGRAM

## 2025-02-05 RX ORDER — ALPRAZOLAM 0.5 MG/1
0.5 TABLET, ORALLY DISINTEGRATING ORAL ONCE AS NEEDED
Status: COMPLETED | OUTPATIENT
Start: 2025-02-05 | End: 2025-02-05

## 2025-02-05 RX ORDER — LIDOCAINE HYDROCHLORIDE 20 MG/ML
INJECTION, SOLUTION EPIDURAL; INFILTRATION; INTRACAUDAL; PERINEURAL
Status: DISCONTINUED | OUTPATIENT
Start: 2025-02-05 | End: 2025-02-05 | Stop reason: HOSPADM

## 2025-02-05 RX ORDER — BUPIVACAINE HYDROCHLORIDE 2.5 MG/ML
INJECTION, SOLUTION EPIDURAL; INFILTRATION; INTRACAUDAL
Status: DISCONTINUED | OUTPATIENT
Start: 2025-02-05 | End: 2025-02-05 | Stop reason: HOSPADM

## 2025-02-05 RX ADMIN — ALPRAZOLAM 0.5 MG: 0.5 TABLET, ORALLY DISINTEGRATING ORAL at 01:02

## 2025-02-05 NOTE — OP NOTE
Diagnostic Lumbar Medial Branch Block Under Fluoroscopy    The procedure, risks, benefits, and options were discussed with the patient. There are no contraindications to the procedure. The patent expressed understanding and agreed to the procedure. Informed written consent was obtained prior to the start of the procedure and can be found in the patient's chart.    PATIENT NAME: Ginger Marshall   MRN: [id number]     DATE OF PROCEDURE: 02/05/2025                                           PROCEDURE:  Diagnostic Bilateral L3, L4, and L5 Lumbar Medial Branch Block under Fluoroscopy    PRE-OP DIAGNOSIS: Lumbar spondylosis [M47.816] Lumbar spondylosis [M47.816]    POST-OP DIAGNOSIS: Same    PHYSICIAN: Glenny Mendoza DO    ASSISTANTS: None    MEDICATIONS INJECTED:  Bupivicaine 0.25%    LOCAL ANESTHETIC INJECTED:   Xylocaine 2%    SEDATION: None    ESTIMATED BLOOD LOSS:  None    COMPLICATIONS:  None.    INTERVAL HISTORY: Patient has clinical and imaging findings suggestive of facet mediated pain.    TECHNIQUE: Time-out was performed to identify the patient and procedure to be performed. With the patient laying in a prone position, the surgical area was prepped and draped in the usual sterile fashion using ChloraPrep and fenestrated drape. The levels were determined under fluoroscopic guidance. Skin anesthesia was achieved by injecting Lidocaine 2% over the injection sites. A 22 gauge, 3.5 inch needle was introduced into the medial branch nerves at the junctions of the superior articular process and the transverse processes of the targeted sites using AP, lateral and/or contralateral oblique fluoroscopic imaging. After negative aspiration for blood or CSF was confirmed, 1 mL of the anesthetic listed above was then slowly injected at each site. The needles were removed and bleeding was nil. A sterile dressing was applied. No specimens collected. The patient tolerated the procedure well.    The patient was monitored after the  procedure in the recovery area. They were given post-procedure and discharge instructions to follow at home. The patient was discharged in a stable condition.    Glenny Mendoza DO

## 2025-02-05 NOTE — DISCHARGE INSTRUCTIONS
[facility]  Dr. Glenny Mendoza  Messaging service # [phone]    MEDIAL BRANCH BLOCK POST-PROCEDURE INSTRUCTIONS:    What you need to do:  Keep a record of your response to the injection you had today.  It is very important that you accurately record how you responded to today's injection.  Please try to separate any soreness from the needle from your usual pain.  We want to know how this affects your usual pain.  Also REMEMBER that today's injection is a DIAGNOSTIC block, the pain relief will only last for a few hours to a few days.  Insurance requires 2 of these blocks before progressing to the ablation.  The sole purpose of this injection is to give us information, and not to treat your pain for an extended period.    Think about the amount of pain that you would have doing the most painful activities (I.e. bending, twisting, walking, standing straight, cleaning, etc...).  Now do those same activities as soon as you get home from the hospital.  Think about how much better you feel doing those activities now that the injection is done.  Does it feel 50%, 80%, 100% better than it would have otherwise?  This is the number you need to send me.    Send me a message through [facility] or leave a message at the phone number above telling me what % of improvement you got from the injection.    If you have difficulty putting a percentage on your pain relief fill this out:  (Rate each question below from 0-10 with 0 being no pain and 10 being the worst imaginable pain)    How bad would your pain get during activities like walking/going up stairs BEFORE the injection? _______    For the first 8 hours AFTER the injection, when you did those same painful activities, what was you best pain score? ____________    Send me those two numbers if you aren't able to give a  percentage.  ---------------------------------------------------------------------------------------------------------------------------------------------------------------------------------------------  What to watch out for:    If you experience any of the following symptoms after your procedure, please notify the messaging service immediately (see above for contact information):   fever (increased oral temperature)   bleeding or swelling at the injection site,    drainage, rash or redness at the injection site    possible signs of infection    increased pain at the injection site   worsening of your usual pain   severe headache   new or worsening numbness    new arm and/or leg weakness, or    changes in bowel and/or bladder function: urinating or defecating on yourself and not knowing that you did it.    PLEASE FOLLOW ALL INSTRUCTIONS CAREFULLY     Do not engage in strenuous activity (e.g., lifting or pushing heavy objects or repeated bending) for 24 hours.     Do not take a bath, swim or use Jacuzzi for 24 hours after procedure. (A shower is fine).   Remove any Band-Aids when you get home.    Use cold/ice, as needed for comfort.  We recommend the use of cold therapy alternating on for 20 minutes, off for 20 minutes.    Do not apply direct heat (heating pad or heat packs) to the injection site for 24 hours.     Resume your usual medications, unless instructed otherwise by your Pain Physician.     If you are on warfarin (Coumadin) or other blood thinner, resume this medication as instructed by your prescribing Physician.    IF AT ANY POINT YOU ARE VERY CONCERNED ABOUT YOUR SYMPTOMS, PLEASE GO TO THE EMERGENCY ROOM.    If you develop worsening pain, weakness, numbness, lose bowel or bladder control (i.e., having an accident where you did not even know you had to go to the bathroom and suddenly noticed you soiled yourself), saddle anesthesia (a loss of sensation restricted to the area of the buttocks, anus and  between the legs -- i.e., those parts of your body that would touch a saddle if you were sitting on one) you need to go immediately to the emergency department for evaluation and treatment.    ----------------------------------------------------------------------------------------------------------------------------------------------------------------  If you received Sedation please read the following instructions:  POST SEDATION INSTRUCTIONS    Today you received intravenous medication (also known as sedation) that was used to help you relax and/or decrease discomfort during your procedure. This medication will be acting in your body for the next 24 hours, so you might feel a little tired or sleepy. This feeling will slowly wear off.   Common side effects associated with these medications include: drowsiness, dizziness, sleepiness, confusion, feeling excited, difficulty remembering things, lack of steadiness with walking or balance, loss of fine muscle control, slowed reflexes, difficulty focusing, and blurred vision.  Some over-the-counter and prescription medications (e.g., muscle relaxants, opioids, mood-altering medications, sedatives/hypnotics, antihistamines) can interact with the intravenous medication you received and cause an increased risk of the side effects listed above in addition to other potentially life threatening side effects. Use extreme caution if you are taking such medications, and consult with your Pain Physician or prescribing physician if you have any questions.  For the next 12-24 hours:    DO NOT--Drive a car, operate machinery or power tools   DO NOT--Drink any alcoholic beverages (not even beer), they may dangerously increase the risk of side effects.    DO NOT--Make any important legal or business decisions or sign important documents.  We advise you to have someone to assist you at home. Move slowly and carefully. Do not make sudden changes in position. Be aware of dizziness or  light-headedness and move accordingly.   If you seek medical treatment within 24 hours, let the nurse or doctor caring for you know that you have received the above medications. If you have any questions or concerns related to your sedation or treatment today please contact us.

## 2025-02-05 NOTE — DISCHARGE SUMMARY
Discharge Note  Short Stay      SUMMARY     Admit Date: 2/5/2025    Attending Physician: Glenny Mendoza      Discharge Physician: Glenny Mendoza      Discharge Date: 2/5/2025 2:27 PM    Procedure(s) (LRB):  L3,4,5 Bilaterally MBB #1 (Bilateral)    Final Diagnosis: Lumbar spondylosis [M47.816]    Disposition: Home or self care    Patient Instructions:   Current Discharge Medication List        CONTINUE these medications which have NOT CHANGED    Details   amLODIPine (NORVASC) 5 MG tablet TAKE 1 TABLET(5 MG) BY MOUTH EVERY DAY  Qty: 90 tablet, Refills: 3    Comments: .  Associated Diagnoses: Essential hypertension      atorvastatin (LIPITOR) 40 MG tablet TAKE 1 TABLET(40 MG) BY MOUTH EVERY DAY  Qty: 90 tablet, Refills: 0    Associated Diagnoses: Hyperlipidemia, unspecified hyperlipidemia type      levothyroxine (SYNTHROID) 112 MCG tablet Take 1 tablet (112 mcg total) by mouth once daily.  Qty: 90 tablet, Refills: 3    Associated Diagnoses: Hypothyroidism, unspecified type      metFORMIN (GLUCOPHAGE) 500 MG tablet TAKE 1 TABLET(500 MG) BY MOUTH DAILY WITH BREAKFAST  Qty: 90 tablet, Refills: 0      multivitamin capsule Take 1 capsule by mouth once daily.      pregabalin (LYRICA) 75 MG capsule Take 1 capsule (75 mg total) by mouth 3 (three) times daily. TAKE 1 CAPSULE(75 MG) BY MOUTH THREE TIMES DAILY  Qty: 90 capsule, Refills: 11    Associated Diagnoses: Lumbar radiculopathy; Other chronic postprocedural pain      tirzepatide (MOUNJARO) 2.5 mg/0.5 mL PnIj Inject 2.5 mg into the skin every 7 days.  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  Activity: as per instruction sheet.  Discharge to: Home with a responsible adult.  Follow up: 2-4 weeks       Please call my office or pager at [phone] if experienced any weakness or loss of sensation, fever > 101.5, pain uncontrolled with oral medications, persistent nausea/vomiting/or diarrhea, redness or drainage from the incisions, or any other worrisome concerns. If physician on call was not reached or could not communicate with our office for any reason please go to the nearest emergency department

## 2025-02-06 ENCOUNTER — OFFICE VISIT (OUTPATIENT)
Dept: PRIMARY CARE CLINIC | Facility: CLINIC | Age: 70
End: 2025-02-06
Payer: MEDICARE

## 2025-02-06 ENCOUNTER — PATIENT MESSAGE (OUTPATIENT)
Dept: PAIN MEDICINE | Facility: CLINIC | Age: 70
End: 2025-02-06
Payer: MEDICARE

## 2025-02-06 ENCOUNTER — TELEPHONE (OUTPATIENT)
Dept: PAIN MEDICINE | Facility: CLINIC | Age: 70
End: 2025-02-06
Payer: MEDICARE

## 2025-02-06 ENCOUNTER — PATIENT MESSAGE (OUTPATIENT)
Dept: PRIMARY CARE CLINIC | Facility: CLINIC | Age: 70
End: 2025-02-06
Payer: MEDICARE

## 2025-02-06 VITALS
SYSTOLIC BLOOD PRESSURE: 124 MMHG | BODY MASS INDEX: 33.75 KG/M2 | HEART RATE: 71 BPM | DIASTOLIC BLOOD PRESSURE: 80 MMHG | WEIGHT: 209.13 LBS | OXYGEN SATURATION: 98 %

## 2025-02-06 DIAGNOSIS — E66.2 OBESITY WITH ALVEOLAR HYPOVENTILATION AND BODY MASS INDEX (BMI) OF 30 TO LESS THAN 35: Primary | ICD-10-CM

## 2025-02-06 DIAGNOSIS — M72.2 PLANTAR FASCIITIS OF LEFT FOOT: ICD-10-CM

## 2025-02-06 PROCEDURE — 3288F FALL RISK ASSESSMENT DOCD: CPT | Mod: CPTII,S$GLB,,

## 2025-02-06 PROCEDURE — 1125F AMNT PAIN NOTED PAIN PRSNT: CPT | Mod: CPTII,S$GLB,,

## 2025-02-06 PROCEDURE — 3079F DIAST BP 80-89 MM HG: CPT | Mod: CPTII,S$GLB,,

## 2025-02-06 PROCEDURE — 3074F SYST BP LT 130 MM HG: CPT | Mod: CPTII,S$GLB,,

## 2025-02-06 PROCEDURE — 1160F RVW MEDS BY RX/DR IN RCRD: CPT | Mod: CPTII,S$GLB,,

## 2025-02-06 PROCEDURE — 99214 OFFICE O/P EST MOD 30 MIN: CPT | Mod: S$GLB,,,

## 2025-02-06 PROCEDURE — 99999 PR PBB SHADOW E&M-EST. PATIENT-LVL III: CPT | Mod: PBBFAC,,,

## 2025-02-06 PROCEDURE — 3044F HG A1C LEVEL LT 7.0%: CPT | Mod: CPTII,S$GLB,,

## 2025-02-06 PROCEDURE — 3008F BODY MASS INDEX DOCD: CPT | Mod: CPTII,S$GLB,,

## 2025-02-06 PROCEDURE — 1159F MED LIST DOCD IN RCRD: CPT | Mod: CPTII,S$GLB,,

## 2025-02-06 PROCEDURE — 1101F PT FALLS ASSESS-DOCD LE1/YR: CPT | Mod: CPTII,S$GLB,,

## 2025-02-06 RX ORDER — MELOXICAM 15 MG/1
15 TABLET ORAL DAILY
Qty: 14 TABLET | Refills: 0 | Status: SHIPPED | OUTPATIENT
Start: 2025-02-06

## 2025-02-06 NOTE — TELEPHONE ENCOUNTER
Spoke to pt. earlier she said she received >80% of relief from MBB#1, message was sent to Dr. Mendoza.

## 2025-02-06 NOTE — PROGRESS NOTES
[facility]    Subjective     Patient Name: Ginger Marshall  YOB: 1955  Patient Age: 69 y.o.  Patient Sex: female  Patient Phone: [phone]  PCP: Timbo Rodriguez MD  Last PCP Appointment: 2/6/2025    History of Present Illness    CHIEF COMPLAINT:  Ms. Marshall presents today for follow-up on weight loss progress.    WEIGHT MANAGEMENT:  She reports 4-pound weight loss over 4 weeks while taking Mounjaro. She notes decreased appetite with frequent meal skipping, particularly breakfast. She is tolerating Mounjaro well and denies GI side effects. She takes stool softener regularly and occasional laxative as needed.    BACK PAIN:  She reports back pain radiating down the left side, describing a numb sensation in a ribbon-like area. She experiences pins and needles and burning sensations in this region with overexertion. She recently underwent diagnostic injections which provided significant pain relief and is scheduled for nerve ablation. She continues Lyrica for pain management with recent dose increase, reporting improved pain control without side effects. She declines previously suggested surgical intervention with rods and pins.    LEFT HEEL PAIN:  She reports left heel pain described as similar to a bruised heel. Meloxicam has been highly effective, providing complete pain relief after few doses.    CURRENT SYMPTOMS:  She reports recent onset of cold symptoms including nasal congestion, rhinorrhea, and sneezing. She is taking Mucinex DM for symptom management.    MEDICATIONS:  She continues thyroxine and amlodipine.    PREVENTIVE CARE:  She received flu vaccination at [facility].    TRAVEL:  She has an upcoming trip to [facility] on March 9th and reports being up to date on required vaccinations.    Answers submitted by the patient for this visit:  Back Pain Questionnaire (Submitted on 1/30/2025)  Chief Complaint: Back pain  Chronicity: chronic  Onset: more than 1 year ago  Frequency:  constantly  Progression since onset: gradually worsening  Pain location: lumbar spine  Pain quality: aching, burning, shooting  Radiates to: left foot, left thigh  Pain - numeric: 6/10  Pain is: worse during the night  Aggravated by: bending, position, standing  Stiffness is present: all day  abdominal pain: No  bladder incontinence: No  bowel incontinence: No  chest pain: No  dysuria: No  fever: No  headaches: No  leg pain: Yes  numbness: Yes  paresis: No  paresthesias: Yes  pelvic pain: No  perianal numbness: No  tingling: Yes  weakness: Yes  weight loss: No  genital pain: No  hematuria: No  Risk factors: history of cancer, history of osteoporosis, lack of exercise, menopause, obesity, poor posture, sedentary lifestyle  Pain severity: moderate  Improvement on treatment: mild        Health Maintenance Summary            Ordered - Diabetic Eye Exam (Yearly) Ordered on 9/11/2024 03/25/2019  Outside Claim: [place] EYE EXAM, EST PATIENT,COMPREHESV    03/25/2019  Outside Claim: [place] FUNDAL PHOTOGRAPHY              Overdue - COVID-19 Vaccine (7 - 2024-25 season) Overdue since 9/1/2024      10/12/2023  Imm Admin: COVID-19, mRNA, LNP-S, PF, neeta-sucrose, 30 mcg/0.3 mL (Pfizer 2023 Ages 12+)    10/11/2022  Imm Admin: COVID-19, mRNA, LNP-S, bivalent booster, PF (PFIZER OMICRON)    04/14/2022  Imm Admin: COVID-19, MRNA, LN-S, PF (Pfizer) (Gray Cap)    11/19/2021  Imm Admin: COVID-19, MRNA, LN-S, PF (Pfizer) (Purple Cap)    03/15/2021  Imm Admin: COVID-19, MRNA, LN-S, PF (Pfizer) (Purple Cap)    Only the first 5 history entries have been loaded, but more history exists.              Mammogram (Yearly) Next due on 6/27/2025 06/27/2024  Mammo Digital Screening Bilat w/ Pa    06/08/2023  Mammo Digital Screening Bilat w/ Pa    01/20/2022  Mammo Digital Screening Bilat w/ Pa    08/27/2020  Mammo Digital Screening Bilat w/ Pa              Hemoglobin A1c (Every 6 Months) Next due on 7/29/2025 01/29/2025  Hemoglobin  A1C External component of Hemoglobin A1C    10/07/2024  Hemoglobin A1C External component of Hemoglobin A1c    09/11/2023  Hemoglobin A1C External component of Hemoglobin A1C    08/24/2022  Hemoglobin A1C External component of Hemoglobin A1C    01/07/2021  Hemoglobin A1C External component of Hemoglobin A1C    Only the first 5 history entries have been loaded, but more history exists.              DEXA Scan (Every 3 Years) Next due on 8/24/2025 08/24/2022  DXA Bone Density Spine And Hip              Diabetes Urine Screening (Yearly) Next due on 10/7/2025      10/07/2024  MICROALB/CREAT RATIO component of Microalbumin/creatinine urine ratio              Foot Exam (Yearly) Next due on 1/9/2026 01/09/2025  Done              Lipid Panel (Yearly) Next due on 1/29/2026 01/29/2025  Cholesterol Total component of Lipid Panel    10/07/2024  Cholesterol Total component of Lipid panel    09/11/2023  Cholesterol Total component of Lipid Panel    08/24/2022  Cholesterol Total component of Lipid Panel    01/07/2021  Cholesterol Total component of Lipid Panel    Only the first 5 history entries have been loaded, but more history exists.              Low Dose Statin (Yearly) Next due on 2/6/2026 02/06/2025  Registry Metric: Last Current Statin Reviewed Date    01/27/2025  Registry Metric: Last Current Statin Order Date              Colorectal Cancer Screening (Colonoscopy - Every 7 Years) Next due on 1/24/2029 01/24/2022  Colonoscopy    01/24/2022  Surgical Procedure: COLONOSCOPY    09/03/2020  Fecal Immunochemical Test (iFOBT) component of Fecal Immunochemical Test (iFOBT)              TETANUS VACCINE (Every 10 Years) Next due on 10/26/2032      10/26/2022  Imm Admin: Tdap    04/12/2016  Imm Admin: Tdap              Shingles Vaccine (Series Information) Completed      10/02/2020  Done - pt unsure of exact date; at pharmacy ("[facility] or with Dr. Mcwilliams    12/12/2018  Imm Admin: Zoster Recombinant     06/19/2018  Imm Admin: Zoster Recombinant              Hepatitis C Screening  Completed      01/07/2021  Hepatitis C Ab component of Hepatitis C Antibody              Pneumococcal Vaccines (Age 50+) (Series Information) Completed      08/23/2022  Imm Admin: Pneumococcal Conjugate - 20 Valent              RSV Vaccine (Age 60+ and Pregnant patients) (Series Information) Completed      10/16/2023  Imm Admin: Rsv, Bivalent, Rsvpref (Abrysvo)              Influenza Vaccine (Series Information) Completed      10/10/2024  Done    10/12/2023  Imm Admin: Influenza - Quadrivalent - High Dose - PF (65 years and older)    10/26/2022  Imm Admin: Influenza - Quadrivalent - High Dose - PF (65 years and older)    11/20/2021  Imm Admin: Influenza    10/12/2021  Imm Admin: Influenza - Quadrivalent - PF *Preferred* (6 months and older)    Only the first 5 history entries have been loaded, but more history exists.                    Prior to Admission medications    Medication Sig Start Date End Date Taking? Authorizing Provider   amLODIPine (NORVASC) 5 MG tablet TAKE 1 TABLET(5 MG) BY MOUTH EVERY DAY 8/12/24  Yes Deysi Zamorano MD   atorvastatin (LIPITOR) 40 MG tablet TAKE 1 TABLET(40 MG) BY MOUTH EVERY DAY 1/27/25  Yes Timbo Rodriguez MD   levothyroxine (SYNTHROID) 112 MCG tablet Take 1 tablet (112 mcg total) by mouth once daily. 9/9/24  Yes Deysi Zamorano MD   metFORMIN (GLUCOPHAGE) 500 MG tablet TAKE 1 TABLET(500 MG) BY MOUTH DAILY WITH BREAKFAST 1/3/25  Yes Deysi Zamorano MD   multivitamin capsule Take 1 capsule by mouth once daily.   Yes Provider, Historical   pregabalin (LYRICA) 75 MG capsule Take 1 capsule (75 mg total) by mouth 3 (three) times daily. TAKE 1 CAPSULE(75 MG) BY MOUTH THREE TIMES DAILY 1/9/25 1/4/26 Yes Timbo Rodriguez MD   tirzepatide (MOUNJARO) 2.5 mg/0.5 mL PnIj Inject 2.5 mg into the skin every 7 days. 1/10/25  Yes Timbo Rodriguez MD   tirzepatide (MOUNJARO) 5 mg/0.5 mL PnIj Inject 5 mg into the skin  every 7 days. 1/30/25  Yes Timbo Rodriguez MD   valACYclovir (VALTREX) 500 MG tablet TAKE 1 TABLET(500 MG) BY MOUTH EVERY DAY 7/23/24  Yes Katty Vickers MD   meloxicam (MOBIC) 15 MG tablet Take 1 tablet (15 mg total) by mouth once daily. 2/6/25   Timbo Rodriguez MD       OBJECTIVE:     Vitals:    02/06/25 1310   BP: 124/80   BP Location: Left arm   Patient Position: Sitting   Pulse: 71   SpO2: 98%   Weight: 94.8 kg (209 lb 1.7 oz)       Body mass index is 33.75 kg/m².     PHYSICAL EXAM  GEN - A+OX4, NAD, obesity  HEENT - PERRL, EOMI, OP clear  Neck - No thyromegaly or cervical LAD.   CV - RRR, no m/r   Chest - CTAB, no wheezing or rhonchi  Abd - S/NT/ND/+BS.   Ext - 2+BDP and radial pulses. No C/C/E.  MSK - normal ROM, no tenderness  Neuro - 5/5 BUE and BLE strength.  LN - No axillary or inguinal LAD appreciated.  Skin - No rash.     LABS  Lab Results   Component Value Date    WBC 5.30 01/29/2025    HGB 14.2 01/29/2025    HCT 42.6 01/29/2025    MCV 93 01/29/2025     01/29/2025         CMP  Sodium   Date Value Ref Range Status   01/29/2025 142 136 - 145 mmol/L Final     Potassium   Date Value Ref Range Status   01/29/2025 4.2 3.5 - 5.1 mmol/L Final     Chloride   Date Value Ref Range Status   01/29/2025 108 95 - 110 mmol/L Final     CO2   Date Value Ref Range Status   01/29/2025 24 23 - 29 mmol/L Final     Glucose   Date Value Ref Range Status   01/29/2025 96 70 - 110 mg/dL Final     BUN   Date Value Ref Range Status   01/29/2025 17 8 - 23 mg/dL Final     Creatinine   Date Value Ref Range Status   01/29/2025 0.9 0.5 - 1.4 mg/dL Final     Calcium   Date Value Ref Range Status   01/29/2025 9.3 8.7 - 10.5 mg/dL Final     Total Protein   Date Value Ref Range Status   01/29/2025 7.4 6.0 - 8.4 g/dL Final     Albumin   Date Value Ref Range Status   01/29/2025 4.1 3.5 - 5.2 g/dL Final     Total Bilirubin   Date Value Ref Range Status   01/29/2025 0.5 0.1 - 1.0 mg/dL Final     Comment:     For infants and newborns,  interpretation of results should be based  on gestational age, weight and in agreement with clinical  observations.    Premature Infant recommended reference ranges:  Up to 24 hours.............<8.0 mg/dL  Up to 48 hours............<12.0 mg/dL  3-5 days..................<15.0 mg/dL  6-29 days.................<15.0 mg/dL       Alkaline Phosphatase   Date Value Ref Range Status   01/29/2025 55 40 - 150 U/L Final     AST   Date Value Ref Range Status   01/29/2025 18 10 - 40 U/L Final     ALT   Date Value Ref Range Status   01/29/2025 16 10 - 44 U/L Final     Anion Gap   Date Value Ref Range Status   01/29/2025 10 8 - 16 mmol/L Final     eGFR   Date Value Ref Range Status   01/29/2025 >60 >60 mL/min/1.73 m^2 Final       ASSESSMENT & PLAN:   Ms. Ginger Marshall is a 69 y.o. female who was seen in clinic today for scheduled follow up after starting mounjaro for weight management. Congratulated Ms Marshall on her 4 lb weight loss in 4 weeks with no GI side effects. Assessed TSH level, which was at low-normal but not clinically concerning; will continue to actively monitor. Reviewed patient's upcoming lumbar nerve ablation procedure, noting positive response to diagnostic injections. Evaluated current Lyrica dosage, noting improved symptoms after recent increase. Considered prescribing antiemetic medication for upcoming international travel. Refill ordered for meloxicam to manage acute foot pain. Routine follow up to be scheduled in 2 months for next mounjaro dose increase.      1. 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  Overview:  - Obesity managed with mounjaro 5mg INJ weekly  - DM2 managed with metformin 500mg qd and mounjaro 5mg INJ weekly   - HTN managed with amlodipine 5mg qd    Assessment & Plan:  Wt Readings from Last 8 Encounters:   02/06/25 94.8 kg (209 lb 1.7 oz)   02/05/25 90.7 kg (200 lb)   01/14/25 96.9 kg (213 lb 10 oz)   01/09/25 96.7 kg (213 lb 4.7 oz)   06/04/24 94  kg (207 lb 3.7 oz)   05/16/24 94.2 kg (207 lb 10.8 oz)   04/24/24 88.9 kg (195 lb 15.8 oz)   03/13/24 88.9 kg (196 lb)     BP Readings from Last 5 Encounters:   02/06/25 124/80   02/05/25 126/64   01/14/25 122/80   01/09/25 130/78   06/04/24 118/78     Lab Results   Component Value Date    HGBA1C 5.6 01/29/2025    HGBA1C 5.5 10/07/2024    HGBA1C 5.8 (H) 09/11/2023    HGBA1C 5.6 08/24/2022    HGBA1C 5.7 (H) 01/07/2021   - Great response to mounjaro with lester tdosing at 5mg INJ weekly  - Plan to advance to 7.5mg weekly for next month  - Diabetes and HTN adequately controlled w/in acceptable limits  - Continue current regimen for DM and HTN  - Continue low fat/cholesterol/sugar, cardiac diet  - Limit sodium intake to <2g daily  - Recommend food diary  - Regular home BP and glucose check/logs  - Complete annual diabetic eye & foot examinations  - Consider future referral to in-clinic Health  or nutrition  - Aerobic exercise as tolerated, goal 150min/week        2. Plantar fasciitis of left foot  Overview:  - Acute on chronic left heel pain  - Suspicious for plantar fascitis   - Completed 14 day course of meloxicam with good therapeutic resopnse    Assessment & Plan:  - Return to baseline activity  - Recommend shoes with adequate cushion and support to prevent re-aggravation  - May restart meloxicam for 14 days in setting of acute flare    Orders:  -     meloxicam (MOBIC) 15 MG tablet; Take 1 tablet (15 mg total) by mouth once daily.  Dispense: 14 tablet; Refill: 0         Follow up in about 2 months (around 4/6/2025).     All questions answered. Pt to call/message the Primary Clinic for any additional concerns    I spent a total of 43 minutes on the day of the visit.        Timbo Rodriguez MD  [facility]    This note was generated with the assistance of ambient listening technology. Verbal consent was obtained by the patient and accompanying visitor(s) for the recording of patient  appointment to facilitate this note. I attest to having reviewed and edited the generated note for accuracy, though some syntax or spelling errors may persist. Please contact the author of this note for any clarification.

## 2025-02-07 ENCOUNTER — TELEPHONE (OUTPATIENT)
Dept: PAIN MEDICINE | Facility: CLINIC | Age: 70
End: 2025-02-07
Payer: MEDICARE

## 2025-02-07 DIAGNOSIS — M47.816 LUMBAR SPONDYLOSIS: Primary | ICD-10-CM

## 2025-02-07 NOTE — TELEPHONE ENCOUNTER
----- Message from Deepa Mendoza sent at 2025  8:22 AM CST -----  Regarding: Order for JAKE ALICEA    Patient Name: JAKE ALICEA([id number])  Sex: Female  : 1955      PCP: FIOR MCMAHAN    Center: [facility]     Types of orders made on 2025: Procedure Request    Order Date:2025  Ordering User:DEEPA MENDOZA [[id number]]  Encounter Provider:Deepa Mendoza DO [[id number]]  Authorizing Provider: Deepa Mendoza DO [[id number]]  Department:[facility] PAIN MANAGEMENT[[id number]]    Common Order Information  Procedure -> Medial Branch Block (Specify level and laterality) Cmt: L3, L4, L5             b/l    Pre-op Diagnosis -> Lumbar spondylosis     Order Specific Information  Order: Procedure Order to Pain Management [Custom: [id number]]  Order #:          [id number]: 1 FUTURE    Priority: Routine  Class: Clinic Performed    Future Order Information      Expires on:2026            Expected by:2025                   Associated Diagnoses      M47.816 Lumbar spondylosis      Physician -> mine         Facility Name: -> [place]           Priority: Routine  Class: Clinic Performed    Future Order Information      Expires on:2026            Expected by:2025                   Associated Diagnoses      M47.816 Lumbar spondylosis      Procedure -> Medial Branch Block (Specify level and laterality) Cmt: L3,                 L4, L5 b/l        Physician -> mine         Pre-op Diagnosis -> Lumbar spondylosis         Facility Name: -> [place]

## 2025-02-07 NOTE — PROGRESS NOTES
Patient reports >80% relief following first diagnostic Bilateral L3, L4, and L5 MBB.  I will schedule the patient for a second diagnostic block.     Glenny Mendoza, DO   Interventional Pain Management      
Detail Level: Detailed
Spray Paint Technique: No
Spray Paint Text: The liquid nitrogen was applied to the skin utilizing a spray paint frosting technique.
Show Applicator Variable?: Yes
Consent: Advised patient of the risk of possible scars and pigmentation at LN2 site, patient states she understand the risk
Medical Necessity Information: It is in your best interest to select a reason for this procedure from the list below. All of these items fulfill various CMS LCD requirements except the new and changing color options.
Duration Of Freeze Thaw-Cycle (Seconds): 3
Application Tool (Optional): Liquid Nitrogen Sprayer
Number Of Freeze-Thaw Cycles: 1 freeze-thaw cycle

## 2025-02-07 NOTE — H&P (VIEW-ONLY)
Patient reports >80% relief following first diagnostic Bilateral L3, L4, and L5 MBB.  I will schedule the patient for a second diagnostic block.     Glenny Mendoza, DO   Interventional Pain Management

## 2025-02-07 NOTE — ASSESSMENT & PLAN NOTE
- Return to baseline activity  - Recommend shoes with adequate cushion and support to prevent re-aggravation  - May restart meloxicam for 14 days in setting of acute flare

## 2025-02-07 NOTE — ASSESSMENT & PLAN NOTE
Wt Readings from Last 8 Encounters:   02/06/25 94.8 kg (209 lb 1.7 oz)   02/05/25 90.7 kg (200 lb)   01/14/25 96.9 kg (213 lb 10 oz)   01/09/25 96.7 kg (213 lb 4.7 oz)   06/04/24 94 kg (207 lb 3.7 oz)   05/16/24 94.2 kg (207 lb 10.8 oz)   04/24/24 88.9 kg (195 lb 15.8 oz)   03/13/24 88.9 kg (196 lb)     BP Readings from Last 5 Encounters:   02/06/25 124/80   02/05/25 126/64   01/14/25 122/80   01/09/25 130/78   06/04/24 118/78     Lab Results   Component Value Date    HGBA1C 5.6 01/29/2025    HGBA1C 5.5 10/07/2024    HGBA1C 5.8 (H) 09/11/2023    HGBA1C 5.6 08/24/2022    HGBA1C 5.7 (H) 01/07/2021   - Great response to mounjaro with curren tdosing at 5mg INJ weekly  - Plan to advance to 7.5mg weekly for next month  - Diabetes and HTN adequately controlled w/in acceptable limits  - Continue current regimen for DM and HTN  - Continue low fat/cholesterol/sugar, cardiac diet  - Limit sodium intake to <2g daily  - Recommend food diary  - Regular home BP and glucose check/logs  - Complete annual diabetic eye & foot examinations  - Consider future referral to in-clinic Health  or nutrition  - Aerobic exercise as tolerated, goal 150min/week

## 2025-02-07 NOTE — TELEPHONE ENCOUNTER
----- Message from Deepa Mendoza sent at 2025  8:22 AM CST -----  Regarding: Order for JAKE ALICEA    Patient Name: JAKE ALICEA([id number])  Sex: Female  : 1955      PCP: FIOR MCMAHAN    Center: [facility]     Types of orders made on 2025: Procedure Request    Order Date:2025  Ordering User:DEEPA MENDOZA [[id number]]  Encounter Provider:Deepa Mendoza DO [[id number]]  Authorizing Provider: Deepa Mendoza DO [[id number]]  Department:[facility] PAIN MANAGEMENT[[id number]]    Common Order Information  Procedure -> Medial Branch Block (Specify level and laterality) Cmt: L3, L4, L5             b/l    Pre-op Diagnosis -> Lumbar spondylosis     Order Specific Information  Order: Procedure Order to Pain Management [Custom: [id number]]  Order #:          [id number]: 1 FUTURE    Priority: Routine  Class: Clinic Performed    Future Order Information      Expires on:2026            Expected by:2025                   Associated Diagnoses      M47.816 Lumbar spondylosis      Physician -> mine         Facility Name: -> [place]           Priority: Routine  Class: Clinic Performed    Future Order Information      Expires on:2026            Expected by:2025                   Associated Diagnoses      M47.816 Lumbar spondylosis      Procedure -> Medial Branch Block (Specify level and laterality) Cmt: L3,                 L4, L5 b/l        Physician -> mine         Pre-op Diagnosis -> Lumbar spondylosis         Facility Name: -> [place]

## 2025-02-10 RX ORDER — ONDANSETRON 4 MG/1
8 TABLET, ORALLY DISINTEGRATING ORAL EVERY 8 HOURS PRN
Qty: 21 TABLET | Refills: 0 | Status: SHIPPED | OUTPATIENT
Start: 2025-02-10

## 2025-02-17 ENCOUNTER — TELEPHONE (OUTPATIENT)
Dept: PAIN MEDICINE | Facility: CLINIC | Age: 70
End: 2025-02-17
Payer: MEDICARE

## 2025-02-20 NOTE — PRE-PROCEDURE INSTRUCTIONS
Patient reviewed on 2/20/2025.  Okay to proceed at [place]. The following pre-procedure instructions and arrival time have been reviewed with patient via phone and sent to patient portal for review.  Patient verbalized an understanding.  Pt to be accompanied by -Mustapha day of procedure as responsible .      Dear Ginger,     Please read over the following pre-procedure instructions in it's entirety as there is helpful information here to get you well prepared for your upcoming procedure.     You are scheduled for a procedure with Dr. Mendoza on 2/24/25.     [facility] is located at the corner of [facility] and [facility]. It is in the [place] [address] [place] next to [facility]. The address is: [address] [place] [address]. Take the elevator to the 2nd floor.      Registration check in time: 11:30 am  Scheduled procedure time: 1:15 pm     You are scheduled to receive:                 ___X___Oral sedation                                                                 _______IV Sedation                                                                 _______No Sedation                                                                                           If you are receiving any sedation, you CANNOT drive yourself and must have a responsible friend or family member (no rideshare) to drive you home.     You should take any medications that you routinely take for blood pressure, heart medications, thyroid, cholesterol, etc.      *The fasting restrictions are dependent on whether or not you are receiving sedation. Sedation is not available for all procedures.      Your fasting instructions for sedation patients are as follow:  IV sedation. Nothing to eat after midnight the night prior to procedure. Patients are encouraged to consume clear liquids up to 2 hours prior to scheduled arrival time. -Clear liquids include Gatorade, water, soda, black coffee or tea (no milk or creamer), and clear juices.  - Clear liquids do NOT include anything with pulp or food particles (chicken broth, ice cream, yogurt, Jello, etc.) You CANNOT drive yourself and must have a .            If you are on blood thinners, you need to follow the anticoagulation instructions that had been discussed previously. You should only stop the blood thinners if it was approved by your primary care physician or your cardiologist. In the event that you are not able to stop your blood thinners, a blood thinner was not listed on your medication list, or we were not able to get clearance from your cardiologist, then the procedure may have to be postponed/canceled.      IF you were told to stop your blood thinners, this is how long you should generally hold some of the more common ones. Remember that stopping blood thinners is only necessary for certain procedures. If you are unsure of your instructions, please call us.   Aspirin - 5 days  Plavix/Clopidogrel - 7 days  Warfarin / Coumadin - 5 days  Eliquis - 3 days  Pradaxa/Dabigatran - 4 days  Xarelto/Rivaroxaban - 3 days        HOLD all non-insulin injections (shots) until after surgery (Semaglutide, Tirzepatide, Ozempic, Mounjaro, Trulicity, Victoza, Byetta, Wegovy and Adlyxin) (Total of 7 days prior)        If you are a diabetic, do not take your medication if you will be fasting, but bring it with you. Please plan on being here for roughly 2-3 hours. Please note that most procedures will not be performed if you blood sugar is >200.     Please call us if you have been sick (running fever, having any flu-like symptoms) or have been taking ANTIBIOTICS in the past 2 weeks or had any outpatient procedures other than with us (colonoscopy, endoscopy, OBGYN, dental, etc.).      If you have been previously COVID positive, you will need to hold off on your procedure until you are symptom free for 10 days. If you did not have any symptoms, you can have your procedure 10 days from your positive test  result.         On the morning of your procedure:  *HOLD ALL VITAMINS, MINERALS, HERBS (INCLUDING HERBAL TEAS) AND SUPPLEMENTS  *SHOWER WITH ANTIBACTERIAL SOAP (EX. DIAL) NIGHT BEFORE AND MORNING OF PROCEDURE  *DO NOT APPLY ANY LOTIONS, OILS, POWDERS, PERFUME/COLOGNE, OINTMENTS, GELS, CREAMS, MAKEUP OR DEODORANT TO YOUR SKIN MORNING OF PROCEDURE  *LEAVE JEWELRY AND ANY VALUABLES AT HOME  *WEAR LOOSE COMFORTABLE CLOTHING       In the event that you are running late or need to reschedule on the day of your procedure, please contact the pre-op desk at [phone].       Please reply to this portal message as receipt of delivery.     Thank you,  [facility]  [place], LPN [facility]  Pre-Admit

## 2025-02-24 ENCOUNTER — HOSPITAL ENCOUNTER (OUTPATIENT)
Facility: HOSPITAL | Age: 70
Discharge: HOME | End: 2025-02-24
Attending: STUDENT IN AN ORGANIZED HEALTH CARE EDUCATION/TRAINING PROGRAM | Admitting: STUDENT IN AN ORGANIZED HEALTH CARE EDUCATION/TRAINING PROGRAM
Payer: MEDICARE

## 2025-02-24 VITALS
OXYGEN SATURATION: 98 % | DIASTOLIC BLOOD PRESSURE: 72 MMHG | BODY MASS INDEX: 31.39 KG/M2 | WEIGHT: 200 LBS | HEIGHT: 67 IN | HEART RATE: 61 BPM | RESPIRATION RATE: 16 BRPM | SYSTOLIC BLOOD PRESSURE: 124 MMHG | TEMPERATURE: 98 F

## 2025-02-24 DIAGNOSIS — G89.29 CHRONIC PAIN: ICD-10-CM

## 2025-02-24 DIAGNOSIS — M47.816 LUMBAR SPONDYLOSIS: Primary | ICD-10-CM

## 2025-02-24 DIAGNOSIS — M72.2 PLANTAR FASCIITIS OF LEFT FOOT: ICD-10-CM

## 2025-02-24 LAB — POCT GLUCOSE: 87 MG/DL (ref 70–110)

## 2025-02-24 PROCEDURE — 82962 GLUCOSE BLOOD TEST: CPT | Performed by: STUDENT IN AN ORGANIZED HEALTH CARE EDUCATION/TRAINING PROGRAM

## 2025-02-24 PROCEDURE — 25000003 PHARM REV CODE 250: Performed by: STUDENT IN AN ORGANIZED HEALTH CARE EDUCATION/TRAINING PROGRAM

## 2025-02-24 PROCEDURE — 64493 INJ PARAVERT F JNT L/S 1 LEV: CPT | Mod: 50 | Performed by: STUDENT IN AN ORGANIZED HEALTH CARE EDUCATION/TRAINING PROGRAM

## 2025-02-24 PROCEDURE — 63600175 PHARM REV CODE 636 W HCPCS: Performed by: STUDENT IN AN ORGANIZED HEALTH CARE EDUCATION/TRAINING PROGRAM

## 2025-02-24 PROCEDURE — 64494 INJ PARAVERT F JNT L/S 2 LEV: CPT | Mod: 50,,, | Performed by: STUDENT IN AN ORGANIZED HEALTH CARE EDUCATION/TRAINING PROGRAM

## 2025-02-24 PROCEDURE — 64493 INJ PARAVERT F JNT L/S 1 LEV: CPT | Mod: 50,,, | Performed by: STUDENT IN AN ORGANIZED HEALTH CARE EDUCATION/TRAINING PROGRAM

## 2025-02-24 PROCEDURE — 64494 INJ PARAVERT F JNT L/S 2 LEV: CPT | Mod: 50 | Performed by: STUDENT IN AN ORGANIZED HEALTH CARE EDUCATION/TRAINING PROGRAM

## 2025-02-24 RX ORDER — BUPIVACAINE HYDROCHLORIDE 2.5 MG/ML
INJECTION, SOLUTION EPIDURAL; INFILTRATION; INTRACAUDAL
Status: DISCONTINUED | OUTPATIENT
Start: 2025-02-24 | End: 2025-02-24 | Stop reason: HOSPADM

## 2025-02-24 RX ORDER — LIDOCAINE HYDROCHLORIDE 20 MG/ML
INJECTION, SOLUTION EPIDURAL; INFILTRATION; INTRACAUDAL; PERINEURAL
Status: DISCONTINUED | OUTPATIENT
Start: 2025-02-24 | End: 2025-02-24 | Stop reason: HOSPADM

## 2025-02-24 RX ORDER — ALPRAZOLAM 0.5 MG/1
0.5 TABLET, ORALLY DISINTEGRATING ORAL ONCE AS NEEDED
Status: COMPLETED | OUTPATIENT
Start: 2025-02-24 | End: 2025-02-24

## 2025-02-24 RX ADMIN — ALPRAZOLAM 0.5 MG: 0.5 TABLET, ORALLY DISINTEGRATING ORAL at 12:02

## 2025-02-24 NOTE — OP NOTE
Diagnostic Lumbar Medial Branch Block Under Fluoroscopy    The procedure, risks, benefits, and options were discussed with the patient. There are no contraindications to the procedure. The patent expressed understanding and agreed to the procedure. Informed written consent was obtained prior to the start of the procedure and can be found in the patient's chart.    PATIENT NAME: Ginger Marshall   MRN: [id number]     DATE OF PROCEDURE: 02/24/2025                                           PROCEDURE:  Diagnostic Bilateral L3, L4, and L5 Lumbar Medial Branch Block under Fluoroscopy    PRE-OP DIAGNOSIS: Lumbar spondylosis [M47.816] Lumbar spondylosis [M47.816]    POST-OP DIAGNOSIS: Same    PHYSICIAN: Glenny Mendoza DO    ASSISTANTS: Mikel Roberto MD  [facility]      MEDICATIONS INJECTED:  Bupivicaine 0.25%    LOCAL ANESTHETIC INJECTED:   Xylocaine 2%    SEDATION: None    ESTIMATED BLOOD LOSS:  None    COMPLICATIONS:  None.    INTERVAL HISTORY: Patient has clinical and imaging findings suggestive of facet mediated pain. Patient had a previous diagnostic block performed with at least 80% relief in pain and/or at least 50% improvement in the ability to perform previously painful movements and ADLs for the expected duration of the local anesthetic utilized.    TECHNIQUE: Time-out was performed to identify the patient and procedure to be performed. With the patient laying in a prone position, the surgical area was prepped and draped in the usual sterile fashion using ChloraPrep and fenestrated drape. The levels were determined under fluoroscopic guidance. Skin anesthesia was achieved by injecting Lidocaine 2% over the injection sites. A 22 gauge, 3.5 inch needle was introduced into the medial branch nerves at the junctions of the superior articular process and the transverse processes of the targeted sites using AP, lateral and/or contralateral oblique fluoroscopic imaging. After negative aspiration for blood or CSF was  confirmed, 1 mL of the anesthetic listed above was then slowly injected at each site. The needles were removed and bleeding was nil. A sterile dressing was applied. No specimens collected. The patient tolerated the procedure well.      The patient was monitored after the procedure in the recovery area. They were given post-procedure and discharge instructions to follow at home. The patient was discharged in a stable condition.    Glenny Mendoza DO     I reviewed and edited the fellow's note. I conducted my own interview and physical examination. I agree with the findings. I was present and supervising all critical portions of the procedure.

## 2025-02-24 NOTE — DISCHARGE INSTRUCTIONS
[facility]  Dr. Glenny Mendoza  Messaging service # [phone]    MEDIAL BRANCH BLOCK POST-PROCEDURE INSTRUCTIONS:    What you need to do:  Keep a record of your response to the injection you had today.  It is very important that you accurately record how you responded to today's injection.  Please try to separate any soreness from the needle from your usual pain.  We want to know how this affects your usual pain.  Also REMEMBER that today's injection is a DIAGNOSTIC block, the pain relief will only last for a few hours to a few days.  Insurance requires 2 of these blocks before progressing to the ablation.  The sole purpose of this injection is to give us information, and not to treat your pain for an extended period.    Think about the amount of pain that you would have doing the most painful activities (I.e. bending, twisting, walking, standing straight, cleaning, etc...).  Now do those same activities as soon as you get home from the hospital.  Think about how much better you feel doing those activities now that the injection is done.  Does it feel 50%, 80%, 100% better than it would have otherwise?  This is the number you need to send me.    Send me a message through [facility] or leave a message at the phone number above telling me what % of improvement you got from the injection.    If you have difficulty putting a percentage on your pain relief fill this out:  (Rate each question below from 0-10 with 0 being no pain and 10 being the worst imaginable pain)    How bad would your pain get during activities like walking/going up stairs BEFORE the injection? _______    For the first 8 hours AFTER the injection, when you did those same painful activities, what was you best pain score? ____________    Send me those two numbers if you aren't able to give a  percentage.  ---------------------------------------------------------------------------------------------------------------------------------------------------------------------------------------------  What to watch out for:    If you experience any of the following symptoms after your procedure, please notify the messaging service immediately (see above for contact information):   fever (increased oral temperature)   bleeding or swelling at the injection site,    drainage, rash or redness at the injection site    possible signs of infection    increased pain at the injection site   worsening of your usual pain   severe headache   new or worsening numbness    new arm and/or leg weakness, or    changes in bowel and/or bladder function: urinating or defecating on yourself and not knowing that you did it.    PLEASE FOLLOW ALL INSTRUCTIONS CAREFULLY     Do not engage in strenuous activity (e.g., lifting or pushing heavy objects or repeated bending) for 24 hours.     Do not take a bath, swim or use Jacuzzi for 24 hours after procedure. (A shower is fine).   Remove any Band-Aids when you get home.    Use cold/ice, as needed for comfort.  We recommend the use of cold therapy alternating on for 20 minutes, off for 20 minutes.    Do not apply direct heat (heating pad or heat packs) to the injection site for 24 hours.     Resume your usual medications, unless instructed otherwise by your Pain Physician.     If you are on warfarin (Coumadin) or other blood thinner, resume this medication as instructed by your prescribing Physician.    IF AT ANY POINT YOU ARE VERY CONCERNED ABOUT YOUR SYMPTOMS, PLEASE GO TO THE EMERGENCY ROOM.    If you develop worsening pain, weakness, numbness, lose bowel or bladder control (i.e., having an accident where you did not even know you had to go to the bathroom and suddenly noticed you soiled yourself), saddle anesthesia (a loss of sensation restricted to the area of the buttocks, anus and  between the legs -- i.e., those parts of your body that would touch a saddle if you were sitting on one) you need to go immediately to the emergency department for evaluation and treatment.    ----------------------------------------------------------------------------------------------------------------------------------------------------------------  If you received Sedation please read the following instructions:  POST SEDATION INSTRUCTIONS    Today you received intravenous medication (also known as sedation) that was used to help you relax and/or decrease discomfort during your procedure. This medication will be acting in your body for the next 24 hours, so you might feel a little tired or sleepy. This feeling will slowly wear off.   Common side effects associated with these medications include: drowsiness, dizziness, sleepiness, confusion, feeling excited, difficulty remembering things, lack of steadiness with walking or balance, loss of fine muscle control, slowed reflexes, difficulty focusing, and blurred vision.  Some over-the-counter and prescription medications (e.g., muscle relaxants, opioids, mood-altering medications, sedatives/hypnotics, antihistamines) can interact with the intravenous medication you received and cause an increased risk of the side effects listed above in addition to other potentially life threatening side effects. Use extreme caution if you are taking such medications, and consult with your Pain Physician or prescribing physician if you have any questions.  For the next 12-24 hours:    DO NOT--Drive a car, operate machinery or power tools   DO NOT--Drink any alcoholic beverages (not even beer), they may dangerously increase the risk of side effects.    DO NOT--Make any important legal or business decisions or sign important documents.  We advise you to have someone to assist you at home. Move slowly and carefully. Do not make sudden changes in position. Be aware of dizziness or  light-headedness and move accordingly.   If you seek medical treatment within 24 hours, let the nurse or doctor caring for you know that you have received the above medications. If you have any questions or concerns related to your sedation or treatment today please contact us.

## 2025-02-24 NOTE — DISCHARGE SUMMARY
Discharge Note  Short Stay      SUMMARY     Admit Date: 2/24/2025    Attending Physician: Glenny Mendoza      Discharge Physician: Glenny Mendoza      Discharge Date: 2/24/2025 1:12 PM    Procedure(s) (LRB):  L3, L4, L5 b/l MBB #2 (Bilateral)    Final Diagnosis: Lumbar spondylosis [M47.816]    Disposition: Home or self care    Patient Instructions:   Current Discharge Medication List        CONTINUE these medications which have NOT CHANGED    Details   amLODIPine (NORVASC) 5 MG tablet TAKE 1 TABLET(5 MG) BY MOUTH EVERY DAY  Qty: 90 tablet, Refills: 3    Comments: .  Associated Diagnoses: Essential hypertension      atorvastatin (LIPITOR) 40 MG tablet TAKE 1 TABLET(40 MG) BY MOUTH EVERY DAY  Qty: 90 tablet, Refills: 0    Associated Diagnoses: Hyperlipidemia, unspecified hyperlipidemia type      levothyroxine (SYNTHROID) 112 MCG tablet Take 1 tablet (112 mcg total) by mouth once daily.  Qty: 90 tablet, Refills: 3    Associated Diagnoses: Hypothyroidism, unspecified type      metFORMIN (GLUCOPHAGE) 500 MG tablet TAKE 1 TABLET(500 MG) BY MOUTH DAILY WITH BREAKFAST  Qty: 90 tablet, Refills: 0      pregabalin (LYRICA) 75 MG capsule Take 1 capsule (75 mg total) by mouth 3 (three) times daily. TAKE 1 CAPSULE(75 MG) BY MOUTH THREE TIMES DAILY  Qty: 90 capsule, Refills: 11    Associated Diagnoses: Lumbar radiculopathy; Other chronic postprocedural pain      valACYclovir (VALTREX) 500 MG tablet TAKE 1 TABLET(500 MG) BY MOUTH EVERY DAY  Qty: 30 tablet, Refills: 5    Associated Diagnoses: HSV (herpes simplex virus) infection      meloxicam (MOBIC) 15 MG tablet Take 1 tablet (15 mg total) by mouth once daily.  Qty: 14 tablet, Refills: 0    Associated Diagnoses: Plantar fasciitis of left foot      multivitamin capsule Take 1 capsule by mouth once daily.      ondansetron (ZOFRAN-ODT) 4 MG TbDL Take 2 tablets (8 mg total) by mouth every 8 (eight) hours as needed (as need for nausea).  Qty: 21 tablet, Refills: 0    Associated  Diagnoses: Nausea      tirzepatide (MOUNJARO) 2.5 mg/0.5 mL PnIj Inject 2.5 mg into the skin every 7 days.  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  Activity: as per instruction sheet.  Discharge to: Home with a responsible adult.  Follow up: 2-4 weeks       Please call my office or pager at [phone] if experienced any weakness or loss of sensation, fever > 101.5, pain uncontrolled with oral medications, persistent nausea/vomiting/or diarrhea, redness or drainage from the incisions, or any other worrisome concerns. If physician on call was not reached or could not communicate with our office for any reason please go to the nearest emergency department

## 2025-02-25 ENCOUNTER — TELEPHONE (OUTPATIENT)
Dept: PAIN MEDICINE | Facility: CLINIC | Age: 70
End: 2025-02-25
Payer: MEDICARE

## 2025-02-25 ENCOUNTER — PATIENT MESSAGE (OUTPATIENT)
Dept: PAIN MEDICINE | Facility: CLINIC | Age: 70
End: 2025-02-25
Payer: MEDICARE

## 2025-02-25 NOTE — TELEPHONE ENCOUNTER
Called pt. to ask if pt received >80%relief from MBB?, no answer, lvm, left message through pt. Portal.   ----- Message from Elis sent at 2/25/2025  4:08 PM CST -----  Type:  Patient Returning CallWho Called: Pt Does the patient know what this is regarding?: returning missed call Would the patient rather a call back or a response via [facility]? Call Best Call Back Number: [phone] Additional Information:

## 2025-02-26 ENCOUNTER — TELEPHONE (OUTPATIENT)
Dept: PAIN MEDICINE | Facility: CLINIC | Age: 70
End: 2025-02-26
Payer: MEDICARE

## 2025-02-26 DIAGNOSIS — M47.816 LUMBAR SPONDYLOSIS: Primary | ICD-10-CM

## 2025-02-26 DIAGNOSIS — M47.819 ARTHROPATHY OF FACET JOINTS AT MULTIPLE LEVELS: ICD-10-CM

## 2025-02-26 NOTE — TELEPHONE ENCOUNTER
----- Message from Deepa Mendoza sent at 2025  8:48 AM CST -----  Regarding: Order for JAKE ALICEA    Patient Name: JAKE ALICEA([id number])  Sex: Female  : 1955      PCP: FIOR MCMAHAN    Center: [facility]     Types of orders made on 2025: Procedure Request    Order Date:2025  Ordering User:DEEPA MENDOZA [[id number]  Encounter Provider:Deepa Mendoza DO [[id number]]  Authorizing Provider: Deepa Mendoza DO [[id number]]  Department:[facility][[id number]]    Common Order Information  Procedure -> Radiofrequency Ablation (Specify level and laterality) Cmt: B/l             L3, L4 L5    Pre-op Diagnosis -> Lumbar spondylosis     Order Specific Information  Order: Procedure Request Order for Pain Management [Custom: [id number]]  Order #:          [id number]: 1 FUTURE    Priority: Routine  Class: Clinic Performed    Future Order Information      Expires on:2026            Expected by:2025                   Associated Diagnoses      M47.816 Lumbar spondylosis      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

## 2025-03-08 ENCOUNTER — PATIENT MESSAGE (OUTPATIENT)
Dept: ADMINISTRATIVE | Facility: OTHER | Age: 70
End: 2025-03-08
Payer: MEDICARE

## 2025-03-19 ENCOUNTER — TELEPHONE (OUTPATIENT)
Dept: PAIN MEDICINE | Facility: CLINIC | Age: 70
End: 2025-03-19
Payer: MEDICARE

## 2025-03-24 ENCOUNTER — PATIENT MESSAGE (OUTPATIENT)
Dept: ADMINISTRATIVE | Facility: OTHER | Age: 70
End: 2025-03-24
Payer: MEDICARE

## 2025-03-24 ENCOUNTER — TELEPHONE (OUTPATIENT)
Dept: PAIN MEDICINE | Facility: HOSPITAL | Age: 70
End: 2025-03-24
Payer: MEDICARE

## 2025-03-26 ENCOUNTER — HOSPITAL ENCOUNTER (OUTPATIENT)
Facility: HOSPITAL | Age: 70
Discharge: HOME | End: 2025-03-26
Attending: STUDENT IN AN ORGANIZED HEALTH CARE EDUCATION/TRAINING PROGRAM | Admitting: STUDENT IN AN ORGANIZED HEALTH CARE EDUCATION/TRAINING PROGRAM
Payer: MEDICARE

## 2025-03-26 VITALS
SYSTOLIC BLOOD PRESSURE: 124 MMHG | HEART RATE: 64 BPM | RESPIRATION RATE: 18 BRPM | WEIGHT: 190 LBS | HEIGHT: 66 IN | OXYGEN SATURATION: 94 % | BODY MASS INDEX: 30.53 KG/M2 | TEMPERATURE: 98 F | DIASTOLIC BLOOD PRESSURE: 59 MMHG

## 2025-03-26 DIAGNOSIS — M47.816 LUMBAR SPONDYLOSIS: Primary | ICD-10-CM

## 2025-03-26 DIAGNOSIS — G89.29 CHRONIC PAIN: ICD-10-CM

## 2025-03-26 LAB — POCT GLUCOSE: 96 MG/DL (ref 70–110)

## 2025-03-26 PROCEDURE — 63600175 PHARM REV CODE 636 W HCPCS: Performed by: STUDENT IN AN ORGANIZED HEALTH CARE EDUCATION/TRAINING PROGRAM

## 2025-03-26 PROCEDURE — 99152 MOD SED SAME PHYS/QHP 5/>YRS: CPT | Mod: ,,, | Performed by: STUDENT IN AN ORGANIZED HEALTH CARE EDUCATION/TRAINING PROGRAM

## 2025-03-26 PROCEDURE — 64636 DESTROY L/S FACET JNT ADDL: CPT | Mod: 50,,, | Performed by: STUDENT IN AN ORGANIZED HEALTH CARE EDUCATION/TRAINING PROGRAM

## 2025-03-26 PROCEDURE — 64636 DESTROY L/S FACET JNT ADDL: CPT | Mod: 50 | Performed by: STUDENT IN AN ORGANIZED HEALTH CARE EDUCATION/TRAINING PROGRAM

## 2025-03-26 PROCEDURE — 64635 DESTROY LUMB/SAC FACET JNT: CPT | Mod: 50 | Performed by: STUDENT IN AN ORGANIZED HEALTH CARE EDUCATION/TRAINING PROGRAM

## 2025-03-26 PROCEDURE — 99153 MOD SED SAME PHYS/QHP EA: CPT | Performed by: STUDENT IN AN ORGANIZED HEALTH CARE EDUCATION/TRAINING PROGRAM

## 2025-03-26 PROCEDURE — 99152 MOD SED SAME PHYS/QHP 5/>YRS: CPT | Performed by: STUDENT IN AN ORGANIZED HEALTH CARE EDUCATION/TRAINING PROGRAM

## 2025-03-26 PROCEDURE — 82962 GLUCOSE BLOOD TEST: CPT | Performed by: STUDENT IN AN ORGANIZED HEALTH CARE EDUCATION/TRAINING PROGRAM

## 2025-03-26 PROCEDURE — 64635 DESTROY LUMB/SAC FACET JNT: CPT | Mod: 50,,, | Performed by: STUDENT IN AN ORGANIZED HEALTH CARE EDUCATION/TRAINING PROGRAM

## 2025-03-26 RX ORDER — MIDAZOLAM HYDROCHLORIDE 1 MG/ML
INJECTION INTRAMUSCULAR; INTRAVENOUS
Status: DISCONTINUED | OUTPATIENT
Start: 2025-03-26 | End: 2025-03-26 | Stop reason: HOSPADM

## 2025-03-26 RX ORDER — BUPIVACAINE HYDROCHLORIDE 2.5 MG/ML
INJECTION, SOLUTION EPIDURAL; INFILTRATION; INTRACAUDAL; PERINEURAL
Status: DISCONTINUED | OUTPATIENT
Start: 2025-03-26 | End: 2025-03-26 | Stop reason: HOSPADM

## 2025-03-26 RX ORDER — FENTANYL CITRATE 50 UG/ML
INJECTION, SOLUTION INTRAMUSCULAR; INTRAVENOUS
Status: DISCONTINUED | OUTPATIENT
Start: 2025-03-26 | End: 2025-03-26 | Stop reason: HOSPADM

## 2025-03-26 RX ORDER — SODIUM CHLORIDE 9 MG/ML
INJECTION, SOLUTION INTRAVENOUS CONTINUOUS
Status: DISCONTINUED | OUTPATIENT
Start: 2025-03-26 | End: 2025-03-26 | Stop reason: HOSPADM

## 2025-03-26 RX ORDER — LIDOCAINE HYDROCHLORIDE 20 MG/ML
INJECTION, SOLUTION EPIDURAL; INFILTRATION; INTRACAUDAL; PERINEURAL
Status: DISCONTINUED | OUTPATIENT
Start: 2025-03-26 | End: 2025-03-26 | Stop reason: HOSPADM

## 2025-03-26 RX ORDER — DEXAMETHASONE SODIUM PHOSPHATE 10 MG/ML
INJECTION, SOLUTION INTRA-ARTICULAR; INTRALESIONAL; INTRAMUSCULAR; INTRAVENOUS; SOFT TISSUE
Status: DISCONTINUED | OUTPATIENT
Start: 2025-03-26 | End: 2025-03-26 | Stop reason: HOSPADM

## 2025-03-26 NOTE — DISCHARGE INSTRUCTIONS
[facility] [place]  Dr. Glenny Mendoza  Messaging service # [phone]    RADIOFREQUENCY ABLATION POST-PROCEDURE INSTRUCTIONS:    Today you had a procedure called a radiofrequency ablation.  This is a procedure to ablate (or burn) the nerve that send pain signals from the small joints in your back or neck.  The ablation procedure can take 4-6 weeks for the nerve to die off.  Do not be surprised if your normal pain returns after the procedure and then should slowly improve over the course of the next few weeks.  Typically people do not get as much relief from the ablation as they do with the block, but the ablation should last much longer  The nerves will grow back!  The procedure can be repeated (without repeating the blocks) as long as you get 50% or more pain relief for at least 6 months.  So try to pay attention to when/if the pain returns.  The older we are, the slower the nerves regenerate.  I have seen patients get up to 5 years of pain improvement from this procedure, but more common in around 1 year.  It is important to combine this procedure with an increase in appropriate physical activity.  Strengthening the surrounding muscles will help this therapy last longer and provide better pain relief.  If you need a referral to physical therapy please let the office know.  Try to use this as an opportunity to decrease your pain medications if you are on any.  If you do not have a follow up appointment scheduled, please contact our office to get a post-procedure follow up scheduled 4-6 weeks after the procedure.  This can be done as a virtual visit if that is more convenient for you.    The procedure you had also included a steroid medication.  The steroid was mixed with a local anesthetic when it was injected to help prevent post-ablation pain.   If the procedure was in the neck, you may feel some pressure, numbness, or slight weakness in the arm after the procedure for a short period of time  (this is a normal response), if this persists for longer than 1 day please contact our office or go to the emergency room.  If the procedure was in the low back, you may feel some pressure, numbness, or slight weakness in the leg after the procedure for a short period of time (this is a normal response), if this persists for longer than 1 day please contact our office or go to the emergency room.  You may get side effects from the steroid.  This is not uncommon.  Symptoms include: elevated blood sugar, elevated blood pressure, headache, flushing, nausea, insomnia.  These symptoms are transient and will resolve within 1-3 days.  If symptoms last longer than this please contact our office or head to the emergency room.  Steroid medications can take anywhere from 3-14 days to take effect (rarely longer).  You may notice that your pain worsens for a short period of time after the injection, this would not be unusual due to the pressure and trauma from the needle.    *If you had the ablation in your neck it is not uncommon to get increased sensitivity and pain for 4-6 weeks after the procedure.  This is called neuritis and it will improve, but can take a month or two for it to fully resolve*    What you need to do:    Keep a record of your response to the injection you had today.    How much relief did you get?   When did the relief start and how long did it last?  Were you able to decrease the use of any of your pain medications?  Were you able to increase your level of activity?  How long did the relief last?    What to watch out for:    If you experience any of the following symptoms after your procedure, please notify the messaging service immediately (see above for contact information):   fever (increased oral temperature)   bleeding or swelling at the injection site,    drainage, rash or redness at the injection site    possible signs of infection    increased pain at the injection site   worsening of your usual  pain   severe headache   new or worsening numbness    new arm and/or leg weakness, or    changes in bowel and/or bladder function: urinating or defecating on yourself and not knowing that you did it.    PLEASE FOLLOW ALL INSTRUCTIONS CAREFULLY     Do not engage in strenuous activity (e.g., lifting or pushing heavy objects or repeated bending) for 24-48 hours.     Do not take a bath, swim or use Jacuzzi for 24 hours after procedure. (A shower is fine).   Remove any Band-Aids when you get home.    Use cold/ice, as needed for comfort.  We recommend the use of cold therapy alternating on for 20 minutes, off for 20 minutes.    Do not apply direct heat (heating pad or heat packs) to the injection site for 24 hours.     Resume your usual medications, unless instructed otherwise by your Pain Physician.     If you are on warfarin (Coumadin) or other blood thinner, resume this medication as instructed by your prescribing Physician.    IF AT ANY POINT YOU ARE VERY CONCERNED ABOUT YOUR SYMPTOMS, PLEASE GO TO THE EMERGENCY ROOM.    If you develop worsening pain, weakness, numbness, lose bowel or bladder control (i.e., having an accident where you did not even know you had to go to the bathroom and suddenly noticed you soiled yourself), saddle anesthesia (a loss of sensation restricted to the area of the buttocks, anus and between the legs -- i.e., those parts of your body that would touch a saddle if you were sitting on one) you need to go immediately to the emergency department for evaluation and treatment.    ----------------------------------------------------------------------------------------------------------------------------------------------------------------  If you received Sedation please read the following instructions:  POST SEDATION INSTRUCTIONS    Today you received intravenous medication (also known as sedation) that was used to help you relax and/or decrease discomfort during your procedure. This medication  will be acting in your body for the next 24 hours, so you might feel a little tired or sleepy. This feeling will slowly wear off.   Common side effects associated with these medications include: drowsiness, dizziness, sleepiness, confusion, feeling excited, difficulty remembering things, lack of steadiness with walking or balance, loss of fine muscle control, slowed reflexes, difficulty focusing, and blurred vision.  Some over-the-counter and prescription medications (e.g., muscle relaxants, opioids, mood-altering medications, sedatives/hypnotics, antihistamines) can interact with the intravenous medication you received and cause an increased risk of the side effects listed above in addition to other potentially life threatening side effects. Use extreme caution if you are taking such medications, and consult with your Pain Physician or prescribing physician if you have any questions.  For the next 12-24 hours:    DO NOT--Drive a car, operate machinery or power tools   DO NOT--Drink any alcoholic beverages (not even beer), they may dangerously increase the risk of side effects.    DO NOT--Make any important legal or business decisions or sign important documents.  We advise you to have someone to assist you at home. Move slowly and carefully. Do not make sudden changes in position. Be aware of dizziness or light-headedness and move accordingly.   If you seek medical treatment within 24 hours, let the nurse or doctor caring for you know that you have received the above medications. If you have any questions or concerns related to your sedation or treatment today please contact us.

## 2025-03-26 NOTE — DISCHARGE SUMMARY
Discharge Note  Short Stay      SUMMARY     Admit Date: 3/26/2025    Attending Physician: Glenny Mendoza      Discharge Physician: Glenny Mendoza      Discharge Date: 3/26/2025 8:59 AM    Procedure(s) (LRB):  B/l L3, L4 L5 RFA (Bilateral)    Final Diagnosis: Lumbar spondylosis [M47.816]    Disposition: Home or self care    Patient Instructions:   Current Discharge Medication List        CONTINUE these medications which have NOT CHANGED    Details   amLODIPine (NORVASC) 5 MG tablet TAKE 1 TABLET(5 MG) BY MOUTH EVERY DAY  Qty: 90 tablet, Refills: 3    Comments: .  Associated Diagnoses: Essential hypertension      atorvastatin (LIPITOR) 40 MG tablet TAKE 1 TABLET(40 MG) BY MOUTH EVERY DAY  Qty: 90 tablet, Refills: 0    Associated Diagnoses: Hyperlipidemia, unspecified hyperlipidemia type      levothyroxine (SYNTHROID) 112 MCG tablet Take 1 tablet (112 mcg total) by mouth once daily.  Qty: 90 tablet, Refills: 3    Associated Diagnoses: Hypothyroidism, unspecified type      metFORMIN (GLUCOPHAGE) 500 MG tablet TAKE 1 TABLET(500 MG) BY MOUTH DAILY WITH BREAKFAST  Qty: 90 tablet, Refills: 0      pregabalin (LYRICA) 75 MG capsule Take 1 capsule (75 mg total) by mouth 3 (three) times daily. TAKE 1 CAPSULE(75 MG) BY MOUTH THREE TIMES DAILY  Qty: 90 capsule, Refills: 11    Associated Diagnoses: Lumbar radiculopathy; Other chronic postprocedural pain      valACYclovir (VALTREX) 500 MG tablet TAKE 1 TABLET(500 MG) BY MOUTH EVERY DAY  Qty: 30 tablet, Refills: 5    Associated Diagnoses: HSV (herpes simplex virus) infection      meloxicam (MOBIC) 15 MG tablet Take 1 tablet (15 mg total) by mouth once daily.  Qty: 14 tablet, Refills: 0    Associated Diagnoses: Plantar fasciitis of left foot      multivitamin capsule Take 1 capsule by mouth once daily.      ondansetron (ZOFRAN-ODT) 4 MG TbDL Take 2 tablets (8 mg total) by mouth every 8 (eight) hours as needed (as need for nausea).  Qty: 21 tablet, Refills: 0    Associated  Diagnoses: Nausea      tirzepatide (MOUNJARO) 2.5 mg/0.5 mL PnIj Inject 2.5 mg into the skin every 7 days.  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  Activity: as per instruction sheet.  Discharge to: Home with a responsible adult.  Follow up: 2-4 weeks       Please call my office or pager at [phone] if experienced any weakness or loss of sensation, fever > 101.5, pain uncontrolled with oral medications, persistent nausea/vomiting/or diarrhea, redness or drainage from the incisions, or any other worrisome concerns. If physician on call was not reached or could not communicate with our office for any reason please go to the nearest emergency department

## 2025-03-26 NOTE — OP NOTE
Therapeutic Lumbar Medial Branch Radiofrequency Ablation under Fluoroscopy     The procedure, risks, benefits, and options were discussed with the patient. There are no contraindications to the procedure. The patent expressed understanding and agreed to the procedure. Informed written consent was obtained prior to the start of the procedure and can be found in the patient's chart.        PATIENT NAME: Ginger Marshall   MRN: [id number]     DATE OF PROCEDURE: 03/26/2025     PROCEDURE:  Bilateral L3, L4, and L5 Lumbar Radiofrequency Ablation under Fluoroscopy    PRE-OP DIAGNOSIS: Lumbar spondylosis [M47.816] Lumbar spondylosis [M47.816]    POST-OP DIAGNOSIS: Same    PHYSICIAN: Glenny Mendoza DO    ASSISTANTS: Carl Quintero MD  [facility]     MEDICATIONS INJECTED:  Preservative-free Decadron 10mg with 9cc of Bupivicaine 0.25%    LOCAL ANESTHETIC INJECTED:   Xylocaine 2%    SEDATION: Versed 2mg and Fentanyl 100mcg                                                                                                                                                                                     Conscious sedation ordered by M.D. Patient re-evaluation prior to administration of conscious sedation. No changes noted in patient's status from initial evaluation. The patient's vital signs were monitored by RN and patient remained hemodynamically stable throughout the procedure.    Event Time In   Sedation Start 0834   Sedation End 0858       ESTIMATED BLOOD LOSS:  None    COMPLICATIONS:  None     INTERVAL HISTORY: Patient has clinical and imaging findings suggestive of facet mediated pain. Patients has completed 2 previous diagnostic medial branch blocks at specified levels with at least 80% relief for the expected duration of the local anesthetic utilized.    TECHNIQUE: Time-out was performed to identify the patient and procedure to be performed. With the patient laying in a prone position, the surgical area was prepped  and draped in the usual sterile fashion using ChloraPrep and fenestrated drape. The levels were determined under fluoroscopic guidance. Skin anesthesia was achieved by injecting Lidocaine 2% over the injection sites. A 20 gauge 10mm curved active tip needle was introduced to the anatomic local of the medial branch at each of the above levels using AP, lateral and/or contralateral oblique fluoroscopic imaging. Then sensory and motor testing was performed to confirm that the needle tips were in the correct location. After negative aspiration for blood or CSF was confirmed, 1 mL of the lidocaine 2% listed above was injected slowly at each site. This was followed by thermal lesioning at 80 degrees celsius for 90 seconds. That was followed by slowly injecting 1.5 mL of the medication mixture listed above at each site. The needles were removed and bleeding was nil. A sterile dressing was applied. No specimens collected. The patient tolerated the procedure well and did not have any procedure related motor deficit at the conclusion of the procedure.    The patient was monitored after the procedure in the recovery area. They were given post-procedure and discharge instructions to follow at home. The patient was discharged in a stable condition.    Glenny Mendoza DO     I reviewed and edited the fellow's note. I conducted my own interview and physical examination. I agree with the findings. I was present and supervising all critical portions of the procedure.

## 2025-03-26 NOTE — H&P
HPI  Patient presenting for Procedure(s) (LRB):  B/l L3, L4 L5 RFA (Bilateral)     Patient on Anti-coagulation Yes    No health changes since previous encounter    Past Medical History:   Diagnosis Date    Basal cell carcinoma 2020    forehead    NORBERT (obstructive sleep apnea)     Osteopenia      Past Surgical History:   Procedure Laterality Date    ADENOIDECTOMY  ?    Had tonsils removed, not sure about adenoids.     SECTION      COLONOSCOPY N/A 2022    Procedure: COLONOSCOPY;  Surgeon: Abelardo Dean MD;  Location: [facility] ENDO ([address] [place] [address]);  Service: Endoscopy;  Laterality: N/A;  fully vaccinated, prep inst portal -ml  -covid marii-tb    EPIDURAL STEROID INJECTION INTO LUMBAR SPINE Left 3/13/2024    Procedure: LT L3-4 and L4-5 TFESI;  Surgeon: Glenny Mendoza DO;  Location: [facility];  Service: Pain Management;  Laterality: Left;  20 mins    EYE SURGERY  LASIK/ RETINAL TEAR     Cataract surgery    INJECTION OF ANESTHETIC AGENT AROUND MEDIAL BRANCH NERVES INNERVATING LUMBAR FACET JOINT Bilateral 2025    Procedure: L3, L4, L5 b/l MBB #2;  Surgeon: Glenny Mendoza DO;  Location: [facility];  Service: Pain Management;  Laterality: Bilateral;  oral sed    INJECTION, SACROILIAC JOINT Left 10/13/2022    Procedure: Left SI Joint Injection and Left GTB Injection;  Surgeon: Beny Villalobos Jr., MD;  Location: [facility] PAIN [facility]GT;  Service: Pain Management;  Laterality: Left;    INJECTION, SACROILIAC JOINT Left 2023    Procedure: INJECTION,SACROILIAC JOINT left;  Surgeon: Karson Farris MD;  Location: [facility]IN [facility]GT;  Service: Pain Management;  Laterality: Left;    LASIK      LUMBAR PARAVERTEBRAL FACET JOINT NERVE BLOCK Bilateral 2025    Procedure: L3,4,5 Bilaterally MBB #1;  Surgeon: Glenny Mendoza DO;  Location: [facility];  Service: Pain Management;  Laterality: Bilateral;  oral sed no AC    TONSILLECTOMY      TRANSFORAMINAL EPIDURAL  INJECTION OF STEROID Left 07/26/2023    Procedure: INJECTION, STEROID, EPIDURAL, TRANSFORAMINAL APPROACH;  Surgeon: Glenny Mendoza DO;  Location: [facility];  Service: Pain Management;  Laterality: Left;     Review of patient's allergies indicates:  No Known Allergies   Current Facility-Administered Medications   Medication    0.9% NaCl infusion       PMHx, PSHx, Allergies, Medications reviewed in epic    ROS negative except pain complaints in HPI    OBJECTIVE:    LMP 01/01/2000     PHYSICAL EXAMINATION:    GENERAL: Well appearing, in no acute distress, alert and oriented x3.  PSYCH:  Mood and affect appropriate.  SKIN: Skin color, texture, turgor normal, no rashes or lesions which will impact the procedure.  CV: RRR with palpation of the radial artery.  PULM: No evidence of respiratory difficulty, symmetric chest rise. Clear to auscultation.  NEURO: Cranial nerves grossly intact.    Plan:    Proceed with procedure as planned Procedure(s) (LRB):  B/l L3, L4 L5 RFA (Bilateral)    Glenny Mendoza  03/26/2025

## 2025-04-01 ENCOUNTER — PATIENT MESSAGE (OUTPATIENT)
Dept: PRIMARY CARE CLINIC | Facility: CLINIC | Age: 70
End: 2025-04-01
Payer: MEDICARE

## 2025-04-01 DIAGNOSIS — G47.33 OSA (OBSTRUCTIVE SLEEP APNEA): ICD-10-CM

## 2025-04-01 DIAGNOSIS — E66.2 OBESITY WITH ALVEOLAR HYPOVENTILATION AND BODY MASS INDEX (BMI) OF 30 TO LESS THAN 35: Primary | ICD-10-CM

## 2025-04-01 DIAGNOSIS — E11.9 TYPE 2 DIABETES MELLITUS WITHOUT COMPLICATION, WITHOUT LONG-TERM CURRENT USE OF INSULIN: ICD-10-CM

## 2025-04-02 ENCOUNTER — TELEPHONE (OUTPATIENT)
Dept: PRIMARY CARE CLINIC | Facility: CLINIC | Age: 70
End: 2025-04-02
Payer: MEDICARE

## 2025-04-04 NOTE — TELEPHONE ENCOUNTER
Attempted to call pt to schedule her canceled appt. Pt declined appt. Stated she will call if needs to see pcp.

## 2025-04-07 DIAGNOSIS — E66.2 OBESITY WITH ALVEOLAR HYPOVENTILATION AND BODY MASS INDEX (BMI) OF 30 TO LESS THAN 35: ICD-10-CM

## 2025-04-07 DIAGNOSIS — G47.33 OSA (OBSTRUCTIVE SLEEP APNEA): ICD-10-CM

## 2025-04-07 DIAGNOSIS — E11.9 TYPE 2 DIABETES MELLITUS WITHOUT COMPLICATION, WITHOUT LONG-TERM CURRENT USE OF INSULIN: ICD-10-CM

## 2025-04-07 RX ORDER — TIRZEPATIDE 5 MG/.5ML
5 INJECTION, SOLUTION SUBCUTANEOUS
Qty: 2 ML | Refills: 1 | OUTPATIENT
Start: 2025-04-07

## 2025-04-11 DIAGNOSIS — E78.5 HYPERLIPIDEMIA, UNSPECIFIED HYPERLIPIDEMIA TYPE: ICD-10-CM

## 2025-04-11 RX ORDER — ATORVASTATIN CALCIUM 40 MG/1
40 TABLET, FILM COATED ORAL
Qty: 90 TABLET | Refills: 0 | Status: SHIPPED | OUTPATIENT
Start: 2025-04-11

## 2025-04-21 RX ORDER — METFORMIN HYDROCHLORIDE 500 MG/1
500 TABLET ORAL
Qty: 90 TABLET | Refills: 0 | Status: SHIPPED | OUTPATIENT
Start: 2025-04-21

## 2025-05-17 DIAGNOSIS — B00.9 HSV (HERPES SIMPLEX VIRUS) INFECTION: ICD-10-CM

## 2025-05-19 RX ORDER — VALACYCLOVIR HYDROCHLORIDE 500 MG/1
500 TABLET, FILM COATED ORAL
Qty: 30 TABLET | Refills: 5 | Status: SHIPPED | OUTPATIENT
Start: 2025-05-19

## 2025-06-08 DIAGNOSIS — E66.2 OBESITY WITH ALVEOLAR HYPOVENTILATION AND BODY MASS INDEX (BMI) OF 30 TO LESS THAN 35: ICD-10-CM

## 2025-06-08 DIAGNOSIS — E11.9 TYPE 2 DIABETES MELLITUS WITHOUT COMPLICATION, WITHOUT LONG-TERM CURRENT USE OF INSULIN: ICD-10-CM

## 2025-06-08 DIAGNOSIS — G47.33 OSA (OBSTRUCTIVE SLEEP APNEA): ICD-10-CM

## 2025-06-09 RX ORDER — TIRZEPATIDE 7.5 MG/.5ML
INJECTION, SOLUTION SUBCUTANEOUS
Qty: 2 ML | Refills: 1 | Status: SHIPPED | OUTPATIENT
Start: 2025-06-09

## 2025-07-06 DIAGNOSIS — E78.5 HYPERLIPIDEMIA, UNSPECIFIED HYPERLIPIDEMIA TYPE: ICD-10-CM

## 2025-07-07 RX ORDER — ATORVASTATIN CALCIUM 40 MG/1
40 TABLET, FILM COATED ORAL
Qty: 90 TABLET | Refills: 0 | Status: SHIPPED | OUTPATIENT
Start: 2025-07-07

## 2025-07-08 ENCOUNTER — DOCUMENTATION ONLY (OUTPATIENT)
Dept: PRIMARY CARE CLINIC | Facility: CLINIC | Age: 70
End: 2025-07-08
Payer: MEDICARE

## 2025-07-10 ENCOUNTER — TELEPHONE (OUTPATIENT)
Dept: PRIMARY CARE CLINIC | Facility: CLINIC | Age: 70
End: 2025-07-10
Payer: MEDICARE

## 2025-07-10 DIAGNOSIS — R73.03 PREDIABETES: Primary | ICD-10-CM

## 2025-07-10 RX ORDER — METFORMIN HYDROCHLORIDE 500 MG/1
500 TABLET ORAL
Qty: 90 TABLET | Refills: 0 | Status: SHIPPED | OUTPATIENT
Start: 2025-07-10

## 2025-07-10 NOTE — TELEPHONE ENCOUNTER
Office note faxed per request.    Copied from CRM #[id number]. Topic: General Inquiry - Patient Advice  >> Jul 10, 2025  9:54 AM Vicky wrote:  Type: DME/HME    What equipment is needed? Cpap supplies     Are any other supplies needed with the equipment?    Who ordered the equipment?    Is any other information needed?Last office visit note     [facility], [facility]   reference #[id number]    Comments:Neville from [facility] said that he will be sending a fax requesting the supplies also . Please advise

## 2025-07-14 ENCOUNTER — HOSPITAL ENCOUNTER (OUTPATIENT)
Dept: RADIOLOGY | Facility: HOSPITAL | Age: 70
Discharge: HOME | End: 2025-07-14
Payer: MEDICARE

## 2025-07-14 DIAGNOSIS — G89.28 OTHER CHRONIC POSTPROCEDURAL PAIN: ICD-10-CM

## 2025-07-14 DIAGNOSIS — Z12.31 ENCOUNTER FOR SCREENING MAMMOGRAM FOR BREAST CANCER: ICD-10-CM

## 2025-07-14 DIAGNOSIS — M54.16 LUMBAR RADICULOPATHY: ICD-10-CM

## 2025-07-14 PROCEDURE — 77063 BREAST TOMOSYNTHESIS BI: CPT | Mod: TC

## 2025-07-16 RX ORDER — PREGABALIN 75 MG/1
CAPSULE ORAL
Qty: 90 CAPSULE | Refills: 0 | Status: SHIPPED | OUTPATIENT
Start: 2025-07-16

## 2025-07-22 ENCOUNTER — RESULTS FOLLOW-UP (OUTPATIENT)
Dept: PRIMARY CARE CLINIC | Facility: CLINIC | Age: 70
End: 2025-07-22
Payer: MEDICARE

## 2025-08-15 DIAGNOSIS — M54.16 LUMBAR RADICULOPATHY: ICD-10-CM

## 2025-08-15 DIAGNOSIS — G89.28 OTHER CHRONIC POSTPROCEDURAL PAIN: ICD-10-CM

## 2025-08-15 RX ORDER — PREGABALIN 75 MG/1
CAPSULE ORAL
Qty: 90 CAPSULE | Refills: 0 | Status: SHIPPED | OUTPATIENT
Start: 2025-08-15

## 2025-08-18 DIAGNOSIS — G89.28 OTHER CHRONIC POSTPROCEDURAL PAIN: ICD-10-CM

## 2025-08-18 DIAGNOSIS — E78.5 HYPERLIPIDEMIA, UNSPECIFIED HYPERLIPIDEMIA TYPE: ICD-10-CM

## 2025-08-18 DIAGNOSIS — M54.16 LUMBAR RADICULOPATHY: ICD-10-CM

## 2025-08-19 ENCOUNTER — PATIENT MESSAGE (OUTPATIENT)
Dept: PRIMARY CARE CLINIC | Facility: CLINIC | Age: 70
End: 2025-08-19
Payer: MEDICARE

## 2025-08-19 DIAGNOSIS — G47.33 OSA (OBSTRUCTIVE SLEEP APNEA): ICD-10-CM

## 2025-08-19 DIAGNOSIS — E66.2 OBESITY WITH ALVEOLAR HYPOVENTILATION AND BODY MASS INDEX (BMI) OF 30 TO LESS THAN 35: ICD-10-CM

## 2025-08-19 DIAGNOSIS — E11.9 TYPE 2 DIABETES MELLITUS WITHOUT COMPLICATION, WITHOUT LONG-TERM CURRENT USE OF INSULIN: ICD-10-CM

## 2025-08-19 RX ORDER — ATORVASTATIN CALCIUM 40 MG/1
40 TABLET, FILM COATED ORAL DAILY
Qty: 90 TABLET | Refills: 0 | Status: SHIPPED | OUTPATIENT
Start: 2025-08-19

## 2025-08-19 RX ORDER — TIRZEPATIDE 7.5 MG/.5ML
INJECTION, SOLUTION SUBCUTANEOUS
Qty: 2 ML | Refills: 1 | Status: SHIPPED | OUTPATIENT
Start: 2025-08-19

## 2025-08-19 RX ORDER — PREGABALIN 75 MG/1
75 CAPSULE ORAL 3 TIMES DAILY
Qty: 90 CAPSULE | Refills: 0 | Status: SHIPPED | OUTPATIENT
Start: 2025-08-19

## 2025-08-20 ENCOUNTER — TELEPHONE (OUTPATIENT)
Dept: PRIMARY CARE CLINIC | Facility: CLINIC | Age: 70
End: 2025-08-20
Payer: MEDICARE

## 2025-08-20 DIAGNOSIS — R11.0 NAUSEA: ICD-10-CM

## 2025-08-20 DIAGNOSIS — R19.7 DIARRHEA, UNSPECIFIED TYPE: Primary | ICD-10-CM

## 2025-08-20 RX ORDER — LOPERAMIDE HYDROCHLORIDE 2 MG/1
2 CAPSULE ORAL 4 TIMES DAILY PRN
Qty: 20 CAPSULE | Refills: 0 | Status: SHIPPED | OUTPATIENT
Start: 2025-08-20 | End: 2025-08-30

## 2025-08-20 RX ORDER — ONDANSETRON 4 MG/1
8 TABLET, ORALLY DISINTEGRATING ORAL EVERY 8 HOURS PRN
Qty: 21 TABLET | Refills: 0 | Status: SHIPPED | OUTPATIENT
Start: 2025-08-20

## (undated) DEVICE — DRESSING LEUKOPLAST FLEX 1X3IN

## (undated) DEVICE — BANDAGE ADHESIVE